# Patient Record
Sex: MALE | Race: WHITE | Employment: FULL TIME | URBAN - NONMETROPOLITAN AREA
[De-identification: names, ages, dates, MRNs, and addresses within clinical notes are randomized per-mention and may not be internally consistent; named-entity substitution may affect disease eponyms.]

---

## 2019-08-05 ENCOUNTER — OFFICE VISIT (OUTPATIENT)
Dept: FAMILY MEDICINE CLINIC | Age: 64
End: 2019-08-05

## 2019-08-05 VITALS
OXYGEN SATURATION: 99 % | SYSTOLIC BLOOD PRESSURE: 124 MMHG | HEART RATE: 68 BPM | TEMPERATURE: 97.7 F | DIASTOLIC BLOOD PRESSURE: 76 MMHG

## 2019-08-05 DIAGNOSIS — M54.32 LEFT SIDED SCIATICA: Primary | ICD-10-CM

## 2019-08-05 PROCEDURE — 20552 NJX 1/MLT TRIGGER POINT 1/2: CPT | Performed by: INTERNAL MEDICINE

## 2019-08-05 PROCEDURE — 99213 OFFICE O/P EST LOW 20 MIN: CPT | Performed by: INTERNAL MEDICINE

## 2019-08-05 RX ORDER — METHYLPREDNISOLONE 4 MG/1
TABLET ORAL
Qty: 1 KIT | Refills: 0 | Status: SHIPPED | OUTPATIENT
Start: 2019-08-05 | End: 2019-08-11

## 2019-08-05 RX ORDER — BUPIVACAINE HYDROCHLORIDE 5 MG/ML
0.5 INJECTION, SOLUTION PERINEURAL ONCE
Status: COMPLETED | OUTPATIENT
Start: 2019-08-05 | End: 2019-08-05

## 2019-08-05 RX ORDER — METHYLPREDNISOLONE ACETATE 40 MG/ML
20 INJECTION, SUSPENSION INTRA-ARTICULAR; INTRALESIONAL; INTRAMUSCULAR; SOFT TISSUE ONCE
Status: COMPLETED | OUTPATIENT
Start: 2019-08-05 | End: 2019-08-05

## 2019-08-05 RX ADMIN — BUPIVACAINE HYDROCHLORIDE 2.5 MG: 5 INJECTION, SOLUTION PERINEURAL at 14:12

## 2019-08-05 RX ADMIN — METHYLPREDNISOLONE ACETATE 20 MG: 40 INJECTION, SUSPENSION INTRA-ARTICULAR; INTRALESIONAL; INTRAMUSCULAR; SOFT TISSUE at 14:15

## 2019-08-05 ASSESSMENT — PATIENT HEALTH QUESTIONNAIRE - PHQ9
SUM OF ALL RESPONSES TO PHQ9 QUESTIONS 1 & 2: 0
1. LITTLE INTEREST OR PLEASURE IN DOING THINGS: 0
SUM OF ALL RESPONSES TO PHQ QUESTIONS 1-9: 0
2. FEELING DOWN, DEPRESSED OR HOPELESS: 0
SUM OF ALL RESPONSES TO PHQ QUESTIONS 1-9: 0

## 2019-08-07 ENCOUNTER — TELEPHONE (OUTPATIENT)
Dept: FAMILY MEDICINE CLINIC | Age: 64
End: 2019-08-07

## 2023-01-20 ENCOUNTER — APPOINTMENT (OUTPATIENT)
Dept: MRI IMAGING | Age: 68
DRG: 270 | End: 2023-01-20
Attending: INTERNAL MEDICINE
Payer: MEDICARE

## 2023-01-20 ENCOUNTER — HOSPITAL ENCOUNTER (INPATIENT)
Age: 68
LOS: 6 days | Discharge: NURSING, REHAB OR LONG-TERM CARE | DRG: 270 | End: 2023-01-26
Attending: INTERNAL MEDICINE | Admitting: INTERNAL MEDICINE
Payer: MEDICARE

## 2023-01-20 ENCOUNTER — ANESTHESIA (OUTPATIENT)
Dept: INTERVENTIONAL RADIOLOGY/VASCULAR | Age: 68
End: 2023-01-20
Payer: MEDICARE

## 2023-01-20 ENCOUNTER — APPOINTMENT (OUTPATIENT)
Dept: INTERVENTIONAL RADIOLOGY/VASCULAR | Age: 68
DRG: 270 | End: 2023-01-20
Attending: INTERNAL MEDICINE
Payer: MEDICARE

## 2023-01-20 ENCOUNTER — ANESTHESIA EVENT (OUTPATIENT)
Dept: INTERVENTIONAL RADIOLOGY/VASCULAR | Age: 68
End: 2023-01-20
Payer: MEDICARE

## 2023-01-20 ENCOUNTER — APPOINTMENT (OUTPATIENT)
Dept: ULTRASOUND IMAGING | Age: 68
DRG: 270 | End: 2023-01-20
Attending: INTERNAL MEDICINE
Payer: MEDICARE

## 2023-01-20 ENCOUNTER — APPOINTMENT (OUTPATIENT)
Dept: CT IMAGING | Age: 68
DRG: 270 | End: 2023-01-20
Attending: INTERNAL MEDICINE
Payer: MEDICARE

## 2023-01-20 PROBLEM — I77.74 VERTEBRAL ARTERY DISSECTION (HCC): Status: ACTIVE | Noted: 2023-01-20

## 2023-01-20 PROBLEM — I63.9 ACUTE ISCHEMIC STROKE (HCC): Status: ACTIVE | Noted: 2023-01-20

## 2023-01-20 PROBLEM — J44.9 CHRONIC OBSTRUCTIVE PULMONARY DISEASE (HCC): Status: ACTIVE | Noted: 2023-01-20

## 2023-01-20 LAB
ADENOVIRUS BY PCR: NOT DETECTED
ALBUMIN SERPL-MCNC: 4 G/DL (ref 3.5–5.2)
ALBUMIN SERPL-MCNC: 4 G/DL (ref 3.5–5.2)
ALP BLD-CCNC: 104 U/L (ref 40–129)
ALP BLD-CCNC: 109 U/L (ref 40–129)
ALT SERPL-CCNC: 12 U/L (ref 0–40)
ALT SERPL-CCNC: 12 U/L (ref 0–40)
ANION GAP SERPL CALCULATED.3IONS-SCNC: 10 MMOL/L (ref 7–16)
ANION GAP SERPL CALCULATED.3IONS-SCNC: 12 MMOL/L (ref 7–16)
APTT: 29.6 SEC (ref 24.5–35.1)
APTT: 37.2 SEC (ref 24.5–35.1)
AST SERPL-CCNC: 16 U/L (ref 0–39)
AST SERPL-CCNC: 18 U/L (ref 0–39)
BASOPHILS ABSOLUTE: 0.02 E9/L (ref 0–0.2)
BASOPHILS RELATIVE PERCENT: 0.4 % (ref 0–2)
BILIRUB SERPL-MCNC: 0.4 MG/DL (ref 0–1.2)
BILIRUB SERPL-MCNC: 0.5 MG/DL (ref 0–1.2)
BORDETELLA PARAPERTUSSIS BY PCR: NOT DETECTED
BORDETELLA PERTUSSIS BY PCR: NOT DETECTED
BUN BLDV-MCNC: 11 MG/DL (ref 6–23)
BUN BLDV-MCNC: 12 MG/DL (ref 6–23)
CALCIUM IONIZED: 1.26 MMOL/L (ref 1.15–1.33)
CALCIUM IONIZED: 1.27 MMOL/L (ref 1.15–1.33)
CALCIUM SERPL-MCNC: 8.6 MG/DL (ref 8.6–10.2)
CALCIUM SERPL-MCNC: 8.7 MG/DL (ref 8.6–10.2)
CHLAMYDOPHILIA PNEUMONIAE BY PCR: NOT DETECTED
CHLORIDE BLD-SCNC: 102 MMOL/L (ref 98–107)
CHLORIDE BLD-SCNC: 102 MMOL/L (ref 98–107)
CHOLESTEROL, TOTAL: 125 MG/DL (ref 0–199)
CO2: 20 MMOL/L (ref 22–29)
CO2: 22 MMOL/L (ref 22–29)
CORONAVIRUS 229E BY PCR: NOT DETECTED
CORONAVIRUS HKU1 BY PCR: NOT DETECTED
CORONAVIRUS NL63 BY PCR: NOT DETECTED
CORONAVIRUS OC43 BY PCR: NOT DETECTED
CREAT SERPL-MCNC: 0.6 MG/DL (ref 0.7–1.2)
CREAT SERPL-MCNC: 0.6 MG/DL (ref 0.7–1.2)
EKG ATRIAL RATE: 57 BPM
EKG P AXIS: 43 DEGREES
EKG P-R INTERVAL: 166 MS
EKG Q-T INTERVAL: 456 MS
EKG QRS DURATION: 86 MS
EKG QTC CALCULATION (BAZETT): 443 MS
EKG R AXIS: -25 DEGREES
EKG T AXIS: 73 DEGREES
EKG VENTRICULAR RATE: 57 BPM
EOSINOPHILS ABSOLUTE: 0.05 E9/L (ref 0.05–0.5)
EOSINOPHILS RELATIVE PERCENT: 1 % (ref 0–6)
GFR SERPL CREATININE-BSD FRML MDRD: >60 ML/MIN/1.73
GFR SERPL CREATININE-BSD FRML MDRD: >60 ML/MIN/1.73
GLUCOSE BLD-MCNC: 118 MG/DL (ref 74–99)
GLUCOSE BLD-MCNC: 119 MG/DL (ref 74–99)
HBA1C MFR BLD: 5.1 % (ref 4–5.6)
HCT VFR BLD CALC: 35.3 % (ref 37–54)
HCT VFR BLD CALC: 36.4 % (ref 37–54)
HDLC SERPL-MCNC: 37 MG/DL
HEMOGLOBIN: 12.2 G/DL (ref 12.5–16.5)
HEMOGLOBIN: 12.5 G/DL (ref 12.5–16.5)
HUMAN METAPNEUMOVIRUS BY PCR: NOT DETECTED
HUMAN RHINOVIRUS/ENTEROVIRUS BY PCR: NOT DETECTED
IMMATURE GRANULOCYTES #: 0.01 E9/L
IMMATURE GRANULOCYTES %: 0.2 % (ref 0–5)
INFLUENZA A BY PCR: NOT DETECTED
INFLUENZA B BY PCR: NOT DETECTED
LDL CHOLESTEROL CALCULATED: 74 MG/DL (ref 0–99)
LYMPHOCYTES ABSOLUTE: 1.57 E9/L (ref 1.5–4)
LYMPHOCYTES RELATIVE PERCENT: 30.4 % (ref 20–42)
MAGNESIUM: 1.8 MG/DL (ref 1.6–2.6)
MAGNESIUM: 2.2 MG/DL (ref 1.6–2.6)
MCH RBC QN AUTO: 30.4 PG (ref 26–35)
MCH RBC QN AUTO: 31.3 PG (ref 26–35)
MCHC RBC AUTO-ENTMCNC: 34.3 % (ref 32–34.5)
MCHC RBC AUTO-ENTMCNC: 34.6 % (ref 32–34.5)
MCV RBC AUTO: 88 FL (ref 80–99.9)
MCV RBC AUTO: 91.2 FL (ref 80–99.9)
MONOCYTES ABSOLUTE: 0.38 E9/L (ref 0.1–0.95)
MONOCYTES RELATIVE PERCENT: 7.4 % (ref 2–12)
MYCOPLASMA PNEUMONIAE BY PCR: NOT DETECTED
NEUTROPHILS ABSOLUTE: 3.13 E9/L (ref 1.8–7.3)
NEUTROPHILS RELATIVE PERCENT: 60.6 % (ref 43–80)
PARAINFLUENZA VIRUS 1 BY PCR: NOT DETECTED
PARAINFLUENZA VIRUS 2 BY PCR: NOT DETECTED
PARAINFLUENZA VIRUS 3 BY PCR: NOT DETECTED
PARAINFLUENZA VIRUS 4 BY PCR: NOT DETECTED
PDW BLD-RTO: 13.2 FL (ref 11.5–15)
PDW BLD-RTO: 13.3 FL (ref 11.5–15)
PHOSPHORUS: 2.4 MG/DL (ref 2.5–4.5)
PHOSPHORUS: 2.8 MG/DL (ref 2.5–4.5)
PLATELET # BLD: 264 E9/L (ref 130–450)
PLATELET # BLD: 267 E9/L (ref 130–450)
PMV BLD AUTO: 8.8 FL (ref 7–12)
PMV BLD AUTO: 9 FL (ref 7–12)
POTASSIUM REFLEX MAGNESIUM: 3.8 MMOL/L (ref 3.5–5)
POTASSIUM SERPL-SCNC: 3.8 MMOL/L (ref 3.5–5)
PRO-BNP: 650 PG/ML (ref 0–125)
RBC # BLD: 3.99 E12/L (ref 3.8–5.8)
RBC # BLD: 4.01 E12/L (ref 3.8–5.8)
RESPIRATORY SYNCYTIAL VIRUS BY PCR: NOT DETECTED
SARS-COV-2, PCR: NOT DETECTED
SODIUM BLD-SCNC: 134 MMOL/L (ref 132–146)
SODIUM BLD-SCNC: 134 MMOL/L (ref 132–146)
TOTAL PROTEIN: 6.5 G/DL (ref 6.4–8.3)
TOTAL PROTEIN: 6.9 G/DL (ref 6.4–8.3)
TRIGL SERPL-MCNC: 69 MG/DL (ref 0–149)
TROPONIN, HIGH SENSITIVITY: 13 NG/L (ref 0–11)
VLDLC SERPL CALC-MCNC: 14 MG/DL
WBC # BLD: 5.2 E9/L (ref 4.5–11.5)
WBC # BLD: 5.7 E9/L (ref 4.5–11.5)

## 2023-01-20 PROCEDURE — 70551 MRI BRAIN STEM W/O DYE: CPT

## 2023-01-20 PROCEDURE — 82330 ASSAY OF CALCIUM: CPT

## 2023-01-20 PROCEDURE — C9113 INJ PANTOPRAZOLE SODIUM, VIA: HCPCS

## 2023-01-20 PROCEDURE — 2500000003 HC RX 250 WO HCPCS

## 2023-01-20 PROCEDURE — 3700000000 HC ANESTHESIA ATTENDED CARE

## 2023-01-20 PROCEDURE — 2500000003 HC RX 250 WO HCPCS: Performed by: PSYCHIATRY & NEUROLOGY

## 2023-01-20 PROCEDURE — 99223 1ST HOSP IP/OBS HIGH 75: CPT | Performed by: PSYCHIATRY & NEUROLOGY

## 2023-01-20 PROCEDURE — 7100000000 HC PACU RECOVERY - FIRST 15 MIN

## 2023-01-20 PROCEDURE — 92523 SPEECH SOUND LANG COMPREHEN: CPT | Performed by: SPEECH-LANGUAGE PATHOLOGIST

## 2023-01-20 PROCEDURE — 94640 AIRWAY INHALATION TREATMENT: CPT

## 2023-01-20 PROCEDURE — 6360000002 HC RX W HCPCS

## 2023-01-20 PROCEDURE — 84484 ASSAY OF TROPONIN QUANT: CPT

## 2023-01-20 PROCEDURE — 36415 COLL VENOUS BLD VENIPUNCTURE: CPT

## 2023-01-20 PROCEDURE — 93010 ELECTROCARDIOGRAM REPORT: CPT | Performed by: INTERNAL MEDICINE

## 2023-01-20 PROCEDURE — 0202U NFCT DS 22 TRGT SARS-COV-2: CPT

## 2023-01-20 PROCEDURE — 83880 ASSAY OF NATRIURETIC PEPTIDE: CPT

## 2023-01-20 PROCEDURE — 83036 HEMOGLOBIN GLYCOSYLATED A1C: CPT

## 2023-01-20 PROCEDURE — 6360000004 HC RX CONTRAST MEDICATION: Performed by: PSYCHIATRY & NEUROLOGY

## 2023-01-20 PROCEDURE — 80061 LIPID PANEL: CPT

## 2023-01-20 PROCEDURE — 85730 THROMBOPLASTIN TIME PARTIAL: CPT

## 2023-01-20 PROCEDURE — 61635 INTRACRAN ANGIOPLSTY W/STENT: CPT

## 2023-01-20 PROCEDURE — 99292 CRITICAL CARE ADDL 30 MIN: CPT | Performed by: INTERNAL MEDICINE

## 2023-01-20 PROCEDURE — 3700000001 HC ADD 15 MINUTES (ANESTHESIA)

## 2023-01-20 PROCEDURE — 85025 COMPLETE CBC W/AUTO DIFF WBC: CPT

## 2023-01-20 PROCEDURE — X2CY3T7 EXTIRPATION OF MATTER FROM GREAT VESSEL USING COMPUTER-AIDED MECHANICAL ASPIRATION, PERCUTANEOUS APPROACH, NEW TECHNOLOGY GROUP 7: ICD-10-PCS | Performed by: PSYCHIATRY & NEUROLOGY

## 2023-01-20 PROCEDURE — 2000000000 HC ICU R&B

## 2023-01-20 PROCEDURE — 2500000003 HC RX 250 WO HCPCS: Performed by: INTERNAL MEDICINE

## 2023-01-20 PROCEDURE — 2500000003 HC RX 250 WO HCPCS: Performed by: NURSE ANESTHETIST, CERTIFIED REGISTERED

## 2023-01-20 PROCEDURE — 6370000000 HC RX 637 (ALT 250 FOR IP)

## 2023-01-20 PROCEDURE — 36224 PLACE CATH CAROTD ART: CPT

## 2023-01-20 PROCEDURE — 99291 CRITICAL CARE FIRST HOUR: CPT | Performed by: INTERNAL MEDICINE

## 2023-01-20 PROCEDURE — 87081 CULTURE SCREEN ONLY: CPT

## 2023-01-20 PROCEDURE — 6370000000 HC RX 637 (ALT 250 FOR IP): Performed by: PSYCHIATRY & NEUROLOGY

## 2023-01-20 PROCEDURE — 03HG3DZ INSERTION OF INTRALUMINAL DEVICE INTO INTRACRANIAL ARTERY, PERCUTANEOUS APPROACH: ICD-10-PCS | Performed by: PSYCHIATRY & NEUROLOGY

## 2023-01-20 PROCEDURE — 92610 EVALUATE SWALLOWING FUNCTION: CPT | Performed by: SPEECH-LANGUAGE PATHOLOGIST

## 2023-01-20 PROCEDURE — 85027 COMPLETE CBC AUTOMATED: CPT

## 2023-01-20 PROCEDURE — 37236 OPEN/PERQ PLACE STENT 1ST: CPT

## 2023-01-20 PROCEDURE — 2580000003 HC RX 258

## 2023-01-20 PROCEDURE — 61635 INTRACRAN ANGIOPLSTY W/STENT: CPT | Performed by: PSYCHIATRY & NEUROLOGY

## 2023-01-20 PROCEDURE — 36620 INSERTION CATHETER ARTERY: CPT

## 2023-01-20 PROCEDURE — 70450 CT HEAD/BRAIN W/O DYE: CPT

## 2023-01-20 PROCEDURE — 2580000003 HC RX 258: Performed by: PSYCHIATRY & NEUROLOGY

## 2023-01-20 PROCEDURE — 2580000003 HC RX 258: Performed by: NURSE ANESTHETIST, CERTIFIED REGISTERED

## 2023-01-20 PROCEDURE — 84100 ASSAY OF PHOSPHORUS: CPT

## 2023-01-20 PROCEDURE — 85347 COAGULATION TIME ACTIVATED: CPT

## 2023-01-20 PROCEDURE — 93005 ELECTROCARDIOGRAM TRACING: CPT

## 2023-01-20 PROCEDURE — 37184 PRIM ART M-THRMBC 1ST VSL: CPT

## 2023-01-20 PROCEDURE — 80053 COMPREHEN METABOLIC PANEL: CPT

## 2023-01-20 PROCEDURE — 92507 TX SP LANG VOICE COMM INDIV: CPT | Performed by: SPEECH-LANGUAGE PATHOLOGIST

## 2023-01-20 PROCEDURE — C1894 INTRO/SHEATH, NON-LASER: HCPCS

## 2023-01-20 PROCEDURE — 70450 CT HEAD/BRAIN W/O DYE: CPT | Performed by: RADIOLOGY

## 2023-01-20 PROCEDURE — 03QQ3ZZ REPAIR LEFT VERTEBRAL ARTERY, PERCUTANEOUS APPROACH: ICD-10-PCS | Performed by: PSYCHIATRY & NEUROLOGY

## 2023-01-20 PROCEDURE — 83735 ASSAY OF MAGNESIUM: CPT

## 2023-01-20 PROCEDURE — 92526 ORAL FUNCTION THERAPY: CPT | Performed by: SPEECH-LANGUAGE PATHOLOGIST

## 2023-01-20 PROCEDURE — 76377 3D RENDER W/INTRP POSTPROCES: CPT

## 2023-01-20 PROCEDURE — 7100000001 HC PACU RECOVERY - ADDTL 15 MIN

## 2023-01-20 PROCEDURE — 6360000002 HC RX W HCPCS: Performed by: NURSE ANESTHETIST, CERTIFIED REGISTERED

## 2023-01-20 RX ORDER — SODIUM CHLORIDE 0.9 % (FLUSH) 0.9 %
5-40 SYRINGE (ML) INJECTION PRN
Status: DISCONTINUED | OUTPATIENT
Start: 2023-01-20 | End: 2023-01-24

## 2023-01-20 RX ORDER — LIDOCAINE HYDROCHLORIDE 20 MG/ML
INJECTION, SOLUTION EPIDURAL; INFILTRATION; INTRACAUDAL; PERINEURAL PRN
Status: DISCONTINUED | OUTPATIENT
Start: 2023-01-20 | End: 2023-01-20 | Stop reason: SDUPTHER

## 2023-01-20 RX ORDER — HYDRALAZINE HYDROCHLORIDE 20 MG/ML
10 INJECTION INTRAMUSCULAR; INTRAVENOUS EVERY 6 HOURS PRN
Status: DISCONTINUED | OUTPATIENT
Start: 2023-01-20 | End: 2023-01-26 | Stop reason: HOSPADM

## 2023-01-20 RX ORDER — ATORVASTATIN CALCIUM 20 MG/1
20 TABLET, FILM COATED ORAL NIGHTLY
Status: DISCONTINUED | OUTPATIENT
Start: 2023-01-20 | End: 2023-01-20

## 2023-01-20 RX ORDER — ASPIRIN 300 MG/1
300 SUPPOSITORY RECTAL DAILY
Status: DISCONTINUED | OUTPATIENT
Start: 2023-01-20 | End: 2023-01-22

## 2023-01-20 RX ORDER — ACETAMINOPHEN 325 MG/1
650 TABLET ORAL EVERY 4 HOURS PRN
Status: DISCONTINUED | OUTPATIENT
Start: 2023-01-20 | End: 2023-01-26 | Stop reason: HOSPADM

## 2023-01-20 RX ORDER — ATROPINE SULFATE 0.1 MG/ML
INJECTION INTRAVENOUS
Status: DISPENSED
Start: 2023-01-20 | End: 2023-01-20

## 2023-01-20 RX ORDER — POLYETHYLENE GLYCOL 3350 17 G/17G
17 POWDER, FOR SOLUTION ORAL DAILY PRN
Status: DISCONTINUED | OUTPATIENT
Start: 2023-01-20 | End: 2023-01-26 | Stop reason: HOSPADM

## 2023-01-20 RX ORDER — ONDANSETRON 2 MG/ML
4 INJECTION INTRAMUSCULAR; INTRAVENOUS EVERY 6 HOURS PRN
Status: DISCONTINUED | OUTPATIENT
Start: 2023-01-20 | End: 2023-01-26 | Stop reason: HOSPADM

## 2023-01-20 RX ORDER — PROPOFOL 10 MG/ML
INJECTION, EMULSION INTRAVENOUS PRN
Status: DISCONTINUED | OUTPATIENT
Start: 2023-01-20 | End: 2023-01-20 | Stop reason: SDUPTHER

## 2023-01-20 RX ORDER — LORAZEPAM 1 MG/1
3 TABLET ORAL
Status: DISCONTINUED | OUTPATIENT
Start: 2023-01-20 | End: 2023-01-23

## 2023-01-20 RX ORDER — SODIUM CHLORIDE 9 MG/ML
INJECTION, SOLUTION INTRAVENOUS PRN
Status: DISCONTINUED | OUTPATIENT
Start: 2023-01-20 | End: 2023-01-20 | Stop reason: SDUPTHER

## 2023-01-20 RX ORDER — HEPARIN SODIUM 10000 [USP'U]/100ML
5-30 INJECTION, SOLUTION INTRAVENOUS CONTINUOUS
Status: DISCONTINUED | OUTPATIENT
Start: 2023-01-20 | End: 2023-01-20

## 2023-01-20 RX ORDER — BUDESONIDE 0.5 MG/2ML
0.5 INHALANT ORAL 2 TIMES DAILY
Status: DISCONTINUED | OUTPATIENT
Start: 2023-01-20 | End: 2023-01-26 | Stop reason: HOSPADM

## 2023-01-20 RX ORDER — ROCURONIUM BROMIDE 10 MG/ML
INJECTION, SOLUTION INTRAVENOUS PRN
Status: DISCONTINUED | OUTPATIENT
Start: 2023-01-20 | End: 2023-01-20 | Stop reason: SDUPTHER

## 2023-01-20 RX ORDER — FENTANYL CITRATE 50 UG/ML
INJECTION, SOLUTION INTRAMUSCULAR; INTRAVENOUS PRN
Status: DISCONTINUED | OUTPATIENT
Start: 2023-01-20 | End: 2023-01-20 | Stop reason: SDUPTHER

## 2023-01-20 RX ORDER — LABETALOL HYDROCHLORIDE 5 MG/ML
10 INJECTION, SOLUTION INTRAVENOUS EVERY 10 MIN PRN
Status: DISCONTINUED | OUTPATIENT
Start: 2023-01-20 | End: 2023-01-20

## 2023-01-20 RX ORDER — EPTIFIBATIDE 0.75 MG/ML
INJECTION, SOLUTION INTRAVENOUS CONTINUOUS PRN
Status: DISCONTINUED | OUTPATIENT
Start: 2023-01-20 | End: 2023-01-20 | Stop reason: SDUPTHER

## 2023-01-20 RX ORDER — LORAZEPAM 1 MG/1
4 TABLET ORAL
Status: DISCONTINUED | OUTPATIENT
Start: 2023-01-20 | End: 2023-01-23

## 2023-01-20 RX ORDER — SODIUM CHLORIDE 9 MG/ML
INJECTION, SOLUTION INTRAVENOUS CONTINUOUS PRN
Status: DISCONTINUED | OUTPATIENT
Start: 2023-01-20 | End: 2023-01-20 | Stop reason: SDUPTHER

## 2023-01-20 RX ORDER — LORAZEPAM 2 MG/ML
2 INJECTION INTRAMUSCULAR
Status: DISCONTINUED | OUTPATIENT
Start: 2023-01-20 | End: 2023-01-23

## 2023-01-20 RX ORDER — MULTIVITAMIN WITH IRON
1 TABLET ORAL DAILY
Status: DISCONTINUED | OUTPATIENT
Start: 2023-01-20 | End: 2023-01-26 | Stop reason: HOSPADM

## 2023-01-20 RX ORDER — SODIUM CHLORIDE 0.9 % (FLUSH) 0.9 %
5-40 SYRINGE (ML) INJECTION EVERY 12 HOURS SCHEDULED
Status: DISCONTINUED | OUTPATIENT
Start: 2023-01-20 | End: 2023-01-26 | Stop reason: HOSPADM

## 2023-01-20 RX ORDER — SUCCINYLCHOLINE CHLORIDE 20 MG/ML
INJECTION INTRAMUSCULAR; INTRAVENOUS PRN
Status: DISCONTINUED | OUTPATIENT
Start: 2023-01-20 | End: 2023-01-20 | Stop reason: SDUPTHER

## 2023-01-20 RX ORDER — MAGNESIUM SULFATE 1 G/100ML
1000 INJECTION INTRAVENOUS ONCE
Status: COMPLETED | OUTPATIENT
Start: 2023-01-20 | End: 2023-01-20

## 2023-01-20 RX ORDER — THIAMINE HYDROCHLORIDE 100 MG/ML
100 INJECTION, SOLUTION INTRAMUSCULAR; INTRAVENOUS DAILY
Status: DISCONTINUED | OUTPATIENT
Start: 2023-01-20 | End: 2023-01-23

## 2023-01-20 RX ORDER — SODIUM CHLORIDE 9 MG/ML
INJECTION, SOLUTION INTRAVENOUS PRN
Status: DISCONTINUED | OUTPATIENT
Start: 2023-01-20 | End: 2023-01-26 | Stop reason: HOSPADM

## 2023-01-20 RX ORDER — ATORVASTATIN CALCIUM 80 MG/1
80 TABLET, FILM COATED ORAL NIGHTLY
Status: DISCONTINUED | OUTPATIENT
Start: 2023-01-21 | End: 2023-01-26 | Stop reason: HOSPADM

## 2023-01-20 RX ORDER — ARFORMOTEROL TARTRATE 15 UG/2ML
15 SOLUTION RESPIRATORY (INHALATION) 2 TIMES DAILY
Status: DISCONTINUED | OUTPATIENT
Start: 2023-01-20 | End: 2023-01-26 | Stop reason: HOSPADM

## 2023-01-20 RX ORDER — SODIUM CHLORIDE 9 MG/ML
INJECTION, SOLUTION INTRAVENOUS CONTINUOUS
Status: DISCONTINUED | OUTPATIENT
Start: 2023-01-20 | End: 2023-01-20

## 2023-01-20 RX ORDER — HEPARIN SODIUM 1000 [USP'U]/ML
INJECTION, SOLUTION INTRAVENOUS; SUBCUTANEOUS PRN
Status: DISCONTINUED | OUTPATIENT
Start: 2023-01-20 | End: 2023-01-20 | Stop reason: SDUPTHER

## 2023-01-20 RX ORDER — SODIUM CHLORIDE 0.9 % (FLUSH) 0.9 %
5-40 SYRINGE (ML) INJECTION EVERY 12 HOURS SCHEDULED
Status: DISCONTINUED | OUTPATIENT
Start: 2023-01-20 | End: 2023-01-24

## 2023-01-20 RX ORDER — LORAZEPAM 2 MG/ML
3 INJECTION INTRAMUSCULAR
Status: DISCONTINUED | OUTPATIENT
Start: 2023-01-20 | End: 2023-01-23

## 2023-01-20 RX ORDER — DEXAMETHASONE SODIUM PHOSPHATE 10 MG/ML
INJECTION, EMULSION INTRAMUSCULAR; INTRAVENOUS PRN
Status: DISCONTINUED | OUTPATIENT
Start: 2023-01-20 | End: 2023-01-20 | Stop reason: SDUPTHER

## 2023-01-20 RX ORDER — ONDANSETRON 4 MG/1
4 TABLET, ORALLY DISINTEGRATING ORAL EVERY 8 HOURS PRN
Status: DISCONTINUED | OUTPATIENT
Start: 2023-01-20 | End: 2023-01-26 | Stop reason: HOSPADM

## 2023-01-20 RX ORDER — FOLIC ACID 5 MG/ML
1 INJECTION, SOLUTION INTRAMUSCULAR; INTRAVENOUS; SUBCUTANEOUS DAILY
Status: DISCONTINUED | OUTPATIENT
Start: 2023-01-20 | End: 2023-01-23

## 2023-01-20 RX ORDER — LORAZEPAM 1 MG/1
1 TABLET ORAL
Status: DISCONTINUED | OUTPATIENT
Start: 2023-01-20 | End: 2023-01-23

## 2023-01-20 RX ORDER — EPTIFIBATIDE 0.75 MG/ML
0.5 INJECTION, SOLUTION INTRAVENOUS CONTINUOUS
Status: DISPENSED | OUTPATIENT
Start: 2023-01-20 | End: 2023-01-21

## 2023-01-20 RX ORDER — NICOTINE 21 MG/24HR
1 PATCH, TRANSDERMAL 24 HOURS TRANSDERMAL DAILY
Status: DISCONTINUED | OUTPATIENT
Start: 2023-01-20 | End: 2023-01-20

## 2023-01-20 RX ORDER — LORAZEPAM 2 MG/ML
1 INJECTION INTRAMUSCULAR
Status: DISCONTINUED | OUTPATIENT
Start: 2023-01-20 | End: 2023-01-23

## 2023-01-20 RX ORDER — LORAZEPAM 2 MG/ML
4 INJECTION INTRAMUSCULAR
Status: DISCONTINUED | OUTPATIENT
Start: 2023-01-20 | End: 2023-01-23

## 2023-01-20 RX ORDER — LORAZEPAM 1 MG/1
2 TABLET ORAL
Status: DISCONTINUED | OUTPATIENT
Start: 2023-01-20 | End: 2023-01-23

## 2023-01-20 RX ORDER — SODIUM CHLORIDE 0.9 % (FLUSH) 0.9 %
5-40 SYRINGE (ML) INJECTION PRN
Status: DISCONTINUED | OUTPATIENT
Start: 2023-01-20 | End: 2023-01-26 | Stop reason: HOSPADM

## 2023-01-20 RX ORDER — ONDANSETRON 2 MG/ML
INJECTION INTRAMUSCULAR; INTRAVENOUS PRN
Status: DISCONTINUED | OUTPATIENT
Start: 2023-01-20 | End: 2023-01-20 | Stop reason: SDUPTHER

## 2023-01-20 RX ADMIN — THIAMINE HYDROCHLORIDE 100 MG: 100 INJECTION, SOLUTION INTRAMUSCULAR; INTRAVENOUS at 08:23

## 2023-01-20 RX ADMIN — FENTANYL CITRATE 100 MCG: 50 INJECTION, SOLUTION INTRAMUSCULAR; INTRAVENOUS at 17:16

## 2023-01-20 RX ADMIN — PHENYLEPHRINE HYDROCHLORIDE 100 MCG: 10 INJECTION INTRAVENOUS at 18:19

## 2023-01-20 RX ADMIN — PHENYLEPHRINE HYDROCHLORIDE 100 MCG: 10 INJECTION INTRAVENOUS at 17:54

## 2023-01-20 RX ADMIN — TICAGRELOR 180 MG: 90 TABLET ORAL at 13:18

## 2023-01-20 RX ADMIN — SODIUM CHLORIDE, PRESERVATIVE FREE 10 ML: 5 INJECTION INTRAVENOUS at 13:19

## 2023-01-20 RX ADMIN — SUCCINYLCHOLINE CHLORIDE 160 MG: 20 INJECTION, SOLUTION INTRAMUSCULAR; INTRAVENOUS at 17:16

## 2023-01-20 RX ADMIN — SODIUM CHLORIDE: 9 INJECTION, SOLUTION INTRAVENOUS at 05:02

## 2023-01-20 RX ADMIN — DEXAMETHASONE SODIUM PHOSPHATE 10 MG: 10 INJECTION, EMULSION INTRAMUSCULAR; INTRAVENOUS at 17:23

## 2023-01-20 RX ADMIN — LORAZEPAM 1 MG: 2 INJECTION INTRAMUSCULAR; INTRAVENOUS at 23:04

## 2023-01-20 RX ADMIN — SODIUM CHLORIDE, PRESERVATIVE FREE 40 MG: 5 INJECTION INTRAVENOUS at 08:23

## 2023-01-20 RX ADMIN — SUGAMMADEX 143 MG: 100 INJECTION, SOLUTION INTRAVENOUS at 19:02

## 2023-01-20 RX ADMIN — ARFORMOTEROL TARTRATE 15 MCG: 15 SOLUTION RESPIRATORY (INHALATION) at 08:38

## 2023-01-20 RX ADMIN — ONDANSETRON HYDROCHLORIDE 4 MG: 2 SOLUTION INTRAMUSCULAR; INTRAVENOUS at 17:23

## 2023-01-20 RX ADMIN — POTASSIUM PHOSPHATE, MONOBASIC AND POTASSIUM PHOSPHATE, DIBASIC 10 MMOL: 224; 236 INJECTION, SOLUTION, CONCENTRATE INTRAVENOUS at 03:57

## 2023-01-20 RX ADMIN — PHENYLEPHRINE HYDROCHLORIDE 100 MCG: 10 INJECTION INTRAVENOUS at 18:01

## 2023-01-20 RX ADMIN — Medication 10 ML: at 08:24

## 2023-01-20 RX ADMIN — PHENYLEPHRINE HYDROCHLORIDE 100 MCG: 10 INJECTION INTRAVENOUS at 18:09

## 2023-01-20 RX ADMIN — ROCURONIUM BROMIDE 10 MG: 10 INJECTION, SOLUTION INTRAVENOUS at 18:00

## 2023-01-20 RX ADMIN — PHENYLEPHRINE HYDROCHLORIDE 100 MCG: 10 INJECTION INTRAVENOUS at 18:05

## 2023-01-20 RX ADMIN — ROCURONIUM BROMIDE 50 MG: 10 INJECTION, SOLUTION INTRAVENOUS at 17:24

## 2023-01-20 RX ADMIN — EPTIFIBATIDE 0.5 MCG/KG/MIN: 0.75 INJECTION INTRAVENOUS at 18:12

## 2023-01-20 RX ADMIN — Medication 1 TABLET: at 08:23

## 2023-01-20 RX ADMIN — SODIUM CHLORIDE 5 MG/HR: 9 INJECTION, SOLUTION INTRAVENOUS at 16:35

## 2023-01-20 RX ADMIN — HYDRALAZINE HYDROCHLORIDE 10 MG: 20 INJECTION INTRAMUSCULAR; INTRAVENOUS at 23:25

## 2023-01-20 RX ADMIN — MAGNESIUM SULFATE IN DEXTROSE 1000 MG: 10 INJECTION, SOLUTION INTRAVENOUS at 02:47

## 2023-01-20 RX ADMIN — PROPOFOL 160 MG: 10 INJECTION, EMULSION INTRAVENOUS at 17:16

## 2023-01-20 RX ADMIN — FOLIC ACID 1 MG: 5 INJECTION, SOLUTION INTRAMUSCULAR; INTRAVENOUS; SUBCUTANEOUS at 13:17

## 2023-01-20 RX ADMIN — ASPIRIN 300 MG: 300 SUPPOSITORY RECTAL at 20:25

## 2023-01-20 RX ADMIN — HYDRALAZINE HYDROCHLORIDE 10 MG: 20 INJECTION INTRAMUSCULAR; INTRAVENOUS at 15:01

## 2023-01-20 RX ADMIN — Medication 100 MG: at 17:16

## 2023-01-20 RX ADMIN — BUDESONIDE 500 MCG: 0.5 SUSPENSION RESPIRATORY (INHALATION) at 20:27

## 2023-01-20 RX ADMIN — HEPARIN SODIUM 1500 UNITS: 1000 INJECTION INTRAVENOUS; SUBCUTANEOUS at 17:55

## 2023-01-20 RX ADMIN — BUDESONIDE 500 MCG: 0.5 SUSPENSION RESPIRATORY (INHALATION) at 08:38

## 2023-01-20 RX ADMIN — IOPAMIDOL 125 ML: 612 INJECTION, SOLUTION INTRAVENOUS at 17:45

## 2023-01-20 RX ADMIN — SODIUM CHLORIDE: 9 INJECTION, SOLUTION INTRAVENOUS at 17:11

## 2023-01-20 RX ADMIN — ARFORMOTEROL TARTRATE 15 MCG: 15 SOLUTION RESPIRATORY (INHALATION) at 20:27

## 2023-01-20 RX ADMIN — HEPARIN SODIUM 1000 UNITS: 1000 INJECTION INTRAVENOUS; SUBCUTANEOUS at 18:04

## 2023-01-20 RX ADMIN — HEPARIN SODIUM 12 UNITS/KG/HR: 10000 INJECTION, SOLUTION INTRAVENOUS at 02:51

## 2023-01-20 ASSESSMENT — PAIN SCALES - GENERAL
PAINLEVEL_OUTOF10: 0

## 2023-01-20 NOTE — PROGRESS NOTES
CT head shows significant cerebellar infarct.   Patient has new onset diplopia which would indicated brainstem ischemia    Given the delay in care at [facility] , and stroke being >24 hours  I would need a STAT MRI brain to further determine if it is safe to offer any sort of neurointervention

## 2023-01-20 NOTE — PROGRESS NOTES
Reveiwed MRI   Images were pushed from [facility] to us  Discussed with     At this point having artery to artery embolization despite maximal medical therapy     Endovascular therapy would be the only other option he has as a life saving procedure      agrees and I will discuss with the patient

## 2023-01-20 NOTE — CARE COORDINATION
Care Coordination: LOS 0:  Transfer 1/20/23 from outside facility with garbled speech/facial droop- L. Cerebellar Subacute infarction with concern of thrombosed L Vertebral artery dissection. Neuro consulted, CIWA, Heparin gtt, Room air, Stat MRI. Neurovasc. consult for possible vertebral artery stenting. P/ Neuro, new symptoms since arrival suggesting likely brainstem stroke.  Will follow    Kit Nurse

## 2023-01-20 NOTE — PROCEDURES
Attempted echo a couple times and pt. Was either about to be transported to another test or was not in room. Will try to get echo done at a later time if possible.

## 2023-01-20 NOTE — CONSULTS
[address] and [address] Electrophysiology  Consultation Report  PATIENT: Amarilis Wells  MEDICAL RECORD NUMBER: [id number]  DATE OF SERVICE:  1/20/2023  ATTENDING ELECTROPHYSIOLOGIST: Mahad Poewll MD  PRIMARY ELECTROPHYSIOLOGIST: Justine Bauer  REFERRING PHYSICIAN: Eleonora Devlin DO and Dianna Silverio MD  CHIEF COMPLAINT: Facial droop, garbled speech      HPI: This is a 78 y.o. male with no previous known medical history since the patient has not been under medical care recently who presents with new onset complaints of unsteadiness of gait, garbled speech related to a left cerebellar stroke. His history is obtained from review of medical records since the patient is currently unable to give me any history. As noted the patient presented with the new neurological symptoms as a transfer from [facility].  According to admitting notes the patient felt well last on 1/17/2023. He awoke the next day with difficulty in ambulation as well as garbled speech and facial droop and presented to [facility] from where he was transferred to [facility] with a left cerebellar infarct related to a thrombosed left vertebral artery. Since transfer he has been seen and intervened upon by interventional neuroradiology   Results are as follows  1. 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 Partial from doses of the V1 and V2 segments suspicious for vessel   trauma. 2. Successful thrombectomy followed by reocclusion of the left   vertebral artery both extracranial and intracranial segments. 3. Complete stent assisted reconstruction of the extracranial left   vertebral artery with distal embolic protection. 4. Left posterior cerebral artery and right posterior cerebral artery   thrombectomy as a result from artery to artery emboli   5.  Successful rescue mechanical intracranial stenting of the V4   segment to prevent reocclusion       Cardiac electrophysiology service is consulted for sinus bradycardia noted on admission. Patient Active Problem List    Diagnosis Date Noted    Cerebellar stroke ([facility] 01/20/2023     Priority: Medium    Vertebral artery dissection ([facility] 01/20/2023     Priority: Medium    Chronic obstructive pulmonary disease ([facility] 01/20/2023     Priority: Medium       History reviewed. No pertinent past medical history. History reviewed. No pertinent family history.     Social History     Tobacco Use    Smoking status: Every Day     Packs/day: 0.75     Years: 40.00     Pack years: 30.00     Types: Cigarettes     Start date: 8/5/1979    Smokeless tobacco: Never   Substance Use Topics    Alcohol use: Not on file       Current Facility-Administered Medications   Medication Dose Route Frequency Provider Last Rate Last Admin    ondansetron (ZOFRAN-ODT) disintegrating tablet 4 mg  4 mg Oral Q8H PRN FLAVIA García - CNP        Or    ondansetron (ZOFRAN) injection 4 mg  4 mg IntraVENous Q6H PRN Yuri Haddad, APRN - CNP        polyethylene glycol (GLYCOLAX) packet 17 g  17 g Oral Daily PRN Yuri Haddad, APRN - CNP        perflutren lipid microspheres (DEFINITY) injection 1.5 mL  1.5 mL IntraVENous ONCE PRN FLAVIA García - CNP        [START ON 1/21/2023] atorvastatin (LIPITOR) tablet 80 mg  80 mg Oral Nightly Yuri Haddad APRN - CNP        hydrALAZINE (APRESOLINE) injection 10 mg  10 mg IntraVENous Q6H PRN Yuri Haddad APRN - CNP   10 mg at 01/20/23 1501    sodium chloride flush 0.9 % injection 5-40 mL  5-40 mL IntraVENous 2 times per day Yuri Haddad APRN - CNP   10 mL at 01/20/23 0824    sodium chloride flush 0.9 % injection 5-40 mL  5-40 mL IntraVENous PRN Yuri Haddad, APRN - CNP   10 mL at 01/20/23 1319    0.9 % sodium chloride infusion   IntraVENous PRN Yuri Haddad, APRN - CNP        thiamine (B-1) injection 100 mg  100 mg IntraVENous Daily FLAVIA García - CNP 100 mg at 01/20/23 8779    multivitamin 1 tablet  1 tablet Oral Daily Demetri Narayananverna APRN - CNP   1 tablet at 01/20/23 5096    LORazepam (ATIVAN) tablet 1 mg  1 mg Oral Q1H PRN Demetri Larch, APRN - CNP        Or    LORazepam (ATIVAN) injection 1 mg  1 mg IntraVENous Q1H PRN Demetri Larch, APRN - CNP        Or    LORazepam (ATIVAN) tablet 2 mg  2 mg Oral Q1H PRN Demetri Larch, APRN - CNP        Or    LORazepam (ATIVAN) injection 2 mg  2 mg IntraVENous Q1H PRN Demetri Larch, APRN - CNP        Or    LORazepam (ATIVAN) tablet 3 mg  3 mg Oral Q1H PRN Demetri Larch, APRN - CNP        Or    LORazepam (ATIVAN) injection 3 mg  3 mg IntraVENous Q1H PRN Demetri Larch, APRN - CNP        Or    LORazepam (ATIVAN) tablet 4 mg  4 mg Oral Q1H PRN Demetri Larch, APRN - CNP        Or    LORazepam (ATIVAN) injection 4 mg  4 mg IntraVENous Q1H PRN Demetri Ladych, APRN - CNP        arformoterol tartrate (BROVANA) nebulizer solution 15 mcg  15 mcg Nebulization BID Demetri Brandi, APRN - CNP   15 mcg at 01/20/23 0838    budesonide (PULMICORT) nebulizer suspension 500 mcg  0.5 mg Nebulization BID Demetri Brandi, APRN - CNP   500 mcg at 01/20/23 0838    pantoprazole (PROTONIX) 40 mg in sodium chloride (PF) 0.9 % 10 mL injection  40 mg IntraVENous Daily Demetri Paz APRN - CNP   40 mg at 03/95/55 5255    folic acid injection 1 mg  1 mg IntraVENous Daily Lindsey Goodson DO   1 mg at 01/20/23 1317    niCARdipine (CARDENE) 25 mg in sodium chloride 0.9 % 250 mL infusion ([id number])  2.5-15 mg/hr IntraVENous Continuous Bessy Ardon MD   Stopped at 01/20/23 1705     Facility-Administered Medications Ordered in Other Encounters   Medication Dose Route Frequency Provider Last Rate Last Admin    0.9 % sodium chloride infusion   IntraVENous Continuous PRN FLAVIA Moraes CRNA   New Bag at 01/20/23 1711    propofol injection   IntraVENous PRN FLAVIA Moraes - CRNA   160 mg at 01/20/23 1716    lidocaine PF 2 % injection   IntraVENous PRN Cherylene Leas, APRN - CRNA   100 mg at 01/20/23 1716    succinylcholine (ANECTINE) injection   IntraVENous PRN Cherylene Leas, APRN - CRNA   160 mg at 01/20/23 1716    rocuronium (ZEMURON) injection   IntraVENous PRN Cherylene Leas, APRN - CRNA   10 mg at 01/20/23 1800    fentaNYL (SUBLIMAZE) injection   IntraVENous PRN Cherylene Leas, APRN - CRNA   100 mcg at 01/20/23 1716    dexamethasone (PF) (DECADRON) injection   IntraVENous PRN Cherylene Leas, APRN - CRNA   10 mg at 01/20/23 1723    ondansetron (ZOFRAN) injection   IntraVENous PRN Cherylene Leas, APRN - CRNA   4 mg at 01/20/23 1723    heparin (porcine) injection   IntraVENous PRN Cherylene Leas, APRN - CRNA   1,000 Units at 01/20/23 1804    phenylephrine (SHIRLEY-SYNEPHRINE) injection   IntraVENous PRN Cherylene Leas, APRN - CRNA   100 mcg at 01/20/23 1819    eptifibatide (INTEGRILIN) 0.75 mg/mL infusion   IntraVENous Continuous PRN Cherylene Leas, APRN - CRNA 2.868 mL/hr at 01/20/23 1812 0.5 mcg/kg/min at 01/20/23 1812        No Known Allergies    ROS: Cannot be obtained since the patient is somewhat drowsy and unable to respond appropriately       PHYSICAL EXAM:   Vitals:    01/20/23 1330 01/20/23 1400 01/20/23 1500 01/20/23 1512   BP: (!) 208/91  (!) 184/87 (!) 160/96   Pulse: 63 57 53 64   Resp: 19 17 18 16   Temp:       TempSrc:       SpO2: 98% 98% 99%    Weight:       Height:          Constitutional: Well-developed, no acute distress  Eyes: conjunctivae normal, no xanthelasma   Ears, Nose, Throat: oral mucosa moist, no cyanosis   CV: no JVD or leg edema,. Regular rate and rhythm. Normal S1S2 and no S3. No murmurs, rubs, or gallops.   Lungs: clear to auscultation bilaterally, normal respiratory effort without used of accessory muscles  Abdomen: soft, non-tender, nondistended  Musculoskeletal: no digital clubbing, no edema   Skin: warm, no rashes     I have personally reviewed the laboratory, cardiac diagnostic and radiographic testing as outlined below:    Data:    Recent Labs     01/20/23  0106 01/20/23 0425   WBC 5.7 5.2   HGB 12.5 12.2*   HCT 36.4* 35.3*    267     Recent Labs     01/20/23  0106 01/20/23 0425    134   K 3.8 3.8    102   CO2 20* 22   BUN 12 11   CREATININE 0.6* 0.6*   CALCIUM 8.6 8.7      Lab Results   Component Value Date/Time    MG 2.2 01/20/2023 04:25 AM     No results for input(s): TSH in the last 72 hours. No results for input(s): INR in the last 72 hours. Telemetry: Sinus rhythm    EKG: Sinus rhythm/sinus bradycardia, leftward axis     Echocardiogram: 1/21/23    Conclusions      Summary   Technically difficult examination. Ejection fraction is visually estimated at 60 to 65%. Normal right ventricular size and function. Agitated saline injected for shunt evaluation shows no evidence of shunt. Mild mitral regurgitation is present. Signature      ----------------------------------------------------------------   Electronically signed by Karen Lopez MD(Interpreting   physician) on 01/21/2023 05:45 PM    I have independently reviewed all of the ECGs and rhythm strips per above     Assessment/Plan:     1. Sinus bradycardia--related to the neurological event  Unlikely to require any EP intervention    2. Cerebellar stroke--now post acute intervention, thrombectomy and stent placement of the affected vertebral artery    3. COPD    Recommendations:    Cardiorespiratory support as well as neurological monitoring as is ongoing in the intensive care unit  Doubt need for any intervention from electrophysiology standpoint  Will see as needed    All of the above was completed along with reviewing the above stated recommendations.   And a total of >50% of that time involved face-to-face time providing counseling and or coordination of care with the other providers, reviewing records/tests, counseling/education of the patient, ordering medications/tests/procedures, coordinating care, and documenting clinical information in the EHR. Thank you for allowing me to participate in your patient's care. Please call me if there are any questions or concerns.       Noah Lezama MD  Cardiac Electrophysiology  [facility]) Physicians  [facility] [place]: [facility] Electrophysiology  1/21/23

## 2023-01-20 NOTE — PROCEDURES
1500 Pre-Op: Pt's /87 consecutively x3 measurements , admin HTN PRN 10mg Hydralazine IV.  Will continue to monitor closely

## 2023-01-20 NOTE — CONSULTS
Critical Care Admit/Consult Note     Patient Crescencio Stone   MRN -  [id number]   [place] # - [de-identified]   - 1955      Date of Admission -  2023 12:29 AM  Date of evaluation -  2023  4410/4410-A   Hospital Day - 0    ADMIT/CONSULT DETAILS     Reason for Admit/Consult   Ischemic stroke, vertebral artery dissection     Consulting Liza Cleveland MD  Primary Care Physician - Nicholas Ly MD       HPI   Mr. Xochitl Rodriguez is a 78 y.o. male who was admitted to [facility] after transferred from an outside facility after presenting with difficulty walking, garbled speech and a facial droop. The patient had difficulty walking for one week and noticed the speech and facial droop and presented to the ED. While at the outside facility, the patient was found to have a left cerebellar hemisphere subacute area of infarction. He was given aspirin and clopidogrel and had an MRA/CTA with concerns of thrombosed left vertebral artery dissection. The patient was started a low dose heparin drip and the decision was made to transfer him to [facility], with Dr. Yumi Larry, neurology and Dr. Stephenie Palm, neuro-intervention consulted and made aware of the patient's transfer. Significant past medical history of tobacco dependence and alcohol dependence     Upon arrival to MICU, the patient is alert and oriented, with clear speech, left sided facial droop noted, left tongue deviation, pupils equals 4 mm, with reports of a headache across bilateral occipital lobes. The patient is currently on room air, no in any respiratory distress, denies any chest pain. The plan of care discussed with the patient and the patient is a full code. Past Medical History   History reviewed. No pertinent past medical history. Past Surgical History     History reviewed. No pertinent surgical history.     Influenza:  Not indicated  Pneumococcal Polysaccharide:  Not indicated    Current Medications   Current Medications    [START ON 2023] atorvastatin  80 mg Oral Nightly    atropine         ondansetron **OR** ondansetron, polyethylene glycol, perflutren lipid microspheres, hydrALAZINE  IV Drips/Infusions    Home Medications  No medications prior to admission. Diet/Nutrition   Diet NPO    Allergies   Patient has no known allergies. Social History   Tobacco   reports that he has been smoking cigarettes. He started smoking about 43 years ago. He has a 30.00 pack-year smoking history. He has never used smokeless tobacco.    Alcohol     has no history on file for alcohol use. Family History   History reviewed. No pertinent family history. Sleep History   n/a    ROS   REVIEW OF SYSTEMS:  CONSTITUTIONAL:  negative except for  fatigue  HEENT:  negative except for  visual changes  RESPIRATORY:  negative for  dry cough, cough with sputum, wheezing, and chest pain  CARDIOVASCULAR:  negative for  chest pain, dyspnea, palpitations, early saiety, edema  GASTROINTESTINAL:  negative for nausea, vomiting, diarrhea, and constipation  MUSCULOSKELETAL:  negative  NEUROLOGICAL:  negative except for headaches, dizziness, speech problems, visual disturbance, and weakness    Lines and Devices    Peripheral    Mechanical Ventilation Data   VENT SETTINGS (Comprehensive)     Additional Respiratory Assessments  Heart Rate: 59  Resp: 24  SpO2: 98 %    ABG  No results found for: PH, PCO2, PO2, HCO3, O2SAT  No results found for: IFIO2, MODE, SETTIDVOL, SETPEEP    Vitals    height is 6' (1.829 m) and weight is 158 lb (71.7 kg). His blood pressure is 185/104 (abnormal) and his pulse is 59. His respiration is 24 and oxygen saturation is 98%.        Temperature Range:   No data recorded  BP Range:  Systolic (86GBS), XTL:674 , Min:185 , ZBE:021     Diastolic (48KDA), OVP:842, Min:104, Max:104    Pulse Range: Pulse  Av  Min: 57  Max: 59  Respiration Range: Resp  Av.3  Min: 20  Max: 24  Current Pulse Ox[de-identified]  SpO2: 98 %  24HR Pulse Ox Range:  SpO2  Av %  Min: 98 %  Max: 98 %  Oxygen Amount and Delivery:        I/O (24 Hours)    Patient Vitals for the past 8 hrs:   BP Pulse Resp SpO2 Height Weight   23 0058 -- 59 -- -- -- --   23 0052 -- 57 24 98 % -- --   23 0045 -- 58 20 -- 6' (1.829 m) 158 lb (71.7 kg)   23 0043 (!) 185/104 57 -- -- -- --   23 0032 (!) 185/104 59 20 98 % -- --     No intake or output data in the 24 hours ending 23 0119  No intake/output data recorded. Patient Vitals for the past 96 hrs (Last 3 readings):   Weight   23 004 158 lb (71.7 kg)     Drains/Tubes Outputs  N/A    Exam   PHYSICAL EXAM:  CONSTITUTIONAL:  Ill appearing, awake, alert, cooperative, no apparent distress  EYES:  Lids and lashes normal, pupils equal, round and reactive to light, right eye with gaze  ENT:  Normocephalic, without obvious abnormality, atraumatic, sinuses nontender on palpation, external ears without lesions, oral pharynx with moist mucus membranes  LUNGS:  No increased work of breathing, good air exchange, clear to auscultation bilaterally, no crackles or wheezing  CARDIOVASCULAR:  normal apical pulses, bradycardic with regular rhythm, and normal S1 and S2  ABDOMEN:  No scars, normal bowel sounds, soft, non-distended, non-tender, no masses palpated  MUSCULOSKELETAL:  There is no redness, warmth, or swelling of the joints. Moving all extremities   NEUROLOGIC:  Awake, alert, oriented to name, place and time.  No drift noted with arm/leg raises  SKIN:  no bruising or bleeding, normal skin color, texture, turgor, no redness, warmth, or swelling, no rashes, and no lesions    Data   Old records and images have been reviewed    Lab Results   CBC   No results found for: WBC, RBC, HGB, HCT, PLT, MCV, MCH, MCHC, RDW, NRBC, SEGSPCT, BANDSPCT, BLASTSPCT, METASPCT, LYMPHOPCT, PROMYELOPCT, MONOPCT, MYELOPCT, EOSPCT, BASOPCT, MONOSABS, LYMPHSABS, EOSABS, BASOSABS, DIFFTYPE    BMP No results found for: NA, K, CL, CO2, BUN, CREATININE, GLUCOSE, CALCIUM, IONCA    LFTS  No results found for: ALKPHOS, ALT, AST, PROT, BILITOT, BILIDIR, IBILI, LABALBU    INR  No results for input(s): PROTIME, INR in the last 72 hours. APTT  Recent Labs     01/19/23  2139   APTT 29.3       Lactic Acid  No results found for: LACTA     BNP   No results for input(s): BNP in the last 72 hours. Cultures   No results for input(s): BC in the last 72 hours. No results for input(s): Crissie Soulier in the last 72 hours. No results for input(s): LABURIN in the last 72 hours. Radiology   CXR    CT Scans  CT Head WO Contrast 1/20/23   Subacute infarction localized in the inferior left cerebellar hemisphere in   the PICA distribution. No hemorrhage. Chronic parenchymal change including atrophy and old white matter ischemia.        SYSTEMS ASSESSMENT AND PLAN    Neuro   Subacute infarction left cerebellar hemisphere  Thrombosed left vertebral artery dissection vs. Left vertebral artery thromboembolism, with vertebral artery segment occlusions ( V1, V2, V4 and the left posterior interior cerebellar artery branch)   -MRA/CTA from [facility]   -Neurology consulted, appreciate input   -Vascular surgery consulted, appreciate input  -On exam, patient with worsening headache, repeat CT with subacute infarction, no hemorrhagic conversion   -Low dose heparin gtt per neurology   -ASA and plavix when appropriate   -Bedside swallow   -US of BLE r/o DVT   -Keep SBP <180 mmHg and DBP <110 mmHg   -Obtain Echo    Hx of Alcohol Use   -CIWA   -Consult  when appropriate   -Thiamine 100 mg daily    Respiratory   No acute issues  Hx COPD   -currently on room air with no respiratory distress   -Start Bronvana and Pulmicort  -Keep O2 sat 90-92%    Cardiovascular   Bradycardia, concerns 2/2 cerebellar infarct   -Obtain stat EKG  -Consult electrophysiology, appreciate input greatly  -Keep atropine at the bedside   -Troponin 13  -Obtain Echo  -Obtain lipid panel    Gastrointestinal   No acute issues  GI prophylaxis: PPI    Renal   No acute issues   -Strict I's and O's   -Bladder scan and document as a progress note     Infectious Disease   No acute issues   -Afebrile, no leukocytosis     Hematology/Oncology   No acute issues   -CT with no hemorrhagic conversation   -Anticoagulation: heparin gtt     Endocrine   No acute issues   -Obtain hemoglobin A1C    Social/Spiritual/DNR/Other   Code: Full  Disposition: ICU  Fluids/lytes/Nutrition: NS @ 75cc/hour, replace as needed, NPO to swallow screening      Case and plan discussed with attending on call Dr. Jo Schulte. Rounding attending physician, Dr. Kamini Erickson, updated regarding the case and plan. FLAVIA Arevalo - CNP    1/20/2023, 1:19 AM       I have personally seen and examined the patient in the ICU. I discussed the case in detail with the NP, nursing and respiratory therapist as needed. I reviewed the pertinent laboratory studies, imaging studies, and records. Key elements of the encounter were performed by me (> 85 % time). Family is updated at the bedside as available. Key issues of the case were discussed among consultants. Past medical history, surgical history, family history, and social history are unchanged unless stated in the history of present illness. I have reviewed the patient's medications and allergies. Patient seen and evaluated by me this morning. He did complain of new onset double vision. He reports that he noticed this symptom when he woke up this morning and denies having the symptom last night. He denies any other new changes. Physical exam:  General: Awake, alert, oriented x3.   EENT: PERRL, EOMI  CV: Regular rate and rhythm, no murmurs rubs or gallops  Lungs: Clear to auscultation bilaterally no rhonchi rales or wheezes  Abdomen: Soft nontender nondistended  Extremities: no cyanosis or clubbing. No edema extremities  Neuro: Patient is awake alert and oriented x3. Appropriately follows all commands. Strength was 5 out of 5 and equal bilaterally on my physical exam.  Dysmetria present on left. Acute CVA left cerebellar stroke  Left vertebral artery dissection  Vertical diplopia  Etoh abuse  COPD  Bradycardia  Tobacco abuse    Case discussed with Dr. Segovia Smoker, patient with new onset of diplopia, also with new sensory deficits. STAT MRI ordered. Consult placed to Neuro Interventionalist.   Continue neuro checks  Maintain SBP <180mmHg  Heparin gtt with PTT goal 40-60  Continue atorvastatin  Continue CIWA protocol, thiamine  Continue brovana, pulmicort, duonebs  GI prophylaxis, protonix        This patient is unstable and critically ill with increased risk of clinical deterioration. There are life and organ supporting interventions that require frequent monitoring and personal assessment. There is a high possibility of sudden, clinically significant or life-threatening deterioration in this patient's condition which may require prompt therapeutic interventions. I spent 80 independent minutes of critical care time excluding procedures.       Nagi Russell,

## 2023-01-20 NOTE — PLAN OF CARE
Problem: Discharge Planning  Goal: Discharge to home or other facility with appropriate resources  Outcome: Progressing  Flowsheets (Taken 1/20/2023 0400)  Discharge to home or other facility with appropriate resources: Identify barriers to discharge with patient and caregiver     Problem: Chronic Conditions and Co-morbidities  Goal: Patient's chronic conditions and co-morbidity symptoms are monitored and maintained or improved  Outcome: Progressing  Flowsheets (Taken 1/20/2023 0400)  Care Plan - Patient's Chronic Conditions and Co-Morbidity Symptoms are Monitored and Maintained or Improved: Monitor and assess patient's chronic conditions and comorbid symptoms for stability, deterioration, or improvement     Problem: ABCDS Injury Assessment  Goal: Absence of physical injury  Outcome: Progressing     Problem: Safety - Adult  Goal: Free from fall injury  Outcome: Progressing

## 2023-01-20 NOTE — PROGRESS NOTES
Physical Therapy    PT consult to evaluate/treat received and appreciated. Pt chart reviewed and evaluation attempted. Pt is currently on hold. Awaiting neurology, MRI, and possible neuro intervention prior to mobilization. Will check back as able. Thank you.         Jayashree Macias, PT, DPT   [id number]

## 2023-01-20 NOTE — ANESTHESIA PRE PROCEDURE
Department of Anesthesiology  Preprocedure Note       Name:  Beryle Joiner   Age:  78 y.o.  :  1955                                          MRN:  [id number]         Date:  2023      Surgeon: * No surgeons listed *    Procedure: * No procedures listed *    Medications prior to admission:   Prior to Admission medications    Not on File       Current medications:    Current Facility-Administered Medications   Medication Dose Route Frequency Provider Last Rate Last Admin    ondansetron (ZOFRAN-ODT) disintegrating tablet 4 mg  4 mg Oral Q8H PRN Alex Rose, APRN - CNP        Or    ondansetron (ZOFRAN) injection 4 mg  4 mg IntraVENous Q6H PRN Alex Rose, APRN - CNP        polyethylene glycol (GLYCOLAX) packet 17 g  17 g Oral Daily PRN Alex Rose, APRN - CNP        perflutren lipid microspheres (DEFINITY) injection 1.5 mL  1.5 mL IntraVENous ONCE PRN Alex Rose, APRN - CNP        [START ON 2023] atorvastatin (LIPITOR) tablet 80 mg  80 mg Oral Nightly Alex Rose, APRN - CNP        hydrALAZINE (APRESOLINE) injection 10 mg  10 mg IntraVENous Q6H PRN Alex Rose, APRN - CNP   10 mg at 23 1501    sodium chloride flush 0.9 % injection 5-40 mL  5-40 mL IntraVENous 2 times per day Alex Rose, APRN - CNP   10 mL at 23 0824    sodium chloride flush 0.9 % injection 5-40 mL  5-40 mL IntraVENous PRN Alex Rose, APRN - CNP   10 mL at 23 1319    0.9 % sodium chloride infusion   IntraVENous PRN Alex Rose, APRN - CNP        thiamine (B-1) injection 100 mg  100 mg IntraVENous Daily Alex Rose, APRN - CNP   100 mg at 23 8844    multivitamin 1 tablet  1 tablet Oral Daily Alex Rsoe, APRN - CNP   1 tablet at 23 8847    LORazepam (ATIVAN) tablet 1 mg  1 mg Oral Q1H PRN Alex Rose, APRN - CNP        Or    LORazepam (ATIVAN) injection 1 mg  1 mg IntraVENous Q1H PRN Alex Rose, APRN - CNP        Or    LORazepam (ATIVAN) tablet 2 mg  2 mg Oral Q1H PRN Junita Banker, APRN - CNP        Or    LORazepam (ATIVAN) injection 2 mg  2 mg IntraVENous Q1H PRN Junita Banker, APRN - CNP        Or    LORazepam (ATIVAN) tablet 3 mg  3 mg Oral Q1H PRN Junita Banker, APRN - CNP        Or    LORazepam (ATIVAN) injection 3 mg  3 mg IntraVENous Q1H PRN Junita Banker, APRN - CNP        Or    LORazepam (ATIVAN) tablet 4 mg  4 mg Oral Q1H PRN Junita Banker, APRN - CNP        Or    LORazepam (ATIVAN) injection 4 mg  4 mg IntraVENous Q1H PRN Junita Banker, APRN - CNP        arformoterol tartrate (BROVANA) nebulizer solution 15 mcg  15 mcg Nebulization BID Junita Banker, APRN - CNP   15 mcg at 01/20/23 0838    budesonide (PULMICORT) nebulizer suspension 500 mcg  0.5 mg Nebulization BID [facility], APRN - CNP   500 mcg at 01/20/23 3167    pantoprazole (PROTONIX) 40 mg in sodium chloride (PF) 0.9 % 10 mL injection  40 mg IntraVENous Daily Junita Banker, APRN - CNP   40 mg at 98/24/65 7265    folic acid injection 1 mg  1 mg IntraVENous Daily Lindsey Goodson DO   1 mg at 01/20/23 1317    niCARdipine (CARDENE) 25 mg in sodium chloride 0.9 % 250 mL infusion (Yvin3Cot)  2.5-15 mg/hr IntraVENous Continuous Chris Seaman MD 25 mL/hr at 01/20/23 1635 2.5 mg/hr at 01/20/23 1635       Allergies:  No Known Allergies    Problem List:    Patient Active Problem List   Diagnosis Code    Cerebellar stroke (HCC) I63.9    Vertebral artery dissection (HCC) I77.74    Chronic obstructive pulmonary disease ([facility] [address] J44.9       Past Medical History:  History reviewed. No pertinent past medical history.     Past Surgical History:        Procedure Laterality Date    HERNIA REPAIR         Social History:    Social History     Tobacco Use    Smoking status: Every Day     Packs/day: 0.75     Years: 40.00     Pack years: 30.00     Types: Cigarettes     Start date: 8/5/1979   Delaney Kasper Smokeless tobacco: Never   Substance Use Topics    Alcohol use: Not on file                                Ready to quit: Not Answered  Counseling given: Not Answered      Vital Signs (Current):   Vitals:    01/20/23 1330 01/20/23 1400 01/20/23 1500 01/20/23 1512   BP: (!) 208/91  (!) 184/87 (!) 160/96   Pulse: 63 57 53 64   Resp: 19 17 18 16   Temp:       TempSrc:       SpO2: 98% 98% 99%    Weight:       Height:                                                  BP Readings from Last 3 Encounters:   01/20/23 (!) 160/96   08/05/19 124/76       NPO Status:                            >8hrs                                                      BMI:   Wt Readings from Last 3 Encounters:   01/20/23 158 lb (71.7 kg)     Body mass index is 21.43 kg/m². CBC:   Lab Results   Component Value Date/Time    WBC 5.2 01/20/2023 04:25 AM    RBC 4.01 01/20/2023 04:25 AM    HGB 12.2 01/20/2023 04:25 AM    HCT 35.3 01/20/2023 04:25 AM    MCV 88.0 01/20/2023 04:25 AM    RDW 13.3 01/20/2023 04:25 AM     01/20/2023 04:25 AM       CMP:   Lab Results   Component Value Date/Time     01/20/2023 04:25 AM    K 3.8 01/20/2023 04:25 AM    K 3.8 01/20/2023 01:06 AM     01/20/2023 04:25 AM    CO2 22 01/20/2023 04:25 AM    BUN 11 01/20/2023 04:25 AM    CREATININE 0.6 01/20/2023 04:25 AM    LABGLOM >60 01/20/2023 04:25 AM    GLUCOSE 119 01/20/2023 04:25 AM    PROT 6.5 01/20/2023 04:25 AM    CALCIUM 8.7 01/20/2023 04:25 AM    BILITOT 0.5 01/20/2023 04:25 AM    ALKPHOS 104 01/20/2023 04:25 AM    AST 16 01/20/2023 04:25 AM    ALT 12 01/20/2023 04:25 AM       POC Tests: No results for input(s): POCGLU, POCNA, POCK, POCCL, POCBUN, POCHEMO, POCHCT in the last 72 hours.     Coags:   Lab Results   Component Value Date/Time    APTT 37.2 01/20/2023 10:39 AM       HCG (If Applicable): No results found for: PREGTESTUR, PREGSERUM, HCG, HCGQUANT     ABGs: No results found for: PHART, PO2ART, HXP9DEC, YZI2WUI, BEART, U2HLBSGN     Type & Screen (If Applicable):  No results found for: LABABO, LABRH    Drug/Infectious Status (If Applicable):  No results found for: HIV, HEPCAB    COVID-19 Screening (If Applicable):   Lab Results   Component Value Date/Time    COVID19 Not Detected 01/20/2023 04:25 AM           Anesthesia Evaluation    Airway: Mallampati: II  TM distance: >3 FB   Neck ROM: full  Mouth opening: > = 3 FB   Dental:          Pulmonary:   (+) COPD:  decreased breath sounds                            Cardiovascular:  Exercise tolerance: good (>4 METS),           Rhythm: regular  Rate: normal                    Neuro/Psych:   (+) CVA: residual symptoms,             GI/Hepatic/Renal: Neg GI/Hepatic/Renal ROS            Endo/Other: Negative Endo/Other ROS                    Abdominal:             Vascular: Other Findings:           Anesthesia Plan      general     ASA 4       Induction: intravenous. arterial line  MIPS: Prophylactic antiemetics administered and Postoperative trial extubation. Anesthetic plan and risks discussed with patient and child/children. Plan discussed with CRNA.                     Veronika Amador,    1/20/2023

## 2023-01-20 NOTE — H&P
[facility]  History and Physical      CHIEF COMPLAINT:    No chief complaint on file. Patient of Teri Rader MD presents with:  Acute ischemic stroke [facility])    History of Present Illness:   Patient does not have a past medical history on file. Patient was admitted to [facility] after transfer from an outside facility. Patient was presenting with difficulty walking garbled speech and facial droop. Patient had difficulty walking for 1 week and noticed the speech and facial droop and presented to the ED. While at the outside facility patient was found to have a left cerebellar hemisphere subacute area of infarction. Patient was given aspirin and Plavix and had MRA/CTA with concerns of thrombosed left vertebral artery dissection. Patient was started on low-dose heparin and was transferred to [facility]. Decision made to admit patient to MICU. On evaluation, he is with altered mentation, quite confused and history of alcohol abuse starting to withdraw. He has undergone multiple stents insertion in  vertebral artery per  interventional radiology  Complex neurological case    REVIEW OF SYSTEMS:  Pertinent negatives are above in HPI. 10 point ROS otherwise negative. History reviewed. No pertinent past medical history. History reviewed. No pertinent surgical history. Medications Prior to Admission:    No medications prior to admission. Note that the patient's home medications were reviewed and the above list is accurate to the best of my knowledge at the time of the exam.    Allergies:    Patient has no known allergies. Social History:    reports that he has been smoking cigarettes. He started smoking about 43 years ago. He has a 30.00 pack-year smoking history.  He has never used smokeless tobacco.    Family History:   Unknown to patient at this time      PHYSICAL EXAM:    Vitals:  BP (!) 175/92   Pulse 65   Temp 98 °F (36.7 °C) (Temporal) Resp 16   Ht 6' (1.829 m)   Wt 158 lb (71.7 kg)   SpO2 97%   BMI 21.43 kg/m²       General appearance: NAD, conversant but completely confused and agitated on evaluation  Eyes: Sclerae anicteric, PERRLA  HEENT: AT/NC, MMM  Neck: FROM, supple, no thyromegaly  Lymph: No cervical / supraclavicular lymphadenopathy  Lungs: Clear to auscultation, WOB normal  CV: Internet bradycardia no MRGs, no lower extremity edema  Abdomen: Soft, non-tender; no masses or HSM, +BS  Extremities: FROM without synovitis. No clubbing or cyanosis of the hands. Skin: no rash, induration, lesions, or ulcers  Unable to get closer assessment of neuro and psych systems    LABS:  All labs reviewed.   Of note:  CBC with Differential:    Lab Results   Component Value Date/Time    WBC 5.2 01/20/2023 04:25 AM    RBC 4.01 01/20/2023 04:25 AM    HGB 12.2 01/20/2023 04:25 AM    HCT 35.3 01/20/2023 04:25 AM     01/20/2023 04:25 AM    MCV 88.0 01/20/2023 04:25 AM    MCH 30.4 01/20/2023 04:25 AM    MCHC 34.6 01/20/2023 04:25 AM    RDW 13.3 01/20/2023 04:25 AM    LYMPHOPCT 30.4 01/20/2023 04:25 AM    MONOPCT 7.4 01/20/2023 04:25 AM    BASOPCT 0.4 01/20/2023 04:25 AM    MONOSABS 0.38 01/20/2023 04:25 AM    LYMPHSABS 1.57 01/20/2023 04:25 AM    EOSABS 0.05 01/20/2023 04:25 AM    BASOSABS 0.02 01/20/2023 04:25 AM     CMP:    Lab Results   Component Value Date/Time     01/20/2023 04:25 AM    K 3.8 01/20/2023 04:25 AM    K 3.8 01/20/2023 01:06 AM     01/20/2023 04:25 AM    CO2 22 01/20/2023 04:25 AM    BUN 11 01/20/2023 04:25 AM    CREATININE 0.6 01/20/2023 04:25 AM    LABGLOM >60 01/20/2023 04:25 AM    GLUCOSE 119 01/20/2023 04:25 AM    PROT 6.5 01/20/2023 04:25 AM    LABALBU 4.0 01/20/2023 04:25 AM    CALCIUM 8.7 01/20/2023 04:25 AM    BILITOT 0.5 01/20/2023 04:25 AM    ALKPHOS 104 01/20/2023 04:25 AM    AST 16 01/20/2023 04:25 AM    ALT 12 01/20/2023 04:25 AM       Imaging:  CT head: Subacute infarct localized in the inferior left cerebellar hemisphere in the PICA distribution. No hemorrhage. Chronic parenchymal change including atrophy and old white matter ischemia. EKG:  I've personally reviewed the patient's EKG:  Sinus bradycardia    Telemetry:  I've personally reviewed the patient's telemetry:      ASSESSMENT/PLAN:  Principal Problem:    Acute ischemic stroke [facility])  Resolved Problems:    * No resolved hospital problems. *    81-year-old male who developed slurred speech and difficulty walking presented to an outside facility was diagnosed with subacute infarct and was transferred to [address] [place] [address] MICU with    Acute ischemic cerebellar stroke  Patient agitated and confused and really not able to provide much history  MRA/CTA from [place]-thrombus left vertebral artery dissection versus left vertebral artery thromboembolism  Low-dose heparin drip-with titration parameters  1/20/2023-underwent extensive reconstruction, thrombectomy and vertebral artery stenting-please electronic medical  Radiology's procedure notes below  ASA and Brilinta, Integrilin drip has been initiated   high intensity statin, blood pressure control  Swallow study once patient able to tolerate  Echocardiogram - pending  Ultrasound bilateral lower extremity-rule out DVT  IV hydration  Smoking cessation  Neurology and neuro interventional radiology following    Bradycardia of unknown etiology  Hold offending agents  Keep atropine at bedside  Check lipid panel  Consult EP,-await input possible pacemaker placement    Medication for other comorbidities continue as appropriate dose adjustment as necessary.   Multiple consultants on board    DVT prophylaxis- heparin drip  PT OT  Discharge planning        Code status: Full  Requires inpatient level of care    Madeline Ramos MD  8:51 AM  1/20/2023

## 2023-01-20 NOTE — LETTER
[facility] Medicaid  CERTIFICATION OF NECESSITY  FOR NON-EMERGENCY TRANSPORTATION   BY GROUND AMBULANCE      Individual Information   1. Name: Fermin Hale 2. [place] Medicaid Billing Number:    3. Address: [address] [place] [address] Road      Transportation Provider Information   4. Provider Name:    5. [place] Medicaid Provider Number:  National Provider Identifier (NPI):      Certification  7. Criteria:  During transport, this individual requires:  [x] Medical treatment or continuous     supervision by an EMT. [] The administration or regulation of oxygen by another person. [] Supervised protective restraint. 8. Period Beginning Date:    5. Length  [x] Not more than 1 day(s)  [] One Year     Additional Information Relevant to Certification   10. Comments or Explanations, If Necessary or Appropriate   ACUTE ISCHEMIC STROKE,LEFT COORDINATION DEFICITS, [place] [address] Practitioner Information   11. Name of Practitioner: Dr Nguyễn Mcdaniel MD   12. [place] Medicaid Provider Number, If Applicable:  [address] Provider Identifier (NPI):      Signature Information   14. Date of Signature:  13. Name of Person Signin. Signature and Professional Designation:      [facility] [id number]  Rev. 2015  [address] [place] [address] Encounter Date/Time: 2023 [address] [facility] Account: [de-identified]    MRN: [id number]    Patient: Fermin Hale    Contact Serial #: [id number]      ENCOUNTER          Patient Class: I Private Enc?   No [facility]   Hospital Service: MED   Encounter DX: Acute ischemic stroke (H*   ADM Provider: Nguyễn Mcdaniel MD   Procedure:     ATT Provider: Nguyễn Mcdaniel MD   REF Provider: [facility]  Admission DX: Acute ischemic stroke [facility]) and DX codes: I63.9      PATIENT                 Name: Fermin Hale : 1955 (67 yrs)   Address: [facility] [place] Sex: Male   [place]: [facility] [address]         Marital Status:    Employer: UNKNOWN         [place]: [facility]   Primary Care Provider: Brenda Garcia MD         Primary Phone: [phone]   EMERGENCY CONTACT   Contact Name Legal Guardian? Relationship to Patient Home Phone Work Phone   1. [address]  2. Lindsey Felix    No Parent  Brother/Sister [phone]              GUARANTOR  Guarantor: Cherie Sanchez     : 1955   Address: [address] [address] [place] Sex: Male   [place] [address]     Relation to Patient: Self       Home Phone: [phone]   Guarantor ID: [id number]       Work Phone:     Guarantor Employer: UNKNOWN         Status: FULL TIME      COVERAGE        PRIMARY INSURANCE   Payor: Sharene Petty MEDICARE Plan: [facility] MEDICARE ADVANTAGE*   Payor Address: [facility] [id number],  [address]       Group Number: [id number] Insurance Type: INDEMNITY   Subscriber Name: Wayne Blankenship : 1955   Subscriber ID: [id number] Pat.  Rel. to Sub: Self   SECONDARY INSURANCE   Payor:   Plan:     Payor Address:  ,           Group Number:   Insurance Type:     Subscriber Name:   Subscriber

## 2023-01-20 NOTE — PROGRESS NOTES
SPEECH/LANGUAGE PATHOLOGY  CLINICAL ASSESSMENT OF SWALLOWING FUNCTION   and PLAN OF CARE    PATIENT NAME:  Jon Junior  (male)     MRN:  [id number]    :  1955  (78 y.o.)  STATUS:  Inpatient: Room 4410/4410-A    TODAY'S DATE:  23    Speech Language Pathology (SLP) eval and treat  Start:  23,   End:  23,   ONE TIME,   Standing Count:  1 Occurrences,   R         Catherine Mullins APRN - CNP   REASON FOR REFERRAL: assess oropharyngeal swallow function   EVALUATING THERAPIST: CHAAPRRITA Porter                 RESULTS:    DYSPHAGIA DIAGNOSIS:   Clinical indicators of suspected pharyngeal phase dysphagia       DIET RECOMMENDATIONS:  NPO until MBSS can be completed        FEEDING RECOMMENDATIONS:     Assistance level:  Not applicable      Compensatory strategies recommended: Not applicable      Discussed recommendations with nursing and/or faxed report to referring provider: Yes    SPEECH THERAPY  PLAN OF CARE   The dysphagia POC is established based on physician order, dysphagia diagnosis and results of clinical assessment     Will establish POC once MBSS is completed. Conditions Requiring Skilled Therapeutic Intervention for dysphagia:    Throat clearing during PO intake   Coughing during PO intake      Specific dysphagia interventions to include:     Trials of upgraded diet/liquid     Specific instructions for next treatment:  MBSS to be completed  Patient Treatment Goals:    Short Term Goals:  Pt will participate in MBSS to fully assess oropharyngeal swallow function and to assist in determining the least restrictive PO diet to maintain adequate nutrition/hydration     Long Term Goals:   Pt will improve oropharyngeal swallow function to ensure airway protection during PO intake to maintain adequate nutrition/hydration and decrease signs/symptoms of aspiration to less than 1 x/day.    OTHER RECOMMENDATIONS:  A Video Swallow Study (MBSS) is recommended and requires a physician order      Patient/family Goal:    Did not state. Will further assess during treatment. Plan of care discussed with Patient and Family   The Patient and Family understand(s) the diagnosis, prognosis and plan of care     Rehabilitation Potential/Prognosis: pending results of MBSS                    ADMITTING DIAGNOSIS: Acute ischemic stroke ([facility] [address] [I63.9]    VISIT DIAGNOSIS:      PATIENT REPORT/COMPLAINT: patient currently NPO pending results of this evaluation  RN/ Physician cleared patient for participation in assessment     yes     PRIOR LEVEL OF SWALLOW FUNCTION:    PAST HISTORY OF DYSPHAGIA?: none reported    Home diet: Regular consistency solids (IDDSI level 7) with  thin liquids (IDDSI level 0)  Current Diet Order:  Diet NPO    PROCEDURE:  Consistencies Administered During the Evaluation   Liquids: ice chips   Solids:  pureed foods      Method of Intake:   cup, spoon  Fed by clinician      Position:   Seated, upright    CLINICAL ASSESSMENT:  Oral Stage: The oral stage of swallowing was within functional limits for consistencies administered      Pharyngeal Stage:    Throat clearing present after presentation of thin liquid  Immediate wet cough was noted after presentation of thin liquid  Latent wet cough was noted after presentation of thin liquid  Wet/gurgly vocal quality was noted after presentation of all consistencies administered  Multiple swallows were noted after presentation of all consistencies administered    Cognition:   Within functional limits for this exam    Oral Peripheral Examination   Left labiobuccal weakness    Current Respiratory Status    room air     Parameters of Speech Production  Respiration:  Adequate for speech production  Quality:   Hoarse  Intensity: Within functional limits    Volitional Swallow: present     Volitional Cough:   present     Pain: No pain reported.     EDUCATION:   The Speech Language Pathologist (SLP) completed education regarding results of evaluation and that intervention is warranted at this time. Learner: Patient and Family  Education: Reviewed results and recommendations of this evaluation, Reviewed diet and strategies, Reviewed signs, symptoms and risks of aspiration, Reviewed recommendations for follow-up, and Education Related to Potential Risks and Complications Due to Impairment/Illness/Injury  Evaluation of Education:  Verbalizes understanding    This plan may be re-evaluated and revised as warranted. Evaluation Time includes thorough review of current medical information, gathering information on past medical history/social history and prior level of function, completion of standardized testing/informal observation of tasks, assessment of data and education on plan of care and goals. [x]The admitting diagnosis and active problem list, have been reviewed prior to initiation of this evaluation. ACTIVE PROBLEM LIST:   Patient Active Problem List   Diagnosis    Acute ischemic stroke [facility])         CPT code:  00592  bedside swallow eval    INTERVENTION  CPT Code: 15723  dysphagia tx    Speech Pathologist (SLP) completed education with the patient/family regarding type of swallowing impairment. Reviewed current solid/liquid consistency diet recommendations and discussed compensatory strategies to ensure safe PO intake. Reviewed aspiration precautions. Encouraged patient and/or family to engage SLP in unstructured Q&A session relative to identified deficit areas; indicated understanding of all information provided via satisfactory verbal response.

## 2023-01-20 NOTE — PROGRESS NOTES
Occupational Therapy    Date:2023  Patient Name: David Sevilla  MRN: [id number]  : 1955  Room: [address] [place] [address]-A     OT orders received and chart reviewed. OT eval on hold at this time pending neurology POC & potential for neuro intervention per RN. OT will follow and re-attempt eval as appropriate at a later time/date.     Eulogio Mcgowan OTR/L; [id number]

## 2023-01-20 NOTE — CONSULTS
NEUROLOGY CONSULT NOTE      Requesting Physician:  FLAVIA Cleary - BERT    Reason for Consult:  Evaluate for cerebellar stroke with acute left vertebral artery dissection. History of Present Illness:  Nora Esqueda is a 78 y.o. male  with no known medical history since patient has not been managed medically for some time who was admitted to [facility] on 1/20/2023 on transfer from THE [facility] with presentation of ataxia and left cerebellar stroke. Patient states his last known well was on Tuesday-1/17/2023. He awoke the following morning and noticed that he was having difficulty with imbalance and movement. He states whenever he tried to walk he was falling to the side and could not control his balance. There was no report of any dizziness or vertigo associated. Patient nconcern for possible stroke and was therefore taking 2 centimeters off for evaluation. He was subsequently admitted to the hospital because noted problems with garbled speech and facial droop along with his ataxia. Problems with speech and facial droop present on presentation with subsequent CT scanning of the brain done showing left cerebellar infarct. Patient was placed on aspirin and clopidogrel with MRI and CTA ordered. Subsequent CTA study done showing a thrombosed left vertebral artery dissection in prompting call for neurology evaluation and transfer to this facility. Given dissection therapy discussion initiated and patient recommended for start on heparin drip at that facility prior to transfer. Since transfer patient has developed vertical diplopia. because of thePast Medical History:    History reviewed. No pertinent past medical history. History reviewed. No pertinent surgical history.     Social History:  Social History     Tobacco Use   Smoking Status Every Day    Packs/day: 0.75    Years: 40.00    Pack years: 30.00    Types: Cigarettes    Start date: 8/5/1979   Smokeless Tobacco Never Social History     Substance and Sexual Activity   Alcohol Use None     Social History     Substance and Sexual Activity   Drug Use Not on file         Family History:   History reviewed. No pertinent family history. Review of Systems:  All systems reviewed are negative except what is mentioned in history of present illness. Allergies:    No Known Allergies     Current Medications:   ondansetron (ZOFRAN-ODT) disintegrating tablet 4 mg, Q8H PRN   Or  ondansetron (ZOFRAN) injection 4 mg, Q6H PRN  polyethylene glycol (GLYCOLAX) packet 17 g, Daily PRN  perflutren lipid microspheres (DEFINITY) injection 1.5 mL, ONCE PRN  [START ON 1/21/2023] atorvastatin (LIPITOR) tablet 80 mg, Nightly  atropine 1 MG/10ML injection,   hydrALAZINE (APRESOLINE) injection 10 mg, Q6H PRN  sodium chloride flush 0.9 % injection 5-40 mL, 2 times per day  sodium chloride flush 0.9 % injection 5-40 mL, PRN  0.9 % sodium chloride infusion, PRN  thiamine (B-1) injection 100 mg, Daily  multivitamin 1 tablet, Daily  nicotine (NICODERM CQ) 21 MG/24HR 1 patch, Daily  LORazepam (ATIVAN) tablet 1 mg, Q1H PRN   Or  LORazepam (ATIVAN) injection 1 mg, Q1H PRN   Or  LORazepam (ATIVAN) tablet 2 mg, Q1H PRN   Or  LORazepam (ATIVAN) injection 2 mg, Q1H PRN   Or  LORazepam (ATIVAN) tablet 3 mg, Q1H PRN   Or  LORazepam (ATIVAN) injection 3 mg, Q1H PRN   Or  LORazepam (ATIVAN) tablet 4 mg, Q1H PRN   Or  LORazepam (ATIVAN) injection 4 mg, Q1H PRN  heparin 25,000 units in dextrose 5% 250 mL (premix) infusion, Continuous  arformoterol tartrate (BROVANA) nebulizer solution 15 mcg, BID  budesonide (PULMICORT) nebulizer suspension 500 mcg, BID  pantoprazole (PROTONIX) 40 mg in sodium chloride (PF) 0.9 % 10 mL injection, Daily  folic acid injection 1 mg, Daily         Physical Exam:  BP (!) 172/99   Pulse 57   Temp 98 °F (36.7 °C)   Resp 15   Ht 6' (1.829 m)   Wt 158 lb (71.7 kg)   SpO2 97%   BMI 21.43 kg/m²  I Body mass index is 21.43 kg/m².  I Wt Readings from Last 1 Encounters:   01/20/23 158 lb (71.7 kg)          HEENT: Normocephalic, atraumatic, no lesions or abnormalities noted. Neck:  supple with full ROM; no masses, nodes or bruits; no cervical tenderness on palpation. Lungs:  clear to auscultation  bilaterally     CV: RRR without gallops or murmurs     Extremities: no c/c/e           Neurologic Exam   Mental Status:  Patient was alert, responsive, oriented, appropriate, answering questions, and following commands. Speech was fluent with normal sensorium and cognition. Cranial Nerves: Pupils were equal round and reactive to light;    Visual fields were full on confrontation; Extraocular movements were intact; no nystagmus; Intact facial sensation to temp, pinprick, and light touch; Symmetric facial movements with good lip and eye closure bilaterally; Hearing was intact; Cardenas was midline;    Normal palatal elevation with midline uvula  Trapezius strength of 5/5. Tongue was midline with no atrophy or fasciculations  Motor Exam:  Strength was 5/5 throughout; normal tone and bulk; no atrophy or fasiculations noted. Sensory Exam: Decreased sensation to pinprick temperature left face and right hemibody with otherwise normal sensation to all modalities. Cerebella Exam: Positive dysmetria on the left side involving arm and leg with no tremor and only mild left arm rebound. Normal cerebellar function on the right side with no tremors or abnormal movements. Gait:  Gait was not tested.    Reflexes: normal and symmetric bilaterally; Babinski is negative    Labs:    CBC:   Recent Labs     01/20/23  0106 01/20/23  0425   WBC 5.7 5.2   HGB 12.5 12.2*    267   MCV 91.2 88.0   MCH 31.3 30.4   MCHC 34.3 34.6*   RDW 13.2 13.3     CMP:  Recent Labs     01/20/23  0106 01/20/23  0425    134   K 3.8 3.8    102   CO2 20* 22   BUN 12 11   CREATININE 0.6* 0.6*   LABGLOM >60 >60   GLUCOSE 118* 119*   CALCIUM 8.6 8.7 Liver:   Recent Labs     01/20/23  0425   AST 16   ALT 12   ALKPHOS 104   PROT 6.5   LABALBU 4.0   BILITOT 0.5     INR: No results for input(s): PROTIME, INR in the last 72 hours. ToxicologyNo results for input(s): PHENYTOIN, CARBTOT, PHENOBARB, VALPROATE, LAMOTRIG in the last 72 hours. Invalid input(s):  KEPPRA  No results for input(s): AMPMETHURSCR, BARBTQTU, BDZQTU, CANNABQUANT, COCMETQTU, OPIAU, PCPQUANT in the last 72 hours. Radiology:  CT HEAD WO CONTRAST    Result Date: 1/20/2023  EXAMINATION: CT OF THE HEAD WITHOUT CONTRAST  1/20/2023 12:42 am TECHNIQUE: CT of the head was performed without the administration of intravenous contrast. Automated exposure control, iterative reconstruction, and/or weight based adjustment of the mA/kV was utilized to reduce the radiation dose to as low as reasonably achievable. COMPARISON: None. HISTORY: ORDERING SYSTEM PROVIDED HISTORY: follow up ischemic stroke, [facility] TECHNOLOGIST PROVIDED HISTORY: Reason for exam:->follow up ischemic stroke, AMS Has a \"code stroke\" or \"stroke alert\" been called? ->Yes What reading provider will be dictating this exam?->CRC FINDINGS: BRAIN/VENTRICLES: There is abnormal amorphous low attenuation obscuring architecture of the left cerebellar hemisphere. The features have high association with acute infarction associated with the posterior inferior cerebellar artery. There is cerebral atrophy with associated ex vacuo dilatation of the ventricular system. There is mild ill-defined low-attenuation in the periventricular white matter, corona radiata, and centrum semiovale bilaterally which has association with age related microvascular white matter ischemic disease. There is mild atherosclerotic plaque in the bilateral carotid canal, carotid siphon, and cavernous internal carotid artery (<50%). There is no acute intracranial hemorrhage, mass effect, or midline shift. No abnormal extra-axial fluid collection.   There is no evidence of hydrocephalus. ORBITS: The visualized portion of the orbits demonstrate no acute abnormality. SINUSES: There is mild polypoid perimucosal thickening in the right sphenoid sinus and posterior left-sided ethmoid air cells. The visualized mastoid air cells demonstrate no acute abnormality. SOFT TISSUES/SKULL:  No acute abnormality of the visualized skull or soft tissues. Subacute infarction localized in the inferior left cerebellar hemisphere in the PICA distribution. No hemorrhage. Chronic parenchymal change including atrophy and old white matter ischemia. The patient's records from referring provider and available information in the EHR was reviewed. Impression:  Left vertebral artery dissection  Acute CVA left cerebellar stroke  Vertical diplopia   Left side dysmetria  Left facial and right hemibody sensory deficit  Ataxia    Patient presenting with acute left vertebral artery dissection with associated stroke. Large left cerebellar lesion but there are also smaller lesions indicating multifocal stroke in left PICA distribution. Patient's last known well was 3 days ago with onset of symptoms on awakening 2 days ago. He initially started having problems with gait and motor control with unsteadiness noted. No weakness, sensory changes, speech problems, vision changes, or abnormal movements associated. He was seen at an outside facility where a CT scan of the head showed presence of a left vertebral artery dissection with left cerebellar stroke. Patient was discussed with physician at THE [facility] and was subsequently transferred to [facility] for further management after he was started on heparin drip for transfer. Patient has since developed onset of diplopia since hospitalized, with exam now showing left-sided dysmetria as well as left face and right hemibody sensory changes suggesting likely brainstem CVA.   We will proceed with stat MRI and continuation of heparin drip at this point pending endovascular consultation by Dr. Heather Groves for Possible Vertebral Artery Stenting. Principal Problem:    Acute ischemic stroke [facility])  Resolved Problems:    * No resolved hospital problems. *      Recommendations:                                            Heparin drip (target PTT 40-60) for acute vertebral artery dissection and stroke. Stat MRI brain without contrast for further evaluation. Neuro endovascular consult for possible vertebral artery stenting. PT/OT/SLP evaluation and therapy. Statin therapy for stroke prevention  Case discussed with patient and family at bedside. Detailed nature of stroke and vertebral artery dissection along with potential risk including risk for brainstem stroke. Patient now with progression of symptoms including new onset diplopia and left facial and right hemibody sensory deficits suggesting likely brainstem stroke. Indicated likely need for possible intervention such as vertebral artery stent to be discussed further with Neuroendovascular consultant. It was my pleasure to evaluate Laure Tobar today. Please call with questions.       Electronically signed by Jorje Wallace MD on 1/20/2023 at 10:50 AM

## 2023-01-20 NOTE — PLAN OF CARE
Problem: Discharge Planning  Goal: Discharge to home or other facility with appropriate resources  1/20/2023 0713 by Shelbie Christiansen RN  Outcome: Progressing  1/20/2023 0448 by Christopher Pratt RN  Outcome: Progressing  Flowsheets (Taken 1/20/2023 0400)  Discharge to home or other facility with appropriate resources: Identify barriers to discharge with patient and caregiver     Problem: Chronic Conditions and Co-morbidities  Goal: Patient's chronic conditions and co-morbidity symptoms are monitored and maintained or improved  1/20/2023 0713 by Shelbie Christiansen RN  Outcome: Progressing  1/20/2023 0448 by Christopher Pratt RN  Outcome: Progressing  Flowsheets (Taken 1/20/2023 0400)  Care Plan - Patient's Chronic Conditions and Co-Morbidity Symptoms are Monitored and Maintained or Improved: Monitor and assess patient's chronic conditions and comorbid symptoms for stability, deterioration, or improvement     Problem: ABCDS Injury Assessment  Goal: Absence of physical injury  1/20/2023 0713 by Shelbie Christiansen RN  Outcome: Progressing  1/20/2023 0448 by Christopher Pratt RN  Outcome: Progressing     Problem: Safety - Adult  Goal: Free from fall injury  1/20/2023 0713 by Shelbie Christiansen RN  Outcome: Progressing  1/20/2023 0448 by Christopher Pratt RN  Outcome: Progressing

## 2023-01-20 NOTE — PROCEDURES
NEUROINTERVENTION PROCEDURE NOTE    PATIENT NAME: Brigida Tarango  MRN: [id number]  : 1955  DATE OF PROCEDURE: 23    Stroke Metrics  NIHSS prior to procedure: 7  IV TPA Administered: [] Yes  [x]  No  Consent obtained: [x] Yes  []  No  by Dr. Paez Mercury Pulses checked: +2 bilaterally    Neurointerventionalist: Abdiaziz Elkins MD  1st assistant Harvinder Pisano      Time Event Device Notes   1724 Access site puncture          1730 1st pass suction TICI Reperfusion stgstrstastdstest:st st1st 1738 2nd pass suction TICI Reperfusion stgstrstastdstest:st st1st 1744 3rd pass suction TICI Reperfusion ndgndrndanddndend:nd2nd 1746 4th pass suction TICI Reperfusion grade:    1818 5th pass suction    1825 6th pass suction    1828 7th pass suction    1829 8th pass Right PCA          1848 Access site closure  Sheath pulled and  angioseal used to close arterial puncture. Puncture site cleansed and dry dressing applied. No bleeding, swelling or complications noted, no change in pulses. IA tPA adminstered: [] Yes  [x]  No       Time Event Dose     IA tPA administered      IA tPA administered      IA tPA administered       Post-procedure NIHSS unable to assess due to anesthesia/sedation          CT head completed.     ***  Patient transported to {Blank single:49829::\"NSIC\",\"MICU\",\"PACU\",\"SICU\",\"CVIC\"}  and handoff report given to ***    Family updated: [x] Yes  []  No

## 2023-01-20 NOTE — PROGRESS NOTES
SPEECH/LANGUAGE PATHOLOGY  SPEECH/LANGUAGE/COGNITIVE EVALUATION   and PLAN OF CARE      PATIENT NAME:  Deysi Thomas  (male)     MRN:  [id number]    :  1955  (78 y.o.)  STATUS:  Inpatient: Room 4410/4410-A    TODAY'S DATE:  23    Speech Language Pathology (SLP) eval and treat  Start:  23,   End:  23,   ONE TIME,   Standing Count:  1 Occurrences,   R         Erven Formosa, APRN - CNP   REASON FOR REFERRAL:  assess speech/language/cognition  EVALUATING THERAPIST: CHAPARRITA Mack    [facility]AG[facility]IS: [facility] [[facility]9]    VISIT DIAGNOSIS:        SPEECH THERAPY  PLAN OF CARE   The speech therapy  POC is established based on physician order, speech pathology diagnosis and results of clinical assessment     SPEECH PATHOLOGY DIAGNOSIS:    Mild Dysarthria, Moderate Dysphonia    Speech Pathology intervention is recommended up to 6 times per week for LOS or when goals are met with emphasis on the following:      Conditions Requiring Skilled Therapeutic Intervention for speech, language and/or cognition    Dysarthria   Voice impairment    Specific Speech Therapy Interventions to Include: Therapeutic exercises for dysarthria    Specific instructions for next treatment: To initiate POC    SHORT/LONG TERM GOALS  Pt will improve speech intelligibility and increased articulatory precision and vocal quality via compensatory strategies and oral motor exercises     Patient goals: Patient/family involved in developing goals and treatment plan:   Treatment goals discussed with Patient and Family    The Patient and Family understand(s) the diagnosis, prognosis and plan of care   The patient/family Agreed with above,     This plan may be re-evaluated and revised as warranted.         Rehabilitation Potential/Prognosis: good                CLINICAL ASSESSMENT:  MOTOR SPEECH       Oral Peripheral Examination   Left labiobuccal weakness    Parameters of Speech Production  Respiration:  Adequate for speech production  Articulation:  Distortion  Resonance:  Within functional limits  Quality:   Hoarse  Pitch: Within functional limits  Intensity: Within functional limits  Fluency:  Intact  Prosody Intact    RECEPTIVE LANGUAGE    Comprehension of Yes/No Questions: Within functional limits    Process  Simple Verbal Commands:   Within functional limits  Process Intermediate Verbal Commands:   Within functional limits  Process Complex Verbal Commands:     Inconsistent    Comprehension of Conversation:      Within functional limits      EXPRESSIVE LANGUAGE     Serials: Functional    Imitation:  Words   Functional   Sentences Functional    Naming:  (Modality used:  Verbal)  Confrontation Naming  Functional  Functional Description  Functional  Response Naming: Functional    Conversation:      Conversation was within functional limits    COGNITION     Attention/Orientation  Attention: Sustained attention   Orientation:  Oriented to Person, Place, Date, Reason for hospitalization    Memory   Immediate Recall: Repeated 3/3    Delayed Recall:   Recalled 3/3 with min cues    Long Term Recall:   Recalled Address, Birthdate, Age, and Family    Organization/Problem Solving/Reasoning   Verbal Sequencing:   Functional        Verbal Problem solving:   Functional          CLINICAL OBSERVATIONS NOTED DURING THE EVALUATION  Within functional limits                  EDUCATION:   The Speech Language Pathologist (SLP) completed education regarding results of evaluation and that intervention is warranted at this time.   Learner: Patient and Family  Education: Reviewed results and recommendations of this evaluation  Evaluation of Education:  Verbalizes understanding    Evaluation Time includes thorough review of current medical information, gathering information on past medical history/social history and prior level of function, completion of standardized testing/informal observation of tasks, assessment of data and education on plan of care and goals. CPT code:    58317  eval speech sound lang comprehension      The admitting diagnosis and active problem list, as listed below have been reviewed prior to initiation of this evaluation. ACTIVE PROBLEM LIST:   Patient Active Problem List   Diagnosis    Acute ischemic stroke [facility])       INTERVENTION  CPT Code: 15581  speech/language tx    Speech Pathologist (SLP) completed education with the patient/family regarding type of speech impairment. Discussed compensatory strategies to promote increased speech intelligibility, including slow rate of delivery and exaggerated articulation. Reviewed oral motor exercises as/if appropriate. Encouraged pt and/or family to engage SLP in unstructured Q&A session relative to identified deficit areas; indicated understanding of all information provided via satisfactory verbal response.

## 2023-01-21 ENCOUNTER — APPOINTMENT (OUTPATIENT)
Dept: CT IMAGING | Age: 68
DRG: 270 | End: 2023-01-21
Attending: INTERNAL MEDICINE
Payer: MEDICARE

## 2023-01-21 ENCOUNTER — APPOINTMENT (OUTPATIENT)
Dept: ULTRASOUND IMAGING | Age: 68
DRG: 270 | End: 2023-01-21
Attending: INTERNAL MEDICINE
Payer: MEDICARE

## 2023-01-21 PROBLEM — R00.1 SINUS BRADYCARDIA: Status: ACTIVE | Noted: 2023-01-21

## 2023-01-21 LAB
ALBUMIN SERPL-MCNC: 3.9 G/DL (ref 3.5–5.2)
ALBUMIN SERPL-MCNC: 4.1 G/DL (ref 3.5–5.2)
ALP BLD-CCNC: 105 U/L (ref 40–129)
ALP BLD-CCNC: 110 U/L (ref 40–129)
ALT SERPL-CCNC: 11 U/L (ref 0–40)
ALT SERPL-CCNC: 13 U/L (ref 0–40)
ANION GAP SERPL CALCULATED.3IONS-SCNC: 13 MMOL/L (ref 7–16)
ANION GAP SERPL CALCULATED.3IONS-SCNC: 15 MMOL/L (ref 7–16)
APTT: 27.7 SEC (ref 24.5–35.1)
APTT: 28 SEC (ref 24.5–35.1)
AST SERPL-CCNC: 15 U/L (ref 0–39)
AST SERPL-CCNC: 16 U/L (ref 0–39)
BASOPHILS ABSOLUTE: 0.01 E9/L (ref 0–0.2)
BASOPHILS RELATIVE PERCENT: 0.2 % (ref 0–2)
BILIRUB SERPL-MCNC: 0.4 MG/DL (ref 0–1.2)
BILIRUB SERPL-MCNC: 0.5 MG/DL (ref 0–1.2)
BUN BLDV-MCNC: 10 MG/DL (ref 6–23)
BUN BLDV-MCNC: 12 MG/DL (ref 6–23)
CALCIUM IONIZED: 1.37 MMOL/L (ref 1.15–1.33)
CALCIUM SERPL-MCNC: 8.6 MG/DL (ref 8.6–10.2)
CALCIUM SERPL-MCNC: 8.9 MG/DL (ref 8.6–10.2)
CHLORIDE BLD-SCNC: 99 MMOL/L (ref 98–107)
CHLORIDE BLD-SCNC: 99 MMOL/L (ref 98–107)
CHLORIDE URINE RANDOM: 127 MMOL/L
CO2: 19 MMOL/L (ref 22–29)
CO2: 19 MMOL/L (ref 22–29)
CORTISOL TOTAL: 2.01 MCG/DL (ref 2.68–18.4)
CREAT SERPL-MCNC: 0.5 MG/DL (ref 0.7–1.2)
CREAT SERPL-MCNC: 0.6 MG/DL (ref 0.7–1.2)
CREATININE URINE: 163 MG/DL (ref 40–278)
EOSINOPHILS ABSOLUTE: 0 E9/L (ref 0.05–0.5)
EOSINOPHILS RELATIVE PERCENT: 0 % (ref 0–6)
GFR SERPL CREATININE-BSD FRML MDRD: >60 ML/MIN/1.73
GFR SERPL CREATININE-BSD FRML MDRD: >60 ML/MIN/1.73
GLUCOSE BLD-MCNC: 108 MG/DL (ref 74–99)
GLUCOSE BLD-MCNC: 120 MG/DL (ref 74–99)
HCT VFR BLD CALC: 33.6 % (ref 37–54)
HEMOGLOBIN: 11.8 G/DL (ref 12.5–16.5)
IMMATURE GRANULOCYTES #: 0.02 E9/L
IMMATURE GRANULOCYTES %: 0.3 % (ref 0–5)
LV EF: 63 %
LVEF MODALITY: NORMAL
LYMPHOCYTES ABSOLUTE: 0.6 E9/L (ref 1.5–4)
LYMPHOCYTES RELATIVE PERCENT: 9.3 % (ref 20–42)
MAGNESIUM: 1.7 MG/DL (ref 1.6–2.6)
MCH RBC QN AUTO: 30.5 PG (ref 26–35)
MCHC RBC AUTO-ENTMCNC: 35.1 % (ref 32–34.5)
MCV RBC AUTO: 86.8 FL (ref 80–99.9)
MONOCYTES ABSOLUTE: 0.26 E9/L (ref 0.1–0.95)
MONOCYTES RELATIVE PERCENT: 4 % (ref 2–12)
MRSA CULTURE ONLY: NORMAL
NEUTROPHILS ABSOLUTE: 5.53 E9/L (ref 1.8–7.3)
NEUTROPHILS RELATIVE PERCENT: 86.2 % (ref 43–80)
OSMOLALITY URINE: 837 MOSM/KG (ref 300–900)
PDW BLD-RTO: 13.2 FL (ref 11.5–15)
PHOSPHORUS: 3.9 MG/DL (ref 2.5–4.5)
PLATELET # BLD: 270 E9/L (ref 130–450)
PMV BLD AUTO: 9.1 FL (ref 7–12)
POTASSIUM SERPL-SCNC: 3.6 MMOL/L (ref 3.5–5)
POTASSIUM SERPL-SCNC: 3.9 MMOL/L (ref 3.5–5)
POTASSIUM, UR: 86 MMOL/L
RBC # BLD: 3.87 E12/L (ref 3.8–5.8)
SODIUM BLD-SCNC: 131 MMOL/L (ref 132–146)
SODIUM BLD-SCNC: 133 MMOL/L (ref 132–146)
SODIUM URINE: 125 MMOL/L
TOTAL PROTEIN: 6.5 G/DL (ref 6.4–8.3)
TOTAL PROTEIN: 6.9 G/DL (ref 6.4–8.3)
TSH SERPL DL<=0.05 MIU/L-ACNC: 2.51 UIU/ML (ref 0.27–4.2)
URIC ACID, SERUM: 4.4 MG/DL (ref 3.4–7)
WBC # BLD: 6.4 E9/L (ref 4.5–11.5)

## 2023-01-21 PROCEDURE — 99024 POSTOP FOLLOW-UP VISIT: CPT | Performed by: PSYCHIATRY & NEUROLOGY

## 2023-01-21 PROCEDURE — 6360000002 HC RX W HCPCS

## 2023-01-21 PROCEDURE — 83935 ASSAY OF URINE OSMOLALITY: CPT

## 2023-01-21 PROCEDURE — 6370000000 HC RX 637 (ALT 250 FOR IP): Performed by: PSYCHIATRY & NEUROLOGY

## 2023-01-21 PROCEDURE — 99223 1ST HOSP IP/OBS HIGH 75: CPT | Performed by: SURGERY

## 2023-01-21 PROCEDURE — 2700000000 HC OXYGEN THERAPY PER DAY

## 2023-01-21 PROCEDURE — 84300 ASSAY OF URINE SODIUM: CPT

## 2023-01-21 PROCEDURE — 85025 COMPLETE CBC W/AUTO DIFF WBC: CPT

## 2023-01-21 PROCEDURE — 82533 TOTAL CORTISOL: CPT

## 2023-01-21 PROCEDURE — 92610 EVALUATE SWALLOWING FUNCTION: CPT

## 2023-01-21 PROCEDURE — 80053 COMPREHEN METABOLIC PANEL: CPT

## 2023-01-21 PROCEDURE — 70450 CT HEAD/BRAIN W/O DYE: CPT

## 2023-01-21 PROCEDURE — 84550 ASSAY OF BLOOD/URIC ACID: CPT

## 2023-01-21 PROCEDURE — 92507 TX SP LANG VOICE COMM INDIV: CPT

## 2023-01-21 PROCEDURE — 85730 THROMBOPLASTIN TIME PARTIAL: CPT

## 2023-01-21 PROCEDURE — C9113 INJ PANTOPRAZOLE SODIUM, VIA: HCPCS

## 2023-01-21 PROCEDURE — 82570 ASSAY OF URINE CREATININE: CPT

## 2023-01-21 PROCEDURE — 2580000003 HC RX 258: Performed by: PSYCHIATRY & NEUROLOGY

## 2023-01-21 PROCEDURE — 82436 ASSAY OF URINE CHLORIDE: CPT

## 2023-01-21 PROCEDURE — 2500000003 HC RX 250 WO HCPCS

## 2023-01-21 PROCEDURE — 2000000000 HC ICU R&B

## 2023-01-21 PROCEDURE — 93970 EXTREMITY STUDY: CPT

## 2023-01-21 PROCEDURE — 93306 TTE W/DOPPLER COMPLETE: CPT

## 2023-01-21 PROCEDURE — 84100 ASSAY OF PHOSPHORUS: CPT

## 2023-01-21 PROCEDURE — 2500000003 HC RX 250 WO HCPCS: Performed by: INTERNAL MEDICINE

## 2023-01-21 PROCEDURE — 6360000002 HC RX W HCPCS: Performed by: INTERNAL MEDICINE

## 2023-01-21 PROCEDURE — 2580000003 HC RX 258

## 2023-01-21 PROCEDURE — 99222 1ST HOSP IP/OBS MODERATE 55: CPT | Performed by: INTERNAL MEDICINE

## 2023-01-21 PROCEDURE — 6370000000 HC RX 637 (ALT 250 FOR IP)

## 2023-01-21 PROCEDURE — 84443 ASSAY THYROID STIM HORMONE: CPT

## 2023-01-21 PROCEDURE — 2580000003 HC RX 258: Performed by: INTERNAL MEDICINE

## 2023-01-21 PROCEDURE — 94640 AIRWAY INHALATION TREATMENT: CPT

## 2023-01-21 PROCEDURE — 99291 CRITICAL CARE FIRST HOUR: CPT | Performed by: INTERNAL MEDICINE

## 2023-01-21 PROCEDURE — 84133 ASSAY OF URINE POTASSIUM: CPT

## 2023-01-21 PROCEDURE — 82330 ASSAY OF CALCIUM: CPT

## 2023-01-21 PROCEDURE — 83735 ASSAY OF MAGNESIUM: CPT

## 2023-01-21 PROCEDURE — 37799 UNLISTED PX VASCULAR SURGERY: CPT

## 2023-01-21 RX ORDER — MAGNESIUM SULFATE IN WATER 40 MG/ML
2000 INJECTION, SOLUTION INTRAVENOUS ONCE
Status: COMPLETED | OUTPATIENT
Start: 2023-01-21 | End: 2023-01-21

## 2023-01-21 RX ORDER — LORAZEPAM 2 MG/ML
2 INJECTION INTRAMUSCULAR ONCE
Status: COMPLETED | OUTPATIENT
Start: 2023-01-21 | End: 2023-01-21

## 2023-01-21 RX ORDER — SODIUM CHLORIDE 9 MG/ML
INJECTION, SOLUTION INTRAVENOUS CONTINUOUS
Status: DISCONTINUED | OUTPATIENT
Start: 2023-01-21 | End: 2023-01-21

## 2023-01-21 RX ORDER — LORAZEPAM 2 MG/ML
INJECTION INTRAMUSCULAR
Status: COMPLETED
Start: 2023-01-21 | End: 2023-01-21

## 2023-01-21 RX ORDER — POTASSIUM CHLORIDE 7.45 MG/ML
10 INJECTION INTRAVENOUS
Status: COMPLETED | OUTPATIENT
Start: 2023-01-21 | End: 2023-01-21

## 2023-01-21 RX ORDER — SODIUM CHLORIDE 9 MG/ML
INJECTION, SOLUTION INTRAVENOUS CONTINUOUS
Status: ACTIVE | OUTPATIENT
Start: 2023-01-21 | End: 2023-01-21

## 2023-01-21 RX ADMIN — ARFORMOTEROL TARTRATE 15 MCG: 15 SOLUTION RESPIRATORY (INHALATION) at 08:39

## 2023-01-21 RX ADMIN — Medication 10 MEQ: at 12:27

## 2023-01-21 RX ADMIN — MAGNESIUM SULFATE HEPTAHYDRATE 2000 MG: 40 INJECTION, SOLUTION INTRAVENOUS at 09:22

## 2023-01-21 RX ADMIN — LORAZEPAM 1 MG: 2 INJECTION INTRAMUSCULAR; INTRAVENOUS at 00:00

## 2023-01-21 RX ADMIN — ARFORMOTEROL TARTRATE 15 MCG: 15 SOLUTION RESPIRATORY (INHALATION) at 19:45

## 2023-01-21 RX ADMIN — FOLIC ACID 1 MG: 5 INJECTION, SOLUTION INTRAMUSCULAR; INTRAVENOUS; SUBCUTANEOUS at 09:16

## 2023-01-21 RX ADMIN — THIAMINE HYDROCHLORIDE 100 MG: 100 INJECTION, SOLUTION INTRAMUSCULAR; INTRAVENOUS at 09:18

## 2023-01-21 RX ADMIN — LORAZEPAM 1 MG: 2 INJECTION INTRAMUSCULAR; INTRAVENOUS at 06:12

## 2023-01-21 RX ADMIN — ASPIRIN 300 MG: 300 SUPPOSITORY RECTAL at 09:16

## 2023-01-21 RX ADMIN — ATORVASTATIN CALCIUM 80 MG: 80 TABLET, FILM COATED ORAL at 21:14

## 2023-01-21 RX ADMIN — SODIUM CHLORIDE, PRESERVATIVE FREE 40 MG: 5 INJECTION INTRAVENOUS at 09:17

## 2023-01-21 RX ADMIN — SODIUM CHLORIDE: 9 INJECTION, SOLUTION INTRAVENOUS at 09:13

## 2023-01-21 RX ADMIN — BUDESONIDE 500 MCG: 0.5 SUSPENSION RESPIRATORY (INHALATION) at 08:39

## 2023-01-21 RX ADMIN — Medication 10 MEQ: at 09:00

## 2023-01-21 RX ADMIN — Medication 10 MEQ: at 11:23

## 2023-01-21 RX ADMIN — TICAGRELOR 90 MG: 90 TABLET ORAL at 21:14

## 2023-01-21 RX ADMIN — Medication 1 TABLET: at 09:16

## 2023-01-21 RX ADMIN — TICAGRELOR 90 MG: 90 TABLET ORAL at 09:16

## 2023-01-21 RX ADMIN — BUDESONIDE 500 MCG: 0.5 SUSPENSION RESPIRATORY (INHALATION) at 19:45

## 2023-01-21 RX ADMIN — Medication 10 MEQ: at 08:00

## 2023-01-21 RX ADMIN — DEXMEDETOMIDINE 0.2 MCG/KG/HR: 100 INJECTION, SOLUTION, CONCENTRATE INTRAVENOUS at 06:50

## 2023-01-21 RX ADMIN — Medication 10 ML: at 09:33

## 2023-01-21 RX ADMIN — LORAZEPAM 2 MG: 2 INJECTION INTRAMUSCULAR; INTRAVENOUS at 17:01

## 2023-01-21 RX ADMIN — SODIUM CHLORIDE: 9 INJECTION, SOLUTION INTRAVENOUS at 01:56

## 2023-01-21 RX ADMIN — SODIUM CHLORIDE, PRESERVATIVE FREE 10 ML: 5 INJECTION INTRAVENOUS at 09:19

## 2023-01-21 ASSESSMENT — PAIN SCALES - GENERAL
PAINLEVEL_OUTOF10: 0

## 2023-01-21 NOTE — PROGRESS NOTES
Patient reaches for lines tubing and equipment and unable to be redirected. Starting soft bilateral wrist restraints.

## 2023-01-21 NOTE — PROGRESS NOTES
SPEECH LANGUAGE PATHOLOGY  DAILY PROGRESS NOTE        PATIENT NAME:  Jovani Edwards      :  1955          TODAY'S DATE:  2023 ROOM:  [facility]    Patient was seen for ongoing speech and language therapy this date. Patient demonstrated a left visual neglect when asked to read instructions. Patient was able to follow 1-2 step directions appropriately this date. Patient was alert and oriented to self and place. Patient recalled family that was present in room at the start of the speech therapy session. Overall, patient demonstrates decreased insight to deficits, as he continually asked therapist to get dressed because he needed to get home.        CPT code(s) I6436807  speech/language tx  Total minutes :  15 minutes

## 2023-01-21 NOTE — PLAN OF CARE
Problem: Discharge Planning  Goal: Discharge to home or other facility with appropriate resources  1/20/2023 2023 by Nena Miranda RN  Outcome: Progressing  Flowsheets (Taken 1/20/2023 0800 by Felix Huizar RN)  Discharge to home or other facility with appropriate resources: Identify barriers to discharge with patient and caregiver  1/20/2023 0713 by Felix Huizar RN  Outcome: Progressing     Problem: Chronic Conditions and Co-morbidities  Goal: Patient's chronic conditions and co-morbidity symptoms are monitored and maintained or improved  1/20/2023 2023 by Nena Miranda RN  Outcome: Progressing  Flowsheets (Taken 1/20/2023 0800 by Felix Huizar RN)  Care Plan - Patient's Chronic Conditions and Co-Morbidity Symptoms are Monitored and Maintained or Improved:   Monitor and assess patient's chronic conditions and comorbid symptoms for stability, deterioration, or improvement   Collaborate with multidisciplinary team to address chronic and comorbid conditions and prevent exacerbation or deterioration  1/20/2023 0713 by Felix Huizar RN  Outcome: Progressing     Problem: ABCDS Injury Assessment  Goal: Absence of physical injury  1/20/2023 2023 by Nena Miranda RN  Outcome: Progressing  1/20/2023 0713 by Felix Huizar RN  Outcome: Progressing     Problem: Safety - Adult  Goal: Free from fall injury  1/20/2023 2023 by Nena Miranda RN  Outcome: Progressing  1/20/2023 0713 by Felix Huizar RN  Outcome: Progressing     Problem: Pain  Goal: Verbalizes/displays adequate comfort level or baseline comfort level  Outcome: Progressing

## 2023-01-21 NOTE — PROGRESS NOTES
[address] [place] [address]  Department of Pulmonary, Critical Care and Sleep Medicine  Valorie Sierra, Dr. Yumi Campo, Dr. Mitchell Aguilar:    Patient seen and examined at bedside. S/P Left vertebral artery thrombectomy, left vertebral artery complete reconstruction with 5 stents. Currently he is quite agitated. Upset that he was woken up for a neuro exam. Will not answer questions at this time. OBJECTIVE:  Vitals:    01/21/23 1200 01/21/23 1230 01/21/23 1300 01/21/23 1330   BP:       Pulse: 85 80 86 81   Resp: 15 15 15 17   Temp: 98.3 °F (36.8 °C)      TempSrc:       SpO2: 97% 97% 98% 98%   Weight:       Height:           1. General: Alert and oriented to person and place not time. Combative   2. Eyes: Vision - grossly normal, PERRLA   3. HENT: Head is atraumatic & normocephalic. Neck Supple, No jugular venous distention   4. Respiratory: Lungs are clear to auscultation, Respirations are non-labored, Breath sounds are equal   5. Cardiovascular: S1, S2 normal, Regular rate & rhythm, No murmur, No pedal edema   6. Gastrointestinal: Soft, Non-tender, Non-distended, Normal bowel sounds. No organomegaly. 7. Neurologic: Exam limited due to agitation. Moves alls extremities. Face appears symmetric. 8. Skin: no rashes, breakdown  9. Musculoskeletal: no LE edema, no joint effusion.   10. Psychiatric - Agitated and combative    DATA:    CBC:   Lab Results   Component Value Date    WBC 6.4 01/21/2023    HGB 11.8 (L) 01/21/2023    HCT 33.6 (L) 01/21/2023    MCV 86.8 01/21/2023     01/21/2023     LFTs:   Lab Results   Component Value Date    ALT 13 01/21/2023    AST 16 01/21/2023    ALKPHOS 110 01/21/2023    BILITOT 0.5 01/21/2023    PROT 6.9 01/21/2023     Coags: No results found for: INR, PTT    RADIOLOGY:      CT HEAD WO CONTRAST    Result Date: 1/20/2023  EXAMINATION: CT OF THE HEAD WITHOUT CONTRAST 1/20/2023 12:42 am TECHNIQUE: CT of the head was performed without the administration of intravenous contrast. Automated exposure control, iterative reconstruction, and/or weight based adjustment of the mA/kV was utilized to reduce the radiation dose to as low as reasonably achievable. COMPARISON: None. HISTORY: ORDERING SYSTEM PROVIDED HISTORY: follow up ischemic stroke, AMS TECHNOLOGIST PROVIDED HISTORY: Reason for exam:->follow up ischemic stroke, AMS Has a \"code stroke\" or \"stroke alert\" been called? ->Yes What reading provider will be dictating this exam?->CRC FINDINGS: BRAIN/VENTRICLES: There is abnormal amorphous low attenuation obscuring architecture of the left cerebellar hemisphere. The features have high association with acute infarction associated with the posterior inferior cerebellar artery. There is cerebral atrophy with associated ex vacuo dilatation of the ventricular system. There is mild ill-defined low-attenuation in the periventricular white matter, corona radiata, and centrum semiovale bilaterally which has association with age related microvascular white matter ischemic disease. There is mild atherosclerotic plaque in the bilateral carotid canal, carotid siphon, and cavernous internal carotid artery (<50%). There is no acute intracranial hemorrhage, mass effect, or midline shift. No abnormal extra-axial fluid collection. There is no evidence of hydrocephalus. ORBITS: The visualized portion of the orbits demonstrate no acute abnormality. SINUSES: There is mild polypoid perimucosal thickening in the right sphenoid sinus and posterior left-sided ethmoid air cells. The visualized mastoid air cells demonstrate no acute abnormality. SOFT TISSUES/SKULL:  No acute abnormality of the visualized skull or soft tissues. Subacute infarction localized in the inferior left cerebellar hemisphere in the PICA distribution. No hemorrhage.  Chronic parenchymal change including atrophy and old white matter ischemia. IR TRANSCATH PLACE STENT UPPER W PTA INIT ARTERY    Result Date: 1/21/2023  IR GUIDED PERC TRANSLUM NONCOR ART THROMBECTOMY INIT, IR INTRACRANIAL STENT(S), IR TRANSCATH PLACE STENT UPPER W PTA INIT ARTERY PROCEDURE PERFORMED: 1. Cerebral angiogram with mechanical thrombectomy of the left extracranial and intracranial portions of the vertebral artery 2. Stent assisted complete reconstruction of the Left  V1 and V2 portions of the extracranial vertebral artery WITH distal embolic protection 3. Recurrent reocclusion of the intracranial vertebral artery status post repeat thrombectomy 4. Artery to artery emboli into the left posterior cerebral artery and the right posterior cerebral artery status post aspiration thrombectomy of the right PCA and the left PCA 5. Successful intracranial stenting ( MULTILINK VISION) of the V4 portion of the left vertebral artery to prevent recurrent occlusion 6. Intraoperative Ame CT Limited CT with interpretation COMPLETED DATE:  1/20/2023 5:00 PM CLINICAL HISTORY/PRE-PROCEDURE DIAGNOSIS: Acute ischemic stroke. Onset of stroke was on January 17. Patient has having recurrent posterior's circulation stroke despite maximal medical therapy. Patient was then transferred from outside hospital to here. Medical therapy maximized with heparin drip. Continues to have more cranial nerve deficits. Procedure was offered on a humanitarian basis as he is still having strokes on heparin drip POST-PROCEDURE DIAGNOSIS: Successful mechanical thrombectomy successful stent assisted reconstruction/recanalization of the left vertebral artery connecting it to the basilar artery/restoration of flow COMPARISON: CT angiographic documentation done at outside hospital images were transferred into PACS. Data imaging 1/19/2023 MRI 1/20/2023 ANESTHESIA: Conscious sedation and local anesthesia VESSELS CATHETERIZED: 1. Left vertebral artery. 2. Basilar artery. 3. Left posterior cerebral artery.  4 right posterior cerebral artery. 6. Left subclavian artery. RADIATION EXPOSURE DATA:  1177 MG Y TOTAL FLUOROSCOPY TIME: 29 minutes and 36 seconds CONTRAST USED: 125 mL of Isovue-300 PROCEDURE: Following informed consent, the patient was brought to the angiography suite, both groins are prepped and draped in usual sterile manner. Vascular access was obtained in the right common femoral artery with a 8-Maltese sheath which was connected to continuous heparinized saline flush. A 8-Maltese cerebase catheter was prepped and with the support of a diagnostic catheter was brought into the aorta, double flushed and connected to continuous heparinized saline flush. The  guide catheter was then telescoped into the left subclavian artery. Fluoroscopic imaging performed at that time confirmed the presence of previously documented arterial occlusion. Multilevel partial thrombosis all throughout the left vertebral artery severe stenosis at the origin of the left vertebral artery/vertebral subclavian junction. Following this aspiration was initially performed through the cerebral base catheter through the Zoom suction device and then gradually a Zoom 71 was taken up over a glide advantage wire exchange length and continuous aspiration was performed wall pushing the aspiration catheter forward into the left vertebral artery. Large amount of thrombus was identified. Following this imaging was performed which showed intracranial V4 occlusion of the left vertebral artery and hence using Zoom 35 system through the Zoom 71 system serial aspiration was performed successful thrombectomy was performed until the midportion of the basilar artery. No distal embolization was noticed. On repeat imaging through the proximal vertebral artery there was really occlusion noticed in the V2 segment and hence it was decided to proceed with the stent assisted reconstruction of the entire vessel.  A 5 mm spider device was then deployed into the V3 segment of the vertebral artery Patient was started on Integrilin. Initially 1 carotid Wallstent was placed in the distal portionv2 and there is was overlap with the second portion. Again in the V1 portion and initial carotid stent was placed and in the subclavian vertebral junction there is severe stenosis and hence of fourth carotid wall stent was also placed here. After a total of 4 stents were deployed in the V1 and V2 segments angiographic documentation was repeated which showed distal occlusion in the V4 segment and hence a repeat thrombectomy was performed in the V4 segment. This resulted in artery to artery embolization into bilateral posterior cerebral arteries and the basilar tip. Aspiration thrombectomy was performed initially in the left posterior cerebral artery and was successful. Aspiration thrombectomy was then repeated in the right posterior cerebral artery multiple attempts. Following this it was decided to stent the V4 segment that keeps reoccluding and hence balloon mounted stent  3.5mm x 18 mm Multilink vision was taken over and deployed over a 014 wire that was taken into the left vertebral artery and internal taken down into the right vertebral artery for appropriate support to deployed without a intermediate catheter. The balloon mounted stent was successfully deployed at 8 claude. Angiographic documentation repeated with contrast reveals good flow throughout the left vertebral artery this was repeated again at 3 minute interval and was persistently open. Intraoperative Ame CT was obtained which was negative for any stephanie obvious intracranial hemorrhage. Patient will be maintained on Integrilin drip. Anesthesia was reversed and patient was transferred to the postoperative recovery unit in a stable condition. STROKE ACUTE TIME STAMPS: Please see nursing documentation epic INTERPRETATION OF IMAGES: 1. Left  vertebral artery injections:  The common and internal carotid arteries had partial thrombosis of the V1 and V2 segment. Complete thrombosis of the V3 segment complete thrombosis of the V4 segment with no flow contrast leading to the basilar artery. 2. Angiographic documentation post carotid Wallstent 6 mm x 22 mm deployment: Stent 1. Was deployed in the distal V2 portion of the vertebral artery post deployment no evidence of in-stent stenosis or thrombosis. 3. Angiographic documentation post carotid Wallstent 6 mm x 22 mm deployment: Stent was deployed in the mid V2 portion angiogram reveals patency of the stent 4. Angiographic documentation post carotid Wallstent 6 mm x 22 mm deployment: Stent was deployed in the proximal V2 to V1 portion. Repeat angiogram reveals that the first and second stent have occluded. 5.Angiographic documentation post carotid Wallstent 6 mm x 22 mm deployment: Stent was deployed from the V4 segment to the subclavian artery. Persistent reocclusion. 6. Angiogram post thrombectomy of the V1 V2 V3 segment successful recanalization however the distal V4 portion seems to have reoccluded. 7. Angiogram post thrombectomy of the intracranial vertebral artery: There appears to be distal embolization into the PCA. 8. Angiogram post thrombectomy of bilateral posterior cerebral arteries: Post thrombectomy images reveal satisfactory recanalization of bilateral posterior cerebral arteries 9. Angiogram post intracranial stenting with Multi-Link stent: Multi-Link stent is placed in the V4 segment no evidence of any in-stent stenosis or thrombosis. 10.Postprocedure left vertebral artery artery injections: Postprocedure all the stents are patent. Restoration of flow to the posterior circulation. No evidence of restenosis artery thrombosis. TICI 3 Intraoperative Ame CT Limited CT with interpretation: Intraoperative Ame CT Limited CT with interpretation was performed using 3-D rotational technique. Axial coronal and sagittal images were reconstructed.  There were reviewed and did not show any evidence of intracranial hemorrhage. IMPRESSION 1. 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 Partial from doses of the V1 and V2 segments suspicious for vessel trauma. 2. Successful thrombectomy followed by reocclusion of the left vertebral artery both extracranial and intracranial segments. 3. Complete stent assisted reconstruction of the extracranial left vertebral artery with distal embolic protection. 4. Left posterior cerebral artery and right posterior cerebral artery thrombectomy as a result from artery to artery emboli 5. Successful rescue mechanical intracranial stenting of the V4 segment to prevent reocclusion Patients: If you have questions regarding some of the verbiage in your report, please visit [email or web address] for a definition. If you have any other questions, please contact your physician. IR INTRACRANIAL STENT(S)    Result Date: 1/21/2023  IR GUIDED PERC TRANSLUM NONCOR ART THROMBECTOMY INIT, IR INTRACRANIAL STENT(S), IR TRANSCATH PLACE STENT UPPER W PTA INIT ARTERY PROCEDURE PERFORMED: 1. Cerebral angiogram with mechanical thrombectomy of the left extracranial and intracranial portions of the vertebral artery 2. Stent assisted complete reconstruction of the Left  V1 and V2 portions of the extracranial vertebral artery WITH distal embolic protection 3. Recurrent reocclusion of the intracranial vertebral artery status post repeat thrombectomy 4. Artery to artery emboli into the left posterior cerebral artery and the right posterior cerebral artery status post aspiration thrombectomy of the right PCA and the left PCA 5. Successful intracranial stenting ( MULTILINK VISION) of the V4 portion of the left vertebral artery to prevent recurrent occlusion 6. Intraoperative Ame CT Limited CT with interpretation COMPLETED DATE:  1/20/2023 5:00 PM CLINICAL HISTORY/PRE-PROCEDURE DIAGNOSIS: Acute ischemic stroke. Onset of stroke was on January 17.  Patient has having recurrent posterior's circulation stroke despite maximal medical therapy. Patient was then transferred from outside hospital to here. Medical therapy maximized with heparin drip. Continues to have more cranial nerve deficits. Procedure was offered on a humanitarian basis as he is still having strokes on heparin drip POST-PROCEDURE DIAGNOSIS: Successful mechanical thrombectomy successful stent assisted reconstruction/recanalization of the left vertebral artery connecting it to the basilar artery/restoration of flow COMPARISON: CT angiographic documentation done at outside hospital images were transferred into PACS. Data imaging 1/19/2023 MRI 1/20/2023 ANESTHESIA: Conscious sedation and local anesthesia VESSELS CATHETERIZED: 1. Left vertebral artery. 2. Basilar artery. 3. Left posterior cerebral artery. 4 right posterior cerebral artery. 6. Left subclavian artery. RADIATION EXPOSURE DATA:  1177 MG Y TOTAL FLUOROSCOPY TIME: 29 minutes and 36 seconds CONTRAST USED: 125 mL of Isovue-300 PROCEDURE: Following informed consent, the patient was brought to the angiography suite, both groins are prepped and draped in usual sterile manner. Vascular access was obtained in the right common femoral artery with a 8-Algerian sheath which was connected to continuous heparinized saline flush. A 8-Algerian cerebase catheter was prepped and with the support of a diagnostic catheter was brought into the aorta, double flushed and connected to continuous heparinized saline flush. The  guide catheter was then telescoped into the left subclavian artery. Fluoroscopic imaging performed at that time confirmed the presence of previously documented arterial occlusion. Multilevel partial thrombosis all throughout the left vertebral artery severe stenosis at the origin of the left vertebral artery/vertebral subclavian junction.  Following this aspiration was initially performed through the cerebral base catheter through the Zoom suction device and then gradually a Zoom 71 was taken up over a glide advantage wire exchange length and continuous aspiration was performed wall pushing the aspiration catheter forward into the left vertebral artery. Large amount of thrombus was identified. Following this imaging was performed which showed intracranial V4 occlusion of the left vertebral artery and hence using Zoom 35 system through the Zoom 71 system serial aspiration was performed successful thrombectomy was performed until the midportion of the basilar artery. No distal embolization was noticed. On repeat imaging through the proximal vertebral artery there was really occlusion noticed in the V2 segment and hence it was decided to proceed with the stent assisted reconstruction of the entire vessel. A 5 mm spider device was then deployed into the V3 segment of the vertebral artery Patient was started on Integrilin. Initially 1 carotid Wallstent was placed in the distal portionv2 and there is was overlap with the second portion. Again in the V1 portion and initial carotid stent was placed and in the subclavian vertebral junction there is severe stenosis and hence of fourth carotid wall stent was also placed here. After a total of 4 stents were deployed in the V1 and V2 segments angiographic documentation was repeated which showed distal occlusion in the V4 segment and hence a repeat thrombectomy was performed in the V4 segment. This resulted in artery to artery embolization into bilateral posterior cerebral arteries and the basilar tip. Aspiration thrombectomy was performed initially in the left posterior cerebral artery and was successful. Aspiration thrombectomy was then repeated in the right posterior cerebral artery multiple attempts.  Following this it was decided to stent the V4 segment that keeps reoccluding and hence balloon mounted stent  3.5mm x 18 mm Multilink vision was taken over and deployed over a 014 wire that was taken into the left vertebral artery and internal taken down into the right vertebral artery for appropriate support to deployed without a intermediate catheter. The balloon mounted stent was successfully deployed at 8 claude. Angiographic documentation repeated with contrast reveals good flow throughout the left vertebral artery this was repeated again at 3 minute interval and was persistently open. Intraoperative Ame CT was obtained which was negative for any stephanie obvious intracranial hemorrhage. Patient will be maintained on Integrilin drip. Anesthesia was reversed and patient was transferred to the postoperative recovery unit in a stable condition. STROKE ACUTE TIME STAMPS: Please see nursing documentation epic INTERPRETATION OF IMAGES: 1. Left  vertebral artery injections: The common and internal carotid arteries had partial thrombosis of the V1 and V2 segment. Complete thrombosis of the V3 segment complete thrombosis of the V4 segment with no flow contrast leading to the basilar artery. 2. Angiographic documentation post carotid Wallstent 6 mm x 22 mm deployment: Stent 1. Was deployed in the distal V2 portion of the vertebral artery post deployment no evidence of in-stent stenosis or thrombosis. 3. Angiographic documentation post carotid Wallstent 6 mm x 22 mm deployment: Stent was deployed in the mid V2 portion angiogram reveals patency of the stent 4. Angiographic documentation post carotid Wallstent 6 mm x 22 mm deployment: Stent was deployed in the proximal V2 to V1 portion. Repeat angiogram reveals that the first and second stent have occluded. 5.Angiographic documentation post carotid Wallstent 6 mm x 22 mm deployment: Stent was deployed from the V4 segment to the subclavian artery. Persistent reocclusion. 6. Angiogram post thrombectomy of the V1 V2 V3 segment successful recanalization however the distal V4 portion seems to have reoccluded. 7. Angiogram post thrombectomy of the intracranial vertebral artery: There appears to be distal embolization into the PCA.  8. Angiogram post thrombectomy of bilateral posterior cerebral arteries: Post thrombectomy images reveal satisfactory recanalization of bilateral posterior cerebral arteries 9. Angiogram post intracranial stenting with Multi-Link stent: Multi-Link stent is placed in the V4 segment no evidence of any in-stent stenosis or thrombosis. 10.Postprocedure left vertebral artery artery injections: Postprocedure all the stents are patent. Restoration of flow to the posterior circulation. No evidence of restenosis artery thrombosis. TICI 3 Intraoperative Ame CT Limited CT with interpretation: Intraoperative Ame CT Limited CT with interpretation was performed using 3-D rotational technique. Axial coronal and sagittal images were reconstructed. There were reviewed and did not show any evidence of intracranial hemorrhage. IMPRESSION 1. 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 Partial from doses of the V1 and V2 segments suspicious for vessel trauma. 2. Successful thrombectomy followed by reocclusion of the left vertebral artery both extracranial and intracranial segments. 3. Complete stent assisted reconstruction of the extracranial left vertebral artery with distal embolic protection. 4. Left posterior cerebral artery and right posterior cerebral artery thrombectomy as a result from artery to artery emboli 5. Successful rescue mechanical intracranial stenting of the V4 segment to prevent reocclusion Patients: If you have questions regarding some of the verbiage in your report, please visit [email or web address] for a definition. If you have any other questions, please contact your physician.      MRI BRAIN WO CONTRAST    Result Date: 1/20/2023  EXAMINATION: MRI OF THE BRAIN WITHOUT CONTRAST  1/20/2023 11:26 am TECHNIQUE: Multiplanar multisequence MRI of the brain was performed without the administration of intravenous contrast. COMPARISON: Head CT dated 01/20/2023 and outside MRI brain dated 01/19/2023 HISTORY: ORDERING SYSTEM PROVIDED HISTORY: stroke protocol TECHNOLOGIST PROVIDED HISTORY: Reason for exam:->stroke protocol What is the sedation requirement?->None What reading provider will be dictating this exam?->CRC FINDINGS: INTRACRANIAL STRUCTURES/VENTRICLES: There is a large area of restricted diffusion in the inferior left cerebellum, as well as the left dorsal medulla, consistent with acute/subacute infarct, primarily in the left posteroinferior cerebellar artery territory. Small area of restricted diffusion again seen in the right cerebellum on series 2, image 11, consistent with acute/subacute infarct. No new area of restricted diffusion is seen. There is associated cytotoxic edema in the left inferior cerebellum with slight effacement of the overlying cortical sulci. No other area of mass effect or midline shift. Tiny focus of low signal is seen in the right ezekiel on gradient echo series 5, image 11. This was present on the prior study from 01/19/2023. It is of indeterminate age but could represent old petechial hemorrhage. Otherwise, no evidence of an acute intracranial hemorrhage. The ventricles and sulci are normal in size and configuration. The sellar/suprasellar regions appear unremarkable. The normal signal voids within the major intracranial vessels appear maintained. ORBITS: The visualized portion of the orbits demonstrate no acute abnormality. SINUSES: The visualized paranasal sinuses and mastoid air cells demonstrate no acute abnormality. There is again severe mucosal thickening in the right sphenoid sinus which is almost completely opacified and moderate mucosal thickening in the left ethmoid sinus as well as mild thickening in other sinuses. BONES/SOFT TISSUES: The bone marrow signal intensity appears normal. The soft tissues demonstrate no acute abnormality.      1. Bilateral acute/subacute infarcts including left PICA infarct and small lacunar infarct in the right cerebellum, similar compared to the prior study. 2. Probable area of old petechial hemorrhage in the right lateral ezekiel. 3. Otherwise, no new acute intracranial abnormality seen. 4. Chronic sinus disease. IR GUIDED PERC TRANSLUM ART THROMBECTOMY INIT    Result Date: 1/21/2023  IR GUIDED PERC TRANSLUM NONCOR ART THROMBECTOMY INIT, IR INTRACRANIAL STENT(S), IR TRANSCATH PLACE STENT UPPER W PTA INIT ARTERY PROCEDURE PERFORMED: 1. Cerebral angiogram with mechanical thrombectomy of the left extracranial and intracranial portions of the vertebral artery 2. Stent assisted complete reconstruction of the Left  V1 and V2 portions of the extracranial vertebral artery WITH distal embolic protection 3. Recurrent reocclusion of the intracranial vertebral artery status post repeat thrombectomy 4. Artery to artery emboli into the left posterior cerebral artery and the right posterior cerebral artery status post aspiration thrombectomy of the right PCA and the left PCA 5. Successful intracranial stenting ( [place] VISION) of the V4 portion of the left vertebral artery to prevent recurrent occlusion 6. Intraoperative Ame CT Limited CT with interpretation COMPLETED DATE:  1/20/2023 5:00 PM CLINICAL HISTORY/PRE-PROCEDURE DIAGNOSIS: Acute ischemic stroke. Onset of stroke was on January 17. Patient has having recurrent posterior's circulation stroke despite maximal medical therapy. Patient was then transferred from outside hospital to here. Medical therapy maximized with heparin drip. Continues to have more cranial nerve deficits. Procedure was offered on a humanitarian basis as he is still having strokes on heparin drip POST-PROCEDURE DIAGNOSIS: Successful mechanical thrombectomy successful stent assisted reconstruction/recanalization of the left vertebral artery connecting it to the basilar artery/restoration of flow COMPARISON: CT angiographic documentation done at outside hospital images were transferred into PACS.  Data imaging 1/19/2023 MRI 1/20/2023 ANESTHESIA: Conscious sedation and local anesthesia VESSELS CATHETERIZED: 1. Left vertebral artery. 2. Basilar artery. 3. Left posterior cerebral artery. 4 right posterior cerebral artery. 6. Left subclavian artery. RADIATION EXPOSURE DATA:  1177 MG Y TOTAL FLUOROSCOPY TIME: 29 minutes and 36 seconds CONTRAST USED: 125 mL of Isovue-300 PROCEDURE: Following informed consent, the patient was brought to the angiography suite, both groins are prepped and draped in usual sterile manner. Vascular access was obtained in the right common femoral artery with a 8-Mosotho sheath which was connected to continuous heparinized saline flush. A 8-Mosotho cerebase catheter was prepped and with the support of a diagnostic catheter was brought into the aorta, double flushed and connected to continuous heparinized saline flush. The  guide catheter was then telescoped into the left subclavian artery. Fluoroscopic imaging performed at that time confirmed the presence of previously documented arterial occlusion. Multilevel partial thrombosis all throughout the left vertebral artery severe stenosis at the origin of the left vertebral artery/vertebral subclavian junction. Following this aspiration was initially performed through the cerebral base catheter through the Zoom suction device and then gradually a Zoom 71 was taken up over a glide advantage wire exchange length and continuous aspiration was performed wall pushing the aspiration catheter forward into the left vertebral artery. Large amount of thrombus was identified. Following this imaging was performed which showed intracranial V4 occlusion of the left vertebral artery and hence using Zoom 35 system through the Zoom 71 system serial aspiration was performed successful thrombectomy was performed until the midportion of the basilar artery. No distal embolization was noticed.  On repeat imaging through the proximal vertebral artery there was really occlusion noticed in the V2 segment and hence it was decided to proceed with the stent assisted reconstruction of the entire vessel. A 5 mm spider device was then deployed into the V3 segment of the vertebral artery Patient was started on Integrilin. Initially 1 carotid Wallstent was placed in the distal portionv2 and there is was overlap with the second portion. Again in the V1 portion and initial carotid stent was placed and in the subclavian vertebral junction there is severe stenosis and hence of fourth carotid wall stent was also placed here. After a total of 4 stents were deployed in the V1 and V2 segments angiographic documentation was repeated which showed distal occlusion in the V4 segment and hence a repeat thrombectomy was performed in the V4 segment. This resulted in artery to artery embolization into bilateral posterior cerebral arteries and the basilar tip. Aspiration thrombectomy was performed initially in the left posterior cerebral artery and was successful. Aspiration thrombectomy was then repeated in the right posterior cerebral artery multiple attempts. Following this it was decided to stent the V4 segment that keeps reoccluding and hence balloon mounted stent  3.5mm x 18 mm Multilink vision was taken over and deployed over a 014 wire that was taken into the left vertebral artery and internal taken down into the right vertebral artery for appropriate support to deployed without a intermediate catheter. The balloon mounted stent was successfully deployed at 8 claude. Angiographic documentation repeated with contrast reveals good flow throughout the left vertebral artery this was repeated again at 3 minute interval and was persistently open. Intraoperative Ame CT was obtained which was negative for any stephanie obvious intracranial hemorrhage. Patient will be maintained on Integrilin drip. Anesthesia was reversed and patient was transferred to the postoperative recovery unit in a stable condition.  STROKE ACUTE TIME STAMPS: Please see nursing documentation epic INTERPRETATION OF IMAGES: 1. Left  vertebral artery injections: The common and internal carotid arteries had partial thrombosis of the V1 and V2 segment. Complete thrombosis of the V3 segment complete thrombosis of the V4 segment with no flow contrast leading to the basilar artery. 2. Angiographic documentation post carotid Wallstent 6 mm x 22 mm deployment: Stent 1. Was deployed in the distal V2 portion of the vertebral artery post deployment no evidence of in-stent stenosis or thrombosis. 3. Angiographic documentation post carotid Wallstent 6 mm x 22 mm deployment: Stent was deployed in the mid V2 portion angiogram reveals patency of the stent 4. Angiographic documentation post carotid Wallstent 6 mm x 22 mm deployment: Stent was deployed in the proximal V2 to V1 portion. Repeat angiogram reveals that the first and second stent have occluded. 5.Angiographic documentation post carotid Wallstent 6 mm x 22 mm deployment: Stent was deployed from the V4 segment to the subclavian artery. Persistent reocclusion. 6. Angiogram post thrombectomy of the V1 V2 V3 segment successful recanalization however the distal V4 portion seems to have reoccluded. 7. Angiogram post thrombectomy of the intracranial vertebral artery: There appears to be distal embolization into the PCA. 8. Angiogram post thrombectomy of bilateral posterior cerebral arteries: Post thrombectomy images reveal satisfactory recanalization of bilateral posterior cerebral arteries 9. Angiogram post intracranial stenting with Multi-Link stent: Multi-Link stent is placed in the V4 segment no evidence of any in-stent stenosis or thrombosis. 10.Postprocedure left vertebral artery artery injections: Postprocedure all the stents are patent. Restoration of flow to the posterior circulation. No evidence of restenosis artery thrombosis. TICI 3 Intraoperative Ame CT Limited CT with interpretation: Intraoperative Ame CT Limited CT with interpretation was performed using 3-D rotational technique. Axial coronal and sagittal images were reconstructed. There were reviewed and did not show any evidence of intracranial hemorrhage. IMPRESSION 1. 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 Partial from doses of the V1 and V2 segments suspicious for vessel trauma. 2. Successful thrombectomy followed by reocclusion of the left vertebral artery both extracranial and intracranial segments. 3. Complete stent assisted reconstruction of the extracranial left vertebral artery with distal embolic protection. 4. Left posterior cerebral artery and right posterior cerebral artery thrombectomy as a result from artery to artery emboli 5. Successful rescue mechanical intracranial stenting of the V4 segment to prevent reocclusion Patients: If you have questions regarding some of the verbiage in your report, please visit [email or web address] for a definition. If you have any other questions, please contact your physician.        CBC with Differential:    Lab Results   Component Value Date/Time    WBC 6.4 01/21/2023 04:03 AM    RBC 3.87 01/21/2023 04:03 AM    HGB 11.8 01/21/2023 04:03 AM    HCT 33.6 01/21/2023 04:03 AM     01/21/2023 04:03 AM    MCV 86.8 01/21/2023 04:03 AM    MCH 30.5 01/21/2023 04:03 AM    MCHC 35.1 01/21/2023 04:03 AM    RDW 13.2 01/21/2023 04:03 AM    LYMPHOPCT 9.3 01/21/2023 04:03 AM    MONOPCT 4.0 01/21/2023 04:03 AM    BASOPCT 0.2 01/21/2023 04:03 AM    MONOSABS 0.26 01/21/2023 04:03 AM    LYMPHSABS 0.60 01/21/2023 04:03 AM    EOSABS 0.00 01/21/2023 04:03 AM    BASOSABS 0.01 01/21/2023 04:03 AM     CMP:    Lab Results   Component Value Date/Time     01/21/2023 09:31 AM    K 3.9 01/21/2023 09:31 AM    K 3.8 01/20/2023 04:25 AM    CL 99 01/21/2023 09:31 AM    CO2 19 01/21/2023 09:31 AM    BUN 12 01/21/2023 09:31 AM    CREATININE 0.6 01/21/2023 09:31 AM    LABGLOM >60 01/21/2023 09:31 AM    GLUCOSE 108 01/21/2023 09:31 AM    PROT 6.9 01/21/2023 09:31 AM    LABALBU 4.1 01/21/2023 09:31 AM    CALCIUM 8.9 01/21/2023 09:31 AM    BILITOT 0.5 01/21/2023 09:31 AM    ALKPHOS 110 01/21/2023 09:31 AM    AST 16 01/21/2023 09:31 AM    ALT 13 01/21/2023 09:31 AM       CLINICAL ASSESMENT & PLAN:  Acute CVA left cerebellar stroke  S/P Left vertebral artery thrombectomy, left vertebral artery complete reconstruction with 5 stents. Left vertebral artery dissection  Vertical diplopia  Etoh abuse  COPD  Bradycardia  Tobacco abuse  Delirium Tremens       Continue neuro checks. Repeat CT head at 7 pm tonight. Echo shows no shunt. Ultrasound of lower extremity shows no DVT  Maintain SBP <160mmHg  Continue Integrilin for 24 hours, continue brilinta  Continue atorvastatin  Continue CIWA protocol, thiamine. On precedex gtt  Continue brovana, pulmicort, duonebs  GI prophylaxis, protonix    Nutrition: Diet    PPX: protonix    Lines: Gisela on left, peripherals    Case was discussed with care team.    This patient is unstable and critically ill. There are life and organ supporting interventions that require frequent monitoring and personal assessment. There is a high possibility of sudden, clinically significant or life-threatening deterioration in this patient's condition which may require prompt therapeutic interventions. I spent 45 independent minutes of critical care time excluding procedures.       Henry Gordon DO

## 2023-01-21 NOTE — PROGRESS NOTES
SPEECH/LANGUAGE PATHOLOGY  CLINICAL ASSESSMENT OF SWALLOWING FUNCTION   and PLAN OF CARE    PATIENT NAME:  David Cole  (male)     MRN:  [id number]    :  1955  (78 y.o.)  STATUS:  Inpatient: Room 4410/4410-A    TODAY'S DATE:  2023  REFERRING PROVIDER:   Dr. Taye Arevalo MD  SPECIFIC PROVIDER ORDER:  Assess for aphasia/dysphagia/severe dysarthria/brainstem stroke  Start:  23,   End:  23,   ONE TIME,   Standing Count:  1 Occurrences,   R       Comments:  *If present, contact speech fo. .. Taye Arevalo MD    EVALUATING THERAPIST: Reji Kerr, CHAPARRITA                 RESULTS:    DYSPHAGIA DIAGNOSIS:   Clinical indicators of moderate oropharyngeal phase dysphagia       DIET RECOMMENDATIONS:  Pureed consistency solids (IDDSI level 4) with  honey consistency (moderately thick - IDDSI level 3)  liquids  Patient recommended to complete MBSS if/when deemed appropriate to go to x-ray given behavioral issues. FEEDING RECOMMENDATIONS:     Assistance level:  Full assistance is needed during all oral intake      Compensatory strategies recommended: Small bites/sips, Alternate solids and liquids, and No straw      Discussed recommendations with nursing and/or faxed report to referring provider: Yes    SPEECH THERAPY  PLAN OF CARE   The dysphagia POC is established based on physician order, dysphagia diagnosis and results of clinical assessment     Skilled SLP intervention for dysphagia management up to 5x per week until goals met, pt plateaus in function and/or discharged from hospital    Conditions Requiring Skilled Therapeutic Intervention for dysphagia:    Patient is performing below functional baseline d/t  current acute condition, Multiple diagnoses, multiple medications, and increased dependency upon caregivers. Specific dysphagia interventions to include:      Therapeutic exercises  Trials of upgraded diet/liquid     Specific instructions for next treatment: development and training of compensatory swallow strategies to improve airway protection and swallow function, therapeutic po trial to determine safety of advanced diet textures and consistencies, initiate instruction of compensatory strategies, and ongoing skilled analysis to determine if patient is able to participate in MBSS    Patient Treatment Goals:    Short Term Goals:  Pt will participate in MBSS to fully assess oropharyngeal swallow function and to assist in determining the least restrictive PO diet to maintain adequate nutrition/hydration   Pt will implement identified compensatory swallowing strategies on 90% of opportunities or greater to improve airway protection and swallow function. Pt will complete PO trials of upgraded diet textures with SLP only to determine the least restrictive PO diet to maintain adequate nutrition/hydration with no more than 1 overt s/s of pen/asp. Long Term Goals:   Pt will maintain adequate nutrition/hydration via PO intake of the least restrictive oral diet with implementation of safe swallow/ compensatory strategies and decrease signs/symptoms of aspiration to less than 1 x/day.       Patient/family Goal:    To be able to eat/drink better consistency foods/liquids    Plan of care discussed with Patient   The Patient did not demonstrate complete understanding of the diagnosis, prognosis and plan of care     Rehabilitation Potential/Prognosis: fair                    ADMITTING DIAGNOSIS: Acute ischemic stroke ([facility] [address] [I63.9]    VISIT DIAGNOSIS:      PATIENT REPORT/COMPLAINT: coughing with liquids  RN cleared patient for participation in assessment     yes     PRIOR LEVEL OF SWALLOW FUNCTION:    PAST HISTORY OF DYSPHAGIA?: none reported    Home diet: Diet information not available     Current Diet Order:  Diet NPO Exceptions are: Sips of Water with Meds    PROCEDURE:  Consistencies Administered During the Evaluation   Liquids: thin liquid and honey thick liquid   Solids: pureed foods      Method of Intake:   cup, spoon  Fed by clinician      Position:   Seated, upright    CLINICAL ASSESSMENT:  Oral Stage:       Delayed A-P transit due to: reduced lingual strength   Left anterior spillage of oral secretions  Incoordination of oral manipulation of bolus with puree consistency      Pharyngeal Stage:    Throat clearing present after presentation of thin liquid  Latent wet cough was noted after presentation of thin liquid  Per chart review, patient was demonstrating wet vocal quality with puree consistency. However, no wet vocal quality was exhibited with puree consistency this date. Patient continued to demonstrate multiple swallows and throat clearing with cup sips of thin liquids this date. Cognition:   Within functional limits for this exam    Oral Peripheral Examination   Generalized oral weakness    Current Respiratory Status    room air     Parameters of Speech Production  Respiration:  Adequate for speech production  Quality:   Hoarse  Intensity: Within functional limits    Volitional Swallow: present     Volitional Cough:   present     Pain: No pain reported. EDUCATION:   The Speech Language Pathologist (SLP) completed education regarding results of evaluation and that intervention is warranted at this time. Learner: Patient  Education: Reviewed results and recommendations of this evaluation  Evaluation of Education:  Verbalizes understanding and Needs further instruction    This plan may be re-evaluated and revised as warranted. Evaluation Time includes thorough review of current medical information, gathering information on past medical history/social history and prior level of function, completion of standardized testing/informal observation of tasks, assessment of data and education on plan of care and goals. [x]The admitting diagnosis and active problem list, have been reviewed prior to initiation of this evaluation.         ACTIVE PROBLEM LIST:   Patient Active Problem List   Diagnosis    Cerebellar stroke [facility])    Vertebral artery dissection (H[facility])    Chronic obstructive pulmonary disease ([facility] [address]         CPT code:  33673  bedside swallow elly Daniels M.S. CCC-SLP/L  Speech Language Pathologist  [id number]

## 2023-01-21 NOTE — PROGRESS NOTES
Patient continues to be lethargic and uncooperative with neuro exams. NP Giselle notified, no orders or concerns from her at this time.

## 2023-01-21 NOTE — PROGRESS NOTES
Patient to PACU  placed on appropriate monitors. Cart low, locked with siderails up. Call light within reach.

## 2023-01-21 NOTE — PLAN OF CARE
Problem: Discharge Planning  Goal: Discharge to home or other facility with appropriate resources  1/21/2023 0752 by German Jara RN  Outcome: Progressing  1/20/2023 2023 by Sudeep Renner RN  Outcome: Progressing  Flowsheets (Taken 1/20/2023 0800 by German Jara RN)  Discharge to home or other facility with appropriate resources: Identify barriers to discharge with patient and caregiver     Problem: Chronic Conditions and Co-morbidities  Goal: Patient's chronic conditions and co-morbidity symptoms are monitored and maintained or improved  1/21/2023 0752 by German Jara RN  Outcome: Progressing  1/20/2023 2023 by Sudeep Renner RN  Outcome: Progressing  Flowsheets (Taken 1/20/2023 0800 by German Jara RN)  Care Plan - Patient's Chronic Conditions and Co-Morbidity Symptoms are Monitored and Maintained or Improved:   Monitor and assess patient's chronic conditions and comorbid symptoms for stability, deterioration, or improvement   Collaborate with multidisciplinary team to address chronic and comorbid conditions and prevent exacerbation or deterioration     Problem: ABCDS Injury Assessment  Goal: Absence of physical injury  1/21/2023 0752 by German Jara RN  Outcome: Progressing  1/20/2023 2023 by Sudeep Renner RN  Outcome: Progressing     Problem: Safety - Adult  Goal: Free from fall injury  1/21/2023 0752 by German Jara RN  Outcome: Progressing  1/20/2023 2023 by Sudeep Renner RN  Outcome: Progressing     Problem: Pain  Goal: Verbalizes/displays adequate comfort level or baseline comfort level  1/21/2023 0752 by German Jara RN  Outcome: Progressing  1/20/2023 2023 by Sudeep Renner RN  Outcome: Progressing

## 2023-01-21 NOTE — CONSULTS
Vascular Surgery Consultation Note    Reason for Consult:  Left vertebral artery occlusion, stroke    HPI : This is a 78 y.o. male admitted on 1/20/2023 12:29 AM with left vertebral artery occlusion, stroke. He was tx from [facility]. Per chart review and discussion with admitting physician at [facility] 117 as pt unable to provide hx. Per discussion with primary at [facility] he was having problems with walking since 1/13/23, difficulty with balance and movement, falling to the side with walking . His sxs worsened developing fascial droop and speech changes and he was evaluated at [facility]. He was subsequently admitted to the hospital at [facility] because of speech, facial droop, and ataxia. He was noted on imaging to have left cerebellar infarct and was started on aspirin and plavix. CTA noted thrombosed left vertebral artery. Recommendation was made for tx to higher level of care. Neurology recommended that he be started on heparin drip. He was tx to [facility]. After admission to [facility] per notes he developed vertical diplopia. He is currently on integrillin drip also per neuroir after thrombectomy of L vertebral artery and stenting x 5 by  Dr. Helton Govern 1/20/23. He noted below findings. 1. Left vertebral artery partial thrombosis of the V1 and v2 segments  2. Complete thrombosis of the intracranial Left V4 segment with occluded flow to the basilar  3. Successful thrombectomy with TICI 3 recanalization   4. Complete stent assisted reconstruction of the vertebral artery    Per nursing staff pt is more somnolent and is not following commands as well as he was pre procedure. He is on precedex drip. He did receive multiple doses of ativan overnight for agitation. He also has known hx of etoh abuse and there is concern that he is having DTs. Vascular surgery is consulted in regards to L vertebral artery occlusion.       Unable to obtain full ROS due to pt mental status    [facility] Hx  Stroke  L vertebral artery occlusion     Past Surgical History:   Procedure Laterality Date    HERNIA REPAIR       Current Medications:    dexmedetomidine (PRECEDEX) IV infusion 0.2 mcg/kg/hr (01/21/23 0650)    sodium chloride      niCARdipine Stopped (01/20/23 1705)    sodium chloride      eptifibatide 0.5 mcg/kg/min (01/20/23 1945)      ondansetron **OR** ondansetron, polyethylene glycol, perflutren lipid microspheres, hydrALAZINE, sodium chloride flush, [Held by provider] LORazepam **OR** [Held by provider] LORazepam **OR** [Held by provider] LORazepam **OR** [Held by provider] LORazepam **OR** [Held by provider] LORazepam **OR** [Held by provider] LORazepam **OR** [Held by provider] LORazepam **OR** [Held by provider] LORazepam, sodium chloride flush, sodium chloride, acetaminophen    potassium chloride  10 mEq IntraVENous Q1H    magnesium sulfate  2,000 mg IntraVENous Once    atorvastatin  80 mg Oral Nightly    sodium chloride flush  5-40 mL IntraVENous 2 times per day    thiamine  100 mg IntraVENous Daily    multivitamin  1 tablet Oral Daily    arformoterol tartrate  15 mcg Nebulization BID    budesonide  0.5 mg Nebulization BID    pantoprazole (PROTONIX) 40 mg injection  40 mg IntraVENous Daily    folic acid  1 mg IntraVENous Daily    sodium chloride flush  5-40 mL IntraVENous 2 times per day    aspirin  300 mg Rectal Daily    ticagrelor  90 mg Oral BID      Allergies:  Patient has no known allergies.   Social History     Socioeconomic History    Marital status:      Spouse name: Not on file    Number of children: Not on file    Years of education: Not on file    Highest education level: Not on file   Occupational History    Not on file   Tobacco Use    Smoking status: Every Day     Packs/day: 0.75     Years: 40.00     Pack years: 30.00     Types: Cigarettes     Start date: 8/5/1979    Smokeless tobacco: Never   Substance and Sexual Activity    Alcohol use: Not on file    Drug use: Not on file    Sexual activity: Not on file Other Topics Concern    Not on file   Social History Narrative    Not on file     Social Determinants of Health     Financial Resource Strain: Not on file   Food Insecurity: Not on file   Transportation Needs: Not on file   Physical Activity: Not on file   Stress: Not on file   Social Connections: Not on file   Intimate Partner Violence: Not on file   Housing Stability: Not on file     History reviewed. No pertinent family history.   PHYSICAL EXAM:    /69   Pulse 84   Temp 96.8 °F (36 °C) (Temporal)   Resp 16   Ht 6' (1.829 m)   Wt 158 lb (71.7 kg)   SpO2 99%   BMI 21.43 kg/m²   CONSTITUTIONAL:   Arousable but somnolent and agitated  PSYCHIATRIC :  Unable to assess orientation     Limited insight to disease process  EYES: Lids and lashes normal  ENT:  External ears and nose without lesions   Hearing deficits unable to fully evaluate   NECK: Supple, symmetrical, trachea midline   Thyroid goiter not appreciated  LUNGS:  No increased work of breathing                 Good respiratory excursion  CARDIOVASCULAR:  regular rate and rhythm   ABDOMEN:  soft, non-distended, non-tender  Lymphatics : Cervical lymphadenopathy      Femoral lymphadenopathy   SKIN:   Normal skin color   Texture and turgor normal, no induration  EXTREMITIES:   R UE Not following command and is in restraints but is moving fingers, hand and arm   No cyanosis noted in nail beds  L UE Not following command and is in restraints but is moving fingers, hand and arm   No cyanosis noted in nail beds  R LE Edema none   Open wounds absent    Foot cooler than left   Moving feet but not to command  L LE Edema none   Open wound absent   Moving feet but not to command  R femoral 2+ L femoral 2+   R posterior tibial 2+ L posterior tibial 2+   R dorsalis pedis biphasic L dorsalis pedis biphasic     LABS:    Lab Results   Component Value Date    WBC 6.4 01/21/2023    HGB 11.8 (L) 01/21/2023    HCT 33.6 (L) 01/21/2023     01/21/2023    K 3.6 01/21/2023    BUN 10 01/21/2023    CREATININE 0.5 (L) 01/21/2023     Lab Results   Component Value Date    LABA1C 5.1 01/20/2023     Lab Results   Component Value Date     01/19/2023     Lab Results   Component Value Date    LABALBU 3.9 01/21/2023      Radiology pertinent to vascular surgery evaluation reviewed    Assesment/Plan  Cerebellar stroke  Left vertebral artery occlusion s/p thrombectomy and stent per NeuroIR 1/20/23  Further anticoagulation/antiplatelet regimen per neuro ir  No plans for vascular surgical intervention at this time    Lea Smith MD

## 2023-01-21 NOTE — PROGRESS NOTES
[facility]   Progress note      Subjective: The patient is confused and agitated  Resting comfortably in no acute distress    Objective:    BP (!) 162/88   Pulse 73   Temp 98.1 °F (36.7 °C) (Temporal)   Resp 16   Ht 6' (1.829 m)   Wt 158 lb (71.7 kg)   SpO2 100%   BMI 21.43 kg/m²     In: 2568.3 [P.O.:50; I.V.:2098.8]  Out: 3122   In: 2568.3   Out: 3122 [Urine:2722]    General appearance: NAD, conversant but nonsensical  HEENT: AT/NC, MMM  Neck: FROM, supple  Lungs: Clear to auscultation  CV: RRR, no MRGs  Vasc: Radial pulses 2+  Abdomen: Soft, non-tender; no masses or HSM  Extremities: No peripheral edema or digital cyanosis  Skin: no rash, lesions or ulcers  Not able to assess proper psych and neuro  Recent Labs     01/20/23  0106 01/20/23  0425 01/21/23  0403   WBC 5.7 5.2 6.4   HGB 12.5 12.2* 11.8*   HCT 36.4* 35.3* 33.6*    267 270       Recent Labs     01/20/23  0425 01/21/23  0403 01/21/23  0931    131* 133   K 3.8 3.6 3.9    99 99   CO2 22 19* 19*   BUN 11 10 12   CREATININE 0.6* 0.5* 0.6*   CALCIUM 8.7 8.6 8.9       Assessment:    Principal Problem:    Cerebellar stroke (HCC)  Active Problems:    Vertebral artery dissection (HCC)    Chronic obstructive pulmonary disease (HCC)  Resolved Problems:    * No resolved hospital problems.  *      Plan:    72-year-old male who developed slurred speech and difficulty walking presented to an outside facility was diagnosed with subacute infarct and was transferred to [address] [place] [address] MICU with     Acute ischemic cerebellar stroke  Patient agitated and confused and really not able to provide much history  MRA/CTA from [place]-thrombus left vertebral artery dissection versus left vertebral artery thromboembolism  Low-dose heparin drip-with titration parameters  1/20/2023-underwent extensive reconstruction, thrombectomy and vertebral artery stenting-please electronic medical  Radiology's procedure notes below  ASA and Brilinta, Integrilin drip has been initiated   high intensity statin, blood pressure control  Swallow study once patient able to tolerate  Echocardiogram - pending  Ultrasound bilateral lower extremity-rule out DVT  IV hydration  Smoking cessation  Neurology and neuro interventional radiology following     Bradycardia of unknown etiology  Hold offending agents  Keep atropine at bedside  Check lipid panel  Consult EP,-await input possible pacemaker placement    1/21/2023  In four-point restraints today-soft bilaterally  Agitated confused unable to provide much of the history  Echocardiogram completed with ejection fraction 60 to 65%-no evidence of shunt or PFO  Head CT to be completed if patient can tolerate without being agitated-increase Precedex for this  Case discussed with Dr. Migdalia Alexandre  Neuro interventional radiology, EP, vascular, critical care team all on board  Consider phenobarbital for severe alcohol withdrawal symptoms use  For now he is okay on Precedex  Remains on off Cardene, Integrilin, drips  Continue dual antiplatelet therapy with Brilinta and aspirin  Medicine will continue to follow    Code Status:    Consultants:    DVT Prophylaxis   PT/OT  Discharge planning           Denice Estrada MD  6:19 PM  1/21/2023

## 2023-01-21 NOTE — PROGRESS NOTES
Interventional Neuro     Reason For Consultation:Left Vertebral artery thrombosis all the way from V2 to V4 segement  Consulted by [facility] Physician : Priscilla Arriola MD          Assessment   Left vertebral artery thrombosis all the way from the V2 to V4 segment  Causing strokes since 1/17/2023 with progressive worsening  Gait ataxia 1/18/2023  Dysmetria 1/19/2023  Slurred speech 1/19/2023  Transferred in from outside hospital    Recurrent artery to artery embolization causing multiple cerebellar strokes brainstem ischemia on repeat MRI   Multiple cranial nerve palsy new 1/20/2023  Upgaze palsy 1/20/2023 the  Bilateral facial palsy 1/20/2023  Left LPS palsy ( III CN) 1/20/2023  Left superior quadrantanopsia visual field defect 1/21/2023- new post op  History of chronic alcohol use with symptoms of alcohol withdrawal.  Delirium    Post procedure diagnosis  1. 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 Partial from doses of the V1 and V2 segments suspicious for vessel   trauma. 2. Successful thrombectomy followed by reocclusion of the left   vertebral artery both extracranial and intracranial segments. 3. Complete stent assisted reconstruction of the extracranial left   vertebral artery with distal embolic protection. 4. Left posterior cerebral artery and right posterior cerebral artery   thrombectomy as a result from artery to artery emboli   5.  Successful rescue mechanical intracranial stenting of the V4   segment to prevent reocclusion           Plan/Medical Decision Making  Continue aspirin and Brilinta aspirin should be 81 mg or 300 mg rectal.  Brilinta should be 90 mg 2 times a day he should not miss any doses    Should he fail his swallow and NG tube should be placed    Speech has seen the patient and he has passed a swallow so I do not anticipate this being an issue    Continue Integrilin drip for 24 hours        Chief Complaint-bilateral dysmetria gait ataxia inability to walk diplopia inter nuclear ophthalmoplegia    HPI-  The patient is a 78 y.o. male with posterior circulation symptoms including bilateral dysmetria gait ataxia that started on Tuesday had went to the ER at outside hospital yesterday had been there the whole day and later last night multiple team members were contacted eventually neurology discussed the case with me I did agree on the heparin drip to maximize medical therapy due to the presence of a large vertebral thrombus on report please note no images were available for us to review as this is the outside [facility] hospital.  Patient has been admitted to the ICU. Today on neurology evaluation this morning diplopia was noticed including 6 cranial nerve palsy. Patient seems to have a bilateral facial droop also. MRI brain was emergently obtained which showed that there was extensive infarction in bilateral cerebral hemispheres. However given the symptoms and review of his images that were obtained by my staff and not sent over by outside hospital we ultimately determined that intervention would be a lifesaving therapy discussed with family and the patient and they both verbalized understanding they understand the risk and benefits and agreed to intervention. .          Objective      I/O last 3 completed shifts: In: 1957.3 [P.O.:50; I.V.:1651.7; IV Piggyback:255.6]  Out: 2260 [Urine:2257; Blood:400]  I/O this shift: In: 57.9 [I.V.:54.6; IV Piggyback:3.3]  Out: 60 [Urine:60]         Vitals:    01/21/23 1330   BP:    Pulse: 81   Resp: 17   Temp:    SpO2: 98%         Physical Exam  CONST: Alert dysphoric hypophonic  ENMT: external ears normal to inspection and palpation, hearing   EYE: Right 6th nerve palsy partial left 6th nerve palsy  NECK: -Complains of severe neck pain in the back of the head pain on flexion  CV: nl S1 and S2  PUL: negative  ABD: Abdomen soft and nontender without distention, masses , no wound infection noted.   Musculoskeletal: no cyanosis, clubbing or edema present        Neurologic exam    NIH Stroke Scale/Score at time of initial evaluation: Patient seems to be delirious at this time however he is moving all 4 extremities really well and has a clear speech and makes appropriate conversation. Reports he needs to go home because he has a pitbull at home and needs to take care of his dog. 1A: Level of Consciousness 0 - alert; keenly responsive   1B: Ask Month and Age 0 - answers both questions correctly   1C: Tell Patient To Open and Close Eyes, then Hand  Squeeze 0 - performs both tasks correctly   2: Test Horizontal Extraocular Movements 1 - partial gaze palsy   3: Test Visual Fields 1   4: Test Facial Palsy 2 - partial paralysis (total or near total paralysis of the lower face)   5A: Test Left Arm Motor Drift 0 - no drift, limb holds 90 (or 45) degrees for full 10 seconds   5B: Test Right Arm Motor Drift 0 - no drift, limb holds 90 (or 45) degrees for full 10 seconds   6A: Test Left Leg Motor Drift 0 - no drift; leg holds 30 degree position for full 5 seconds   6B: Test Right Leg Motor Drift 0 - no drift; leg holds 30 degree position for full 5 seconds   7: Test Limb Ataxia (FNF/Heel-Shin) 2 - present in two limbs   8: Test Sensation 0 - normal; no sensory loss   9: Test Language/Aphasia 1 - mild to moderate aphasia; some obvious loss of fluency or facility of comprehension without significant limitation on ideas expressed or form of expression. Reduction of speech and/or comprehension, however, makes conversation about provided materials difficult or impossible. For example, in conversation about provided materials, examiner can identify picture or naming card content from patient's response.     10: Test Dysarthria 1 - mild to moderate, patient slurs at least some words and at worst, can be understood with some difficulty   11: Test Extinction/Inattention 0 - no abnormality   Total 8                      Social History Tobacco History       Smoking Status  Every Day Smoking Start Date  8/5/1979 Smoking Frequency  0.75 packs/day for 40.00 years (30.00 pk-yrs) Smoking Tobacco Type  Cigarettes since 8/5/1979      Smokeless Tobacco Use  Never              Alcohol History       Alcohol Use Status  Not Asked              Drug Use       Drug Use Status  Not Asked              Sexual Activity       Sexually Active  Not Asked                    History reviewed. No pertinent family history. Patient does not have any history of stroke        Review of Systems-    Constitutional: Denies fever, chills, fatigue, and recent weight loss/gain  Eyes: Denies blurred vision, decreased vision, double vision and eye pain  ENT: Denies vertigo, hearing changes, and difficulty swallowing  Resp: Denies SOB, cough, sputum production, and hemoptysis  CV: Denies chest pain, claudication, irregular heart beat, lower extremity edema, palpitations and KINGSLEY  GI: Denies abdominal pain, n/v/d, constipation, and hematoemesis or hematochezia   : Denies dysuria, frequency, hematuria, incontinence and nocturnia  MS: Denies joint and muscle pain   Endocrine: Denies diabetes, thyroid disorder and adrenal dysfunction  Neurologic: Severe weakness all out. Has diplopia. Imaging-Reviewed and my independent evaluation differing from report will be as below if so will be indicated in my note    CT HEAD WO CONTRAST    Result Date: 1/20/2023  Subacute infarction localized in the inferior left cerebellar hemisphere in the PICA distribution. No hemorrhage. Chronic parenchymal change including atrophy and old white matter ischemia. MRI BRAIN WO CONTRAST    Result Date: 1/20/2023  1. Bilateral acute/subacute infarcts including left PICA infarct and small lacunar infarct in the right cerebellum, similar compared to the prior study. 2. Probable area of old petechial hemorrhage in the right lateral ezekiel. 3. Otherwise, no new acute intracranial abnormality seen.  4. Chronic sinus disease.            Labs-Reviewed , reviewed by me            REASONS FOR DELAY for MECHANICAL ENDOVASCULAR REPERFUSION THERAPY       SOCIAL /Sabianist No    INITIAL REFUSAL/ INDECISION TO CONSENT No    CARE - TEAM UNABLE TO DETERMINE ELIGIBILITY No        MANAGEMENT OF ABCs No   INVESTIGATIONAL OR EXPERIMENTAL PROTOCOL {Yes                                          * Does not exclude patient from measure

## 2023-01-21 NOTE — PROGRESS NOTES
Attempted to see patient twice today but patient was off the unit in interventional radiology  Will complete consult tomorrow    [address] electrophysiology  [facility] ([facility]) physicians

## 2023-01-21 NOTE — PROGRESS NOTES
Neurointerventional POST Procedure Note      Date of Service: 23      Patient Name: Jefry Mi   : 1955  Medical record number:  [id number]        Procedure:   Left Vertebral artery thrombectomy  Left vertebral artery complete reconstruction with 5 stents      Physician: Michelle Campo MD  Assistant: Brayan Juarez RT  Access:Right femoral artery  Vessels injected: Left Vertebral artery   Hemostasis: achieved 8 F angioseal  Anesthesia: Please see flowchart  Specimens:     Blood loss: 500 ml;   Contrast Material:  please see dictation in PACS  Fluoro time: Please see dictation in PACS      Diagnosis/Findings:   1. Left vertebral artery partial thrombosis of the V1 and v2 segments  2. Complete thrombosis of the intracranial Left V4 segment with occluded flow to the basilar  3. Successful thrombectomy with TICI 3 recanalization   4. Complete stent assisted reconstruction of the vertebral artery    Plan:  1. Q15 min neuro checks x2 hours, Q30 for 6 hours and q1h for 24 hours and then q4h/q6h till discharge   2. Maintain SBP <160  MAP always greater than 60 unless otherwise specified by us in updated note elsewhere. Can use pressors if MAP is less than 55    3. Neurovascular checks   4. 24 hour CT head   5. STAT CT head for any neurological decline  6.  Antiplatelet Recs ASA + Ticagrelor  Integrellin drip for 24 hours

## 2023-01-21 NOTE — ANESTHESIA POSTPROCEDURE EVALUATION
Department of Anesthesiology  Postprocedure Note    Patient: Enrrique Walsh  MRN: [id number]  YOB: 1955  Date of evaluation: 1/20/2023      Procedure Summary     Date: 01/20/23 Room / Location: [address] [place] [address] Procedures; [facility] Radiology    Anesthesia Start: [address] 21 Anesthesia Stop: 1920    Procedures:       IR TRANSCATHETER INTRAVASCULAR UPPER PERIPH STENT INTRO      IR INTRACRANIAL STENT(S)      IR MECHANICAL ART THROMBECTOMY INIT Diagnosis:       (lt verterbral stenosis, Stroke )      (lt verterbral stenosis, Stroke )      (lt verterbral stenosis, Stroke )    Scheduled Providers: [facility] Radiologist Responsible Provider: Kaleb Adan DO    Anesthesia Type: general ASA Status: 4          Anesthesia Type: No value filed.     Karthik Phase I: Karthik Score: 10    Karthik Phase II:        Anesthesia Post Evaluation    Patient location during evaluation: PACU  Patient participation: complete - patient participated  Level of consciousness: awake and alert  Airway patency: patent  Nausea & Vomiting: no nausea and no vomiting  Complications: no  Cardiovascular status: blood pressure returned to baseline  Respiratory status: acceptable  Hydration status: euvolemic

## 2023-01-21 NOTE — PLAN OF CARE
Problem: Safety - Medical Restraint  Goal: Remains free of injury from restraints (Restraint for Interference with Medical Device)  Description: INTERVENTIONS:  1. Determine that other, less restrictive measures have been tried or would not be effective before applying the restraint  2. Evaluate the patient's condition at the time of restraint application  3. Inform patient/family regarding the reason for restraint  4.  Q2H: Monitor safety, psychosocial status, comfort, nutrition and hydration  1/21/2023 0753 by Jess Hernandez RN  Outcome: Progressing  1/21/2023 0752 by Jess Hernandez RN  Outcome: Progressing  Flowsheets (Taken 1/21/2023 8772 by Darin Ag RN)  Remains free of injury from restraints (restraint for interference with medical device): Every 2 hours: Monitor safety, psychosocial status, comfort, nutrition and hydration

## 2023-01-22 ENCOUNTER — APPOINTMENT (OUTPATIENT)
Dept: ULTRASOUND IMAGING | Age: 68
DRG: 270 | End: 2023-01-22
Attending: INTERNAL MEDICINE
Payer: MEDICARE

## 2023-01-22 LAB
ALBUMIN SERPL-MCNC: 3.5 G/DL (ref 3.5–5.2)
ALP BLD-CCNC: 96 U/L (ref 40–129)
ALT SERPL-CCNC: 14 U/L (ref 0–40)
ANION GAP SERPL CALCULATED.3IONS-SCNC: 13 MMOL/L (ref 7–16)
APTT: 26.8 SEC (ref 24.5–35.1)
APTT: 27.4 SEC (ref 24.5–35.1)
AST SERPL-CCNC: 19 U/L (ref 0–39)
BASOPHILS ABSOLUTE: 0.02 E9/L (ref 0–0.2)
BASOPHILS RELATIVE PERCENT: 0.3 % (ref 0–2)
BILIRUB SERPL-MCNC: 0.6 MG/DL (ref 0–1.2)
BUN BLDV-MCNC: 10 MG/DL (ref 6–23)
CALCIUM IONIZED: 1.23 MMOL/L (ref 1.15–1.33)
CALCIUM SERPL-MCNC: 8.1 MG/DL (ref 8.6–10.2)
CHLORIDE BLD-SCNC: 105 MMOL/L (ref 98–107)
CO2: 19 MMOL/L (ref 22–29)
CREAT SERPL-MCNC: 0.6 MG/DL (ref 0.7–1.2)
EOSINOPHILS ABSOLUTE: 0.05 E9/L (ref 0.05–0.5)
EOSINOPHILS RELATIVE PERCENT: 0.8 % (ref 0–6)
GFR SERPL CREATININE-BSD FRML MDRD: >60 ML/MIN/1.73
GLUCOSE BLD-MCNC: 92 MG/DL (ref 74–99)
HCT VFR BLD CALC: 28.4 % (ref 37–54)
HEMOGLOBIN: 10.1 G/DL (ref 12.5–16.5)
IMMATURE GRANULOCYTES #: 0.02 E9/L
IMMATURE GRANULOCYTES %: 0.3 % (ref 0–5)
LYMPHOCYTES ABSOLUTE: 1.49 E9/L (ref 1.5–4)
LYMPHOCYTES RELATIVE PERCENT: 23.8 % (ref 20–42)
MAGNESIUM: 2 MG/DL (ref 1.6–2.6)
MCH RBC QN AUTO: 30.8 PG (ref 26–35)
MCHC RBC AUTO-ENTMCNC: 35.6 % (ref 32–34.5)
MCV RBC AUTO: 86.6 FL (ref 80–99.9)
MONOCYTES ABSOLUTE: 0.54 E9/L (ref 0.1–0.95)
MONOCYTES RELATIVE PERCENT: 8.6 % (ref 2–12)
NEUTROPHILS ABSOLUTE: 4.14 E9/L (ref 1.8–7.3)
NEUTROPHILS RELATIVE PERCENT: 66.2 % (ref 43–80)
PDW BLD-RTO: 13 FL (ref 11.5–15)
PHOSPHORUS: 2.7 MG/DL (ref 2.5–4.5)
PLATELET # BLD: 247 E9/L (ref 130–450)
PMV BLD AUTO: 8.9 FL (ref 7–12)
POTASSIUM SERPL-SCNC: 3.6 MMOL/L (ref 3.5–5)
RBC # BLD: 3.28 E12/L (ref 3.8–5.8)
SODIUM BLD-SCNC: 137 MMOL/L (ref 132–146)
TOTAL PROTEIN: 6 G/DL (ref 6.4–8.3)
WBC # BLD: 6.3 E9/L (ref 4.5–11.5)

## 2023-01-22 PROCEDURE — 6360000002 HC RX W HCPCS

## 2023-01-22 PROCEDURE — 85730 THROMBOPLASTIN TIME PARTIAL: CPT

## 2023-01-22 PROCEDURE — 6370000000 HC RX 637 (ALT 250 FOR IP)

## 2023-01-22 PROCEDURE — 94640 AIRWAY INHALATION TREATMENT: CPT

## 2023-01-22 PROCEDURE — 2500000003 HC RX 250 WO HCPCS: Performed by: INTERNAL MEDICINE

## 2023-01-22 PROCEDURE — 2580000003 HC RX 258: Performed by: PSYCHIATRY & NEUROLOGY

## 2023-01-22 PROCEDURE — 6370000000 HC RX 637 (ALT 250 FOR IP): Performed by: PSYCHIATRY & NEUROLOGY

## 2023-01-22 PROCEDURE — 80053 COMPREHEN METABOLIC PANEL: CPT

## 2023-01-22 PROCEDURE — 84100 ASSAY OF PHOSPHORUS: CPT

## 2023-01-22 PROCEDURE — 85025 COMPLETE CBC W/AUTO DIFF WBC: CPT

## 2023-01-22 PROCEDURE — 2000000000 HC ICU R&B

## 2023-01-22 PROCEDURE — 6370000000 HC RX 637 (ALT 250 FOR IP): Performed by: INTERNAL MEDICINE

## 2023-01-22 PROCEDURE — 2700000000 HC OXYGEN THERAPY PER DAY

## 2023-01-22 PROCEDURE — 2580000003 HC RX 258

## 2023-01-22 PROCEDURE — 99291 CRITICAL CARE FIRST HOUR: CPT | Performed by: INTERNAL MEDICINE

## 2023-01-22 PROCEDURE — 83735 ASSAY OF MAGNESIUM: CPT

## 2023-01-22 PROCEDURE — 82330 ASSAY OF CALCIUM: CPT

## 2023-01-22 PROCEDURE — 93886 INTRACRANIAL COMPLETE STUDY: CPT

## 2023-01-22 PROCEDURE — C9113 INJ PANTOPRAZOLE SODIUM, VIA: HCPCS

## 2023-01-22 PROCEDURE — 99232 SBSQ HOSP IP/OBS MODERATE 35: CPT | Performed by: PSYCHIATRY & NEUROLOGY

## 2023-01-22 RX ORDER — POTASSIUM CHLORIDE 7.45 MG/ML
10 INJECTION INTRAVENOUS
Status: COMPLETED | OUTPATIENT
Start: 2023-01-22 | End: 2023-01-22

## 2023-01-22 RX ORDER — ASPIRIN 81 MG/1
81 TABLET, CHEWABLE ORAL DAILY
Status: DISCONTINUED | OUTPATIENT
Start: 2023-01-22 | End: 2023-01-26 | Stop reason: HOSPADM

## 2023-01-22 RX ORDER — PANTOPRAZOLE SODIUM 40 MG/1
40 TABLET, DELAYED RELEASE ORAL
Status: DISCONTINUED | OUTPATIENT
Start: 2023-01-22 | End: 2023-01-26 | Stop reason: HOSPADM

## 2023-01-22 RX ADMIN — Medication 10 MEQ: at 07:12

## 2023-01-22 RX ADMIN — Medication 10 ML: at 09:00

## 2023-01-22 RX ADMIN — SODIUM CHLORIDE, PRESERVATIVE FREE 40 MG: 5 INJECTION INTRAVENOUS at 08:25

## 2023-01-22 RX ADMIN — FOLIC ACID 1 MG: 5 INJECTION, SOLUTION INTRAMUSCULAR; INTRAVENOUS; SUBCUTANEOUS at 08:22

## 2023-01-22 RX ADMIN — TICAGRELOR 90 MG: 90 TABLET ORAL at 08:22

## 2023-01-22 RX ADMIN — Medication 10 MEQ: at 11:16

## 2023-01-22 RX ADMIN — SODIUM CHLORIDE, PRESERVATIVE FREE 10 ML: 5 INJECTION INTRAVENOUS at 20:08

## 2023-01-22 RX ADMIN — Medication 10 MEQ: at 08:52

## 2023-01-22 RX ADMIN — ATORVASTATIN CALCIUM 80 MG: 80 TABLET, FILM COATED ORAL at 20:08

## 2023-01-22 RX ADMIN — Medication 10 MEQ: at 10:05

## 2023-01-22 RX ADMIN — ASPIRIN 300 MG: 300 SUPPOSITORY RECTAL at 08:31

## 2023-01-22 RX ADMIN — ASPIRIN 81 MG CHEWABLE TABLET 81 MG: 81 TABLET CHEWABLE at 08:50

## 2023-01-22 RX ADMIN — THIAMINE HYDROCHLORIDE 100 MG: 100 INJECTION, SOLUTION INTRAMUSCULAR; INTRAVENOUS at 08:25

## 2023-01-22 RX ADMIN — BUDESONIDE 500 MCG: 0.5 SUSPENSION RESPIRATORY (INHALATION) at 21:40

## 2023-01-22 RX ADMIN — SODIUM CHLORIDE, PRESERVATIVE FREE 10 ML: 5 INJECTION INTRAVENOUS at 09:00

## 2023-01-22 RX ADMIN — ARFORMOTEROL TARTRATE 15 MCG: 15 SOLUTION RESPIRATORY (INHALATION) at 08:53

## 2023-01-22 RX ADMIN — Medication 5 ML: at 20:08

## 2023-01-22 RX ADMIN — TICAGRELOR 90 MG: 90 TABLET ORAL at 20:08

## 2023-01-22 RX ADMIN — BUDESONIDE 500 MCG: 0.5 SUSPENSION RESPIRATORY (INHALATION) at 08:53

## 2023-01-22 RX ADMIN — ARFORMOTEROL TARTRATE 15 MCG: 15 SOLUTION RESPIRATORY (INHALATION) at 21:40

## 2023-01-22 RX ADMIN — Medication 1 TABLET: at 08:22

## 2023-01-22 ASSESSMENT — PAIN SCALES - GENERAL
PAINLEVEL_OUTOF10: 0

## 2023-01-22 NOTE — PROGRESS NOTES
24 hour head CT reviewed    NO BLEED< CONTINUE ASA and BIRLINTA  STENT seems patent no hyperdensity is seen in the intracranial stent placed    There appears to be a small new area of infarction as indicated below  No change in management    TCD ordered to check patency  and flow velocity of stent

## 2023-01-22 NOTE — PROGRESS NOTES
[facility]   Progress note      Subjective: The patient is much improved today  No longer in four-point restraints  Answering questions appropriately and following commands  Off Precedex  Objective:    /80   Pulse 78   Temp 97.7 °F (36.5 °C) (Temporal)   Resp 19   Ht 6' (1.829 m)   Wt 158 lb (71.7 kg)   SpO2 97%   BMI 21.43 kg/m²     In: 3955.9 [P.O.:200; I.V.:2910]  Out: 2510   In: 3955.9   Out: 2510 [Urine:2510]    General appearance: NAD, conversant and answering questions appropriately  HEENT: AT/NC, MMM  Neck: FROM, supple  Lungs: Clear to auscultation  CV: RRR, no MRGs  Vasc: Radial pulses 2+  Abdomen: Soft, non-tender; no masses or HSM  Extremities: No peripheral edema or digital cyanosis  Skin: no rash, lesions or ulcers    Recent Labs     01/20/23  0425 01/21/23  0403 01/22/23  0503   WBC 5.2 6.4 6.3   HGB 12.2* 11.8* 10.1*   HCT 35.3* 33.6* 28.4*    270 247       Recent Labs     01/21/23  0403 01/21/23  0931 01/22/23  0503   * 133 137   K 3.6 3.9 3.6   CL 99 99 105   CO2 19* 19* 19*   BUN 10 12 10   CREATININE 0.5* 0.6* 0.6*   CALCIUM 8.6 8.9 8.1*       Assessment:    Principal Problem:    Cerebellar stroke (HCC)  Active Problems:    Vertebral artery dissection (HCC)    Chronic obstructive pulmonary disease (HCC)    Sinus bradycardia  Resolved Problems:    * No resolved hospital problems.  *      Plan:    69-year-old male who developed slurred speech and difficulty walking presented to an outside facility was diagnosed with subacute infarct and was transferred to [address] [place] [address] MICU with     Acute ischemic cerebellar stroke  Patient agitated and confused and really not able to provide much history  MRA/CTA from Otho-thrombus left vertebral artery dissection versus left vertebral artery thromboembolism  Low-dose heparin drip-with titration parameters  1/20/2023-underwent extensive reconstruction, thrombectomy and vertebral artery stenting-please electronic medical  Radiology's procedure notes below  ASA and Brilinta, Integrilin drip has been initiated   high intensity statin, blood pressure control  Swallow study once patient able to tolerate  Echocardiogram - pending  Ultrasound bilateral lower extremity-rule out DVT  IV hydration  Smoking cessation  Neurology and neuro interventional radiology following     Bradycardia of unknown etiology  Hold offending agents  Keep atropine at bedside  Check lipid panel  Consult EP,-await input possible pacemaker placement    1/21/2023  In four-point restraints today-soft bilaterally  Agitated confused unable to provide much of the history  Echocardiogram completed with ejection fraction 60 to 65%-no evidence of shunt or PFO  Head CT to be completed if patient can tolerate without being agitated-increase Precedex for this  Case discussed with Dr. Sharonda Whitaker radiology, EP, vascular, critical care team all on board  Consider phenobarbital for severe alcohol withdrawal symptoms use  For now he is okay on Precedex  Remains on off Cardene, Integrilin, drips  Continue dual antiplatelet therapy with Brilinta and aspirin  Medicine will continue to follow    1/22/2023  Out of restraints  Following commands  Continue to monitor mentation  Remains on dual antiplatelet therapy due to placement of vertebral reconstruction with stents, high intensity statin  Off Integrilin  Watch for signs of bleeding  No significant symptoms of alcohol withdrawals, if any ensue would use phenobarbital or Ativan  Okay to transfer out of ICU setting from medicine standpoint  Neurology to continue to follow along with neuro interventional radiology      Code Status: Full code  Consultants: Neurology, neuro interventional radiology, ICU team  DVT Prophylaxis   PT/OT  Discharge planning           Leigh Silva MD  3:25 PM  1/22/2023

## 2023-01-22 NOTE — PROGRESS NOTES
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  Delirium    Post procedure diagnosis  1. 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 Partial from doses of the V1 and V2 segments suspicious for vessel   trauma. 2. Successful thrombectomy followed by reocclusion of the left   vertebral artery both extracranial and intracranial segments. 3. Complete stent assisted reconstruction of the extracranial left   vertebral artery with distal embolic protection. 4. Left posterior cerebral artery and right posterior cerebral artery   thrombectomy as a result from artery to artery emboli   5.  Successful rescue mechanical intracranial stenting of the V4   segment to prevent reocclusion           Plan/Medical Decision Making  Continue aspirin and Brilinta aspirin should be 81 mg or 300 mg rectal.  Brilinta should be 90 mg 2 times a day he should not miss any doses    Lipitor 80 mg QHS    Alcohol cessation was discussed   Patient verbalized understanding  Needs to follow up with me in 4-6 weeks    Price of ticagrelor needs to be checked prior to discharge    [address], [address] from 9/10 to 0/10 today     Doing much better today         Subjective  Doing well today   Much clear thought process  Does not recognize me   Other wise is alert and oriented  Has good thought process  No headache            Chief Complaint-bilateral dysmetria gait ataxia inability to walk diplopia inter nuclear ophthalmoplegia    HPI-  The patient is a 78 y.o. male with posterior circulation symptoms including bilateral dysmetria gait ataxia that started on Tuesday had went to the ER at outside hospital yesterday had been there the whole day and later last night multiple team members were contacted eventually neurology discussed the case with me I did agree on the heparin drip to maximize medical therapy due to the presence of a large vertebral thrombus on report please note no images were available for us to review as this is the outside [facility] hospital.  Patient has been admitted to the ICU. Today on neurology evaluation this morning diplopia was noticed including 6 cranial nerve palsy. Patient seems to have a bilateral facial droop also. MRI brain was emergently obtained which showed that there was extensive infarction in bilateral cerebral hemispheres. However given the symptoms and review of his images that were obtained by my staff and not sent over by outside hospital we ultimately determined that intervention would be a lifesaving therapy discussed with family and the patient and they both verbalized understanding they understand the risk and benefits and agreed to intervention. .          Objective      I/O last 3 completed shifts: In: 1995.3 [I.V.:1667; IV Piggyback:328.4]  Out: 0602 [Urine:3562]  I/O this shift:  In: 2839.1 [P.O.:100;  I.V.:2221.5; IV Piggyback:517.6]  Out: 505 [Urine:505]         Vitals:    01/22/23 1400   BP: 136/80   Pulse: 78   Resp: 19   Temp:    SpO2: 97%         Physical Exam  CONST: Alert dysphoric hypophonic  ENMT: external ears normal to inspection and palpation, hearing   EYE: Right 6th nerve palsy partial left 6th nerve palsy  NECK: -Complains of severe neck pain in the back of the head pain on flexion  CV: nl S1 and S2  PUL: negative  ABD: Abdomen soft and nontender without distention, masses , no wound infection noted. Musculoskeletal: no cyanosis, clubbing or edema present        Neurologic exam    Bilateral dysmetria and left superior quadrantopia    Strength 5/5 in all 4 extremities  DTRS 2+      NIHSS of 3-4          Social History       Tobacco History       Smoking Status  Every Day Smoking Start Date  8/5/1979 Smoking Frequency  0.75 packs/day for 40.00 years (30.00 pk-yrs) Smoking Tobacco Type  Cigarettes since 8/5/1979      Smokeless Tobacco Use  Never              Alcohol History       Alcohol Use Status  Not Asked              Drug Use       Drug Use Status  Not Asked              Sexual Activity       Sexually Active  Not Asked                    History reviewed. No pertinent family history. Patient does not have any history of stroke        Review of Systems-    Constitutional: Denies fever, chills, fatigue, and recent weight loss/gain  Eyes: Denies blurred vision, decreased vision, double vision and eye pain  ENT: Denies vertigo, hearing changes, and difficulty swallowing  Resp: Denies SOB, cough, sputum production, and hemoptysis  CV: Denies chest pain, claudication, irregular heart beat, lower extremity edema, palpitations and KINGSLEY  GI: Denies abdominal pain, n/v/d, constipation, and hematoemesis or hematochezia   : Denies dysuria, frequency, hematuria, incontinence and nocturnia  MS: Denies joint and muscle pain   Endocrine: Denies diabetes, thyroid disorder and adrenal dysfunction  Neurologic: Severe weakness all out. Has diplopia.           Imaging-Reviewed and my independent evaluation differing from report will be as below if so will be indicated in my note    CT HEAD WO CONTRAST    Result Date: 1/20/2023  Subacute infarction localized in the inferior left cerebellar hemisphere in the PICA distribution. No hemorrhage. Chronic parenchymal change including atrophy and old white matter ischemia. MRI BRAIN WO CONTRAST    Result Date: 1/20/2023  1. Bilateral acute/subacute infarcts including left PICA infarct and small lacunar infarct in the right cerebellum, similar compared to the prior study. 2. Probable area of old petechial hemorrhage in the right lateral ezekiel. 3. Otherwise, no new acute intracranial abnormality seen. 4. Chronic sinus disease.            Labs-Reviewed , reviewed by me            REASONS FOR DELAY for MECHANICAL ENDOVASCULAR REPERFUSION THERAPY       SOCIAL /Cheondoism No    INITIAL REFUSAL/ INDECISION TO CONSENT No    CARE - TEAM UNABLE TO DETERMINE ELIGIBILITY No        MANAGEMENT OF ABCs No   INVESTIGATIONAL OR EXPERIMENTAL PROTOCOL {Yes                                          * Does not exclude patient from measure

## 2023-01-22 NOTE — PLAN OF CARE
Pt in bilateral wrist restraints and bilateral ankle restraints. Problem: ABCDS Injury Assessment  Goal: Absence of physical injury  Outcome: Progressing     Problem: Safety - Adult  Goal: Free from fall injury  Outcome: Progressing     Problem: Pain  Goal: Verbalizes/displays adequate comfort level or baseline comfort level  Outcome: Progressing     Problem: Skin/Tissue Integrity  Goal: Absence of new skin breakdown  Description: 1. Monitor for areas of redness and/or skin breakdown  2. Assess vascular access sites hourly  3. Every 4-6 hours minimum:  Change oxygen saturation probe site  4. Every 4-6 hours:  If on nasal continuous positive airway pressure, respiratory therapy assess nares and determine need for appliance change or resting period. Outcome: Progressing     Problem: Safety - Medical Restraint  Goal: Remains free of injury from restraints (Restraint for Interference with Medical Device)  Description: INTERVENTIONS:  1. Determine that other, less restrictive measures have been tried or would not be effective before applying the restraint  2. Evaluate the patient's condition at the time of restraint application  3. Inform patient/family regarding the reason for restraint  4.  Q2H: Monitor safety, psychosocial status, comfort, nutrition and hydration  Outcome: Progressing  Flowsheets  Taken 1/22/2023 0000 by Connie Esqueda RN  Remains free of injury from restraints (restraint for interference with medical device): Every 2 hours: Monitor safety, psychosocial status, comfort, nutrition and hydration  Taken 1/21/2023 2200 by Connie Esqueda RN  Remains free of injury from restraints (restraint for interference with medical device): Every 2 hours: Monitor safety, psychosocial status, comfort, nutrition and hydration  Taken 1/21/2023 2000 by Connie Esqueda RN  Remains free of injury from restraints (restraint for interference with medical device): Every 2 hours: Monitor safety, psychosocial status, comfort, nutrition and hydration  Taken 1/21/2023 1445 by Gagandeep Davey RN  Remains free of injury from restraints (restraint for interference with medical device): Every 2 hours: Monitor safety, psychosocial status, comfort, nutrition and hydration     Problem: Discharge Planning  Goal: Discharge to home or other facility with appropriate resources  Outcome: Not Progressing  Flowsheets (Taken 1/21/2023 1800 by Agustín Hare RN)  Discharge to home or other facility with appropriate resources: Identify barriers to discharge with patient and caregiver     Problem: Chronic Conditions and Co-morbidities  Goal: Patient's chronic conditions and co-morbidity symptoms are monitored and maintained or improved  Outcome: Not Progressing  Flowsheets (Taken 1/21/2023 1800 by Agustín Hare RN)  Care Plan - Patient's Chronic Conditions and Co-Morbidity Symptoms are Monitored and Maintained or Improved: Monitor and assess patient's chronic conditions and comorbid symptoms for stability, deterioration, or improvement

## 2023-01-22 NOTE — PROGRESS NOTES
[address] [place] [address]  Department of Pulmonary, Critical Care and Sleep Medicine  Geetha Puentes, Dr. Leslee Salazar, Dr. Cardona All:    Patient seen and examined at bedside. Calmer this morning. Currently following commands and answering questions appropriately. Overall without complaints. OBJECTIVE:  Vitals:    01/22/23 1300 01/22/23 1400 01/22/23 1500 01/22/23 1600   BP: 117/71 136/80 127/82 (!) 147/83   Pulse: 90 78 83 79   Resp: 19 19 20 18   Temp:    97.9 °F (36.6 °C)   TempSrc:       SpO2: 96% 97%     Weight:       Height:           1. General: Well-nourished well-developed. Currently with no distress. 2. Eyes: Vision - grossly normal, PERRLA   3. HENT: Head is atraumatic & normocephalic. Neck Supple, No jugular venous distention   4. Respiratory: Lungs are clear to auscultation, Respirations are non-labored, Breath sounds are equal   5. Cardiovascular: S1, S2 normal, Regular rate & rhythm, No murmur, No pedal edema   6. Gastrointestinal: Soft, Non-tender, Non-distended, Normal bowel sounds. No organomegaly. 7. Neurologic: Continues to have dysmetria of left hand. Denies blurred vision at this time. Face symmetrical.  8. Skin: no rashes, breakdown  9. Musculoskeletal: no LE edema, no joint effusion.   10. Psychiatric -calm and cooperative    DATA:    CBC:   Lab Results   Component Value Date    WBC 6.3 01/22/2023    HGB 10.1 (L) 01/22/2023    HCT 28.4 (L) 01/22/2023    MCV 86.6 01/22/2023     01/22/2023     LFTs:   Lab Results   Component Value Date    ALT 14 01/22/2023    AST 19 01/22/2023    ALKPHOS 96 01/22/2023    BILITOT 0.6 01/22/2023    PROT 6.0 (L) 01/22/2023     Coags: No results found for: INR, PTT    RADIOLOGY:      CT HEAD WO CONTRAST    Result Date: 1/20/2023  EXAMINATION: CT OF THE HEAD WITHOUT CONTRAST  1/20/2023 12:42 am TECHNIQUE: CT of the head was performed without the administration of intravenous contrast. Automated exposure control, iterative reconstruction, and/or weight based adjustment of the mA/kV was utilized to reduce the radiation dose to as low as reasonably achievable. COMPARISON: None. HISTORY: ORDERING SYSTEM PROVIDED HISTORY: follow up ischemic stroke, AMS TECHNOLOGIST PROVIDED HISTORY: Reason for exam:->follow up ischemic stroke, AMS Has a \"code stroke\" or \"stroke alert\" been called? ->Yes What reading provider will be dictating this exam?->CRC FINDINGS: BRAIN/VENTRICLES: There is abnormal amorphous low attenuation obscuring architecture of the left cerebellar hemisphere. The features have high association with acute infarction associated with the posterior inferior cerebellar artery. There is cerebral atrophy with associated ex vacuo dilatation of the ventricular system. There is mild ill-defined low-attenuation in the periventricular white matter, corona radiata, and centrum semiovale bilaterally which has association with age related microvascular white matter ischemic disease. There is mild atherosclerotic plaque in the bilateral carotid canal, carotid siphon, and cavernous internal carotid artery (<50%). There is no acute intracranial hemorrhage, mass effect, or midline shift. No abnormal extra-axial fluid collection. There is no evidence of hydrocephalus. ORBITS: The visualized portion of the orbits demonstrate no acute abnormality. SINUSES: There is mild polypoid perimucosal thickening in the right sphenoid sinus and posterior left-sided ethmoid air cells. The visualized mastoid air cells demonstrate no acute abnormality. SOFT TISSUES/SKULL:  No acute abnormality of the visualized skull or soft tissues. Subacute infarction localized in the inferior left cerebellar hemisphere in the PICA distribution. No hemorrhage. Chronic parenchymal change including atrophy and old white matter ischemia.      IR TRANSCATH PLACE STENT UPPER W PTA INIT ARTERY    Result Date: 1/21/2023  IR GUIDED PERC TRANSLUM NONCOR ART THROMBECTOMY INIT, IR INTRACRANIAL STENT(S), IR TRANSCATH PLACE STENT UPPER W PTA INIT ARTERY PROCEDURE PERFORMED: 1. Cerebral angiogram with mechanical thrombectomy of the left extracranial and intracranial portions of the vertebral artery 2. Stent assisted complete reconstruction of the Left  V1 and V2 portions of the extracranial vertebral artery WITH distal embolic protection 3. Recurrent reocclusion of the intracranial vertebral artery status post repeat thrombectomy 4. Artery to artery emboli into the left posterior cerebral artery and the right posterior cerebral artery status post aspiration thrombectomy of the right PCA and the left PCA 5. Successful intracranial stenting ( [facility] VISION) of the V4 portion of the left vertebral artery to prevent recurrent occlusion 6. Intraoperative Ame CT Limited CT with interpretation COMPLETED DATE:  1/20/2023 5:00 PM CLINICAL HISTORY/PRE-PROCEDURE DIAGNOSIS: Acute ischemic stroke. Onset of stroke was on January 17. Patient has having recurrent posterior's circulation stroke despite maximal medical therapy. Patient was then transferred from outside hospital to here. Medical therapy maximized with heparin drip. Continues to have more cranial nerve deficits. Procedure was offered on a humanitarian basis as he is still having strokes on heparin drip POST-PROCEDURE DIAGNOSIS: Successful mechanical thrombectomy successful stent assisted reconstruction/recanalization of the left vertebral artery connecting it to the basilar artery/restoration of flow COMPARISON: CT angiographic documentation done at outside hospital images were transferred into PACS. Data imaging 1/19/2023 MRI 1/20/2023 ANESTHESIA: Conscious sedation and local anesthesia VESSELS CATHETERIZED: 1. Left vertebral artery. 2. Basilar artery. 3. Left posterior cerebral artery. 4 right posterior cerebral artery. 6. Left subclavian artery.  RADIATION EXPOSURE DATA:  1177 MG Y TOTAL FLUOROSCOPY TIME: 29 minutes and 36 seconds CONTRAST USED: 125 mL of Isovue-300 PROCEDURE: Following informed consent, the patient was brought to the angiography suite, both groins are prepped and draped in usual sterile manner. Vascular access was obtained in the right common femoral artery with a 8-Scottish sheath which was connected to continuous heparinized saline flush. A 8-Scottish cerebase catheter was prepped and with the support of a diagnostic catheter was brought into the aorta, double flushed and connected to continuous heparinized saline flush. The  guide catheter was then telescoped into the left subclavian artery. Fluoroscopic imaging performed at that time confirmed the presence of previously documented arterial occlusion. Multilevel partial thrombosis all throughout the left vertebral artery severe stenosis at the origin of the left vertebral artery/vertebral subclavian junction. Following this aspiration was initially performed through the cerebral base catheter through the Zoom suction device and then gradually a Zoom 71 was taken up over a glide advantage wire exchange length and continuous aspiration was performed wall pushing the aspiration catheter forward into the left vertebral artery. Large amount of thrombus was identified. Following this imaging was performed which showed intracranial V4 occlusion of the left vertebral artery and hence using Zoom 35 system through the Zoom 71 system serial aspiration was performed successful thrombectomy was performed until the midportion of the basilar artery. No distal embolization was noticed. On repeat imaging through the proximal vertebral artery there was really occlusion noticed in the V2 segment and hence it was decided to proceed with the stent assisted reconstruction of the entire vessel. A 5 mm spider device was then deployed into the V3 segment of the vertebral artery Patient was started on Integrilin.  Initially 1 carotid Wallstent was placed in the distal portionv2 and there is was overlap with the second portion. Again in the V1 portion and initial carotid stent was placed and in the subclavian vertebral junction there is severe stenosis and hence of fourth carotid wall stent was also placed here. After a total of 4 stents were deployed in the V1 and V2 segments angiographic documentation was repeated which showed distal occlusion in the V4 segment and hence a repeat thrombectomy was performed in the V4 segment. This resulted in artery to artery embolization into bilateral posterior cerebral arteries and the basilar tip. Aspiration thrombectomy was performed initially in the left posterior cerebral artery and was successful. Aspiration thrombectomy was then repeated in the right posterior cerebral artery multiple attempts. Following this it was decided to stent the V4 segment that keeps reoccluding and hence balloon mounted stent  3.5mm x 18 mm Multilink vision was taken over and deployed over a 014 wire that was taken into the left vertebral artery and internal taken down into the right vertebral artery for appropriate support to deployed without a intermediate catheter. The balloon mounted stent was successfully deployed at 8 claude. Angiographic documentation repeated with contrast reveals good flow throughout the left vertebral artery this was repeated again at 3 minute interval and was persistently open. Intraoperative Ame CT was obtained which was negative for any stephanie obvious intracranial hemorrhage. Patient will be maintained on Integrilin drip. Anesthesia was reversed and patient was transferred to the postoperative recovery unit in a stable condition. STROKE ACUTE TIME STAMPS: Please see nursing documentation epic INTERPRETATION OF IMAGES: 1. Left  vertebral artery injections: The common and internal carotid arteries had partial thrombosis of the V1 and V2 segment.  Complete thrombosis of the V3 segment complete thrombosis of the V4 segment with no flow contrast leading to the basilar artery. 2. Angiographic documentation post carotid Wallstent 6 mm x 22 mm deployment: Stent 1. Was deployed in the distal V2 portion of the vertebral artery post deployment no evidence of in-stent stenosis or thrombosis. 3. Angiographic documentation post carotid Wallstent 6 mm x 22 mm deployment: Stent was deployed in the mid V2 portion angiogram reveals patency of the stent 4. Angiographic documentation post carotid Wallstent 6 mm x 22 mm deployment: Stent was deployed in the proximal V2 to V1 portion. Repeat angiogram reveals that the first and second stent have occluded. 5.Angiographic documentation post carotid Wallstent 6 mm x 22 mm deployment: Stent was deployed from the V4 segment to the subclavian artery. Persistent reocclusion. 6. Angiogram post thrombectomy of the V1 V2 V3 segment successful recanalization however the distal V4 portion seems to have reoccluded. 7. Angiogram post thrombectomy of the intracranial vertebral artery: There appears to be distal embolization into the PCA. 8. Angiogram post thrombectomy of bilateral posterior cerebral arteries: Post thrombectomy images reveal satisfactory recanalization of bilateral posterior cerebral arteries 9. Angiogram post intracranial stenting with Multi-Link stent: Multi-Link stent is placed in the V4 segment no evidence of any in-stent stenosis or thrombosis. 10.Postprocedure left vertebral artery artery injections: Postprocedure all the stents are patent. Restoration of flow to the posterior circulation. No evidence of restenosis artery thrombosis. TICI 3 Intraoperative Ame CT Limited CT with interpretation: Intraoperative Ame CT Limited CT with interpretation was performed using 3-D rotational technique. Axial coronal and sagittal images were reconstructed. There were reviewed and did not show any evidence of intracranial hemorrhage. IMPRESSION 1.  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 Partial from doses of the V1 and V2 segments suspicious for vessel trauma. 2. Successful thrombectomy followed by reocclusion of the left vertebral artery both extracranial and intracranial segments. 3. Complete stent assisted reconstruction of the extracranial left vertebral artery with distal embolic protection. 4. Left posterior cerebral artery and right posterior cerebral artery thrombectomy as a result from artery to artery emboli 5. Successful rescue mechanical intracranial stenting of the V4 segment to prevent reocclusion Patients: If you have questions regarding some of the verbiage in your report, please visit [email or web address] for a definition. If you have any other questions, please contact your physician. IR INTRACRANIAL STENT(S)    Result Date: 1/21/2023  IR GUIDED PERC TRANSLUM NONCOR ART THROMBECTOMY INIT, IR INTRACRANIAL STENT(S), IR TRANSCATH PLACE STENT UPPER W PTA INIT ARTERY PROCEDURE PERFORMED: 1. Cerebral angiogram with mechanical thrombectomy of the left extracranial and intracranial portions of the vertebral artery 2. Stent assisted complete reconstruction of the Left  V1 and V2 portions of the extracranial vertebral artery WITH distal embolic protection 3. Recurrent reocclusion of the intracranial vertebral artery status post repeat thrombectomy 4. Artery to artery emboli into the left posterior cerebral artery and the right posterior cerebral artery status post aspiration thrombectomy of the right PCA and the left PCA 5. Successful intracranial stenting ( [facility] VISION) of the V4 portion of the left vertebral artery to prevent recurrent occlusion 6. Intraoperative Ame CT Limited CT with interpretation COMPLETED DATE:  1/20/2023 5:00 PM CLINICAL HISTORY/PRE-PROCEDURE DIAGNOSIS: Acute ischemic stroke. Onset of stroke was on January 17. Patient has having recurrent posterior's circulation stroke despite maximal medical therapy.  Patient was then transferred from outside hospital to here. Medical therapy maximized with heparin drip. Continues to have more cranial nerve deficits. Procedure was offered on a humanitarian basis as he is still having strokes on heparin drip POST-PROCEDURE DIAGNOSIS: Successful mechanical thrombectomy successful stent assisted reconstruction/recanalization of the left vertebral artery connecting it to the basilar artery/restoration of flow COMPARISON: CT angiographic documentation done at outside hospital images were transferred into PACS. Data imaging 1/19/2023 MRI 1/20/2023 ANESTHESIA: Conscious sedation and local anesthesia VESSELS CATHETERIZED: 1. Left vertebral artery. 2. Basilar artery. 3. Left posterior cerebral artery. 4 right posterior cerebral artery. 6. Left subclavian artery. RADIATION EXPOSURE DATA:  1177 MG Y TOTAL FLUOROSCOPY TIME: 29 minutes and 36 seconds CONTRAST USED: 125 mL of Isovue-300 PROCEDURE: Following informed consent, the patient was brought to the angiography suite, both groins are prepped and draped in usual sterile manner. Vascular access was obtained in the right common femoral artery with a 8-Estonian sheath which was connected to continuous heparinized saline flush. A 8-Estonian cerebase catheter was prepped and with the support of a diagnostic catheter was brought into the aorta, double flushed and connected to continuous heparinized saline flush. The  guide catheter was then telescoped into the left subclavian artery. Fluoroscopic imaging performed at that time confirmed the presence of previously documented arterial occlusion. Multilevel partial thrombosis all throughout the left vertebral artery severe stenosis at the origin of the left vertebral artery/vertebral subclavian junction.  Following this aspiration was initially performed through the cerebral base catheter through the Zoom suction device and then gradually a Zoom 71 was taken up over a glide advantage wire exchange length and continuous aspiration was performed wall pushing the aspiration catheter forward into the left vertebral artery. Large amount of thrombus was identified. Following this imaging was performed which showed intracranial V4 occlusion of the left vertebral artery and hence using Zoom 35 system through the Zoom 71 system serial aspiration was performed successful thrombectomy was performed until the midportion of the basilar artery. No distal embolization was noticed. On repeat imaging through the proximal vertebral artery there was really occlusion noticed in the V2 segment and hence it was decided to proceed with the stent assisted reconstruction of the entire vessel. A 5 mm spider device was then deployed into the V3 segment of the vertebral artery Patient was started on Integrilin. Initially 1 carotid Wallstent was placed in the distal portionv2 and there is was overlap with the second portion. Again in the V1 portion and initial carotid stent was placed and in the subclavian vertebral junction there is severe stenosis and hence of fourth carotid wall stent was also placed here. After a total of 4 stents were deployed in the V1 and V2 segments angiographic documentation was repeated which showed distal occlusion in the V4 segment and hence a repeat thrombectomy was performed in the V4 segment. This resulted in artery to artery embolization into bilateral posterior cerebral arteries and the basilar tip. Aspiration thrombectomy was performed initially in the left posterior cerebral artery and was successful. Aspiration thrombectomy was then repeated in the right posterior cerebral artery multiple attempts.  Following this it was decided to stent the V4 segment that keeps reoccluding and hence balloon mounted stent  3.5mm x 18 mm Multilink vision was taken over and deployed over a 014 wire that was taken into the left vertebral artery and internal taken down into the right vertebral artery for appropriate support to deployed without a intermediate catheter. The balloon mounted stent was successfully deployed at 8 claude. Angiographic documentation repeated with contrast reveals good flow throughout the left vertebral artery this was repeated again at 3 minute interval and was persistently open. Intraoperative Ame CT was obtained which was negative for any stephanie obvious intracranial hemorrhage. Patient will be maintained on Integrilin drip. Anesthesia was reversed and patient was transferred to the postoperative recovery unit in a stable condition. STROKE ACUTE TIME STAMPS: Please see nursing documentation epic INTERPRETATION OF IMAGES: 1. Left  vertebral artery injections: The common and internal carotid arteries had partial thrombosis of the V1 and V2 segment. Complete thrombosis of the V3 segment complete thrombosis of the V4 segment with no flow contrast leading to the basilar artery. 2. Angiographic documentation post carotid Wallstent 6 mm x 22 mm deployment: Stent 1. Was deployed in the distal V2 portion of the vertebral artery post deployment no evidence of in-stent stenosis or thrombosis. 3. Angiographic documentation post carotid Wallstent 6 mm x 22 mm deployment: Stent was deployed in the mid V2 portion angiogram reveals patency of the stent 4. Angiographic documentation post carotid Wallstent 6 mm x 22 mm deployment: Stent was deployed in the proximal V2 to V1 portion. Repeat angiogram reveals that the first and second stent have occluded. 5.Angiographic documentation post carotid Wallstent 6 mm x 22 mm deployment: Stent was deployed from the V4 segment to the subclavian artery. Persistent reocclusion. 6. Angiogram post thrombectomy of the V1 V2 V3 segment successful recanalization however the distal V4 portion seems to have reoccluded. 7. Angiogram post thrombectomy of the intracranial vertebral artery: There appears to be distal embolization into the PCA.  8. Angiogram post thrombectomy of bilateral posterior cerebral arteries: Post thrombectomy images reveal satisfactory recanalization of bilateral posterior cerebral arteries 9. Angiogram post intracranial stenting with Multi-Link stent: Multi-Link stent is placed in the V4 segment no evidence of any in-stent stenosis or thrombosis. 10.Postprocedure left vertebral artery artery injections: Postprocedure all the stents are patent. Restoration of flow to the posterior circulation. No evidence of restenosis artery thrombosis. TICI 3 Intraoperative Ame CT Limited CT with interpretation: Intraoperative Ame CT Limited CT with interpretation was performed using 3-D rotational technique. Axial coronal and sagittal images were reconstructed. There were reviewed and did not show any evidence of intracranial hemorrhage. IMPRESSION 1. 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 Partial from doses of the V1 and V2 segments suspicious for vessel trauma. 2. Successful thrombectomy followed by reocclusion of the left vertebral artery both extracranial and intracranial segments. 3. Complete stent assisted reconstruction of the extracranial left vertebral artery with distal embolic protection. 4. Left posterior cerebral artery and right posterior cerebral artery thrombectomy as a result from artery to artery emboli 5. Successful rescue mechanical intracranial stenting of the V4 segment to prevent reocclusion Patients: If you have questions regarding some of the verbiage in your report, please visit [email or web address] for a definition. If you have any other questions, please contact your physician.      MRI BRAIN WO CONTRAST    Result Date: 1/20/2023  EXAMINATION: MRI OF THE BRAIN WITHOUT CONTRAST  1/20/2023 11:26 am TECHNIQUE: Multiplanar multisequence MRI of the brain was performed without the administration of intravenous contrast. COMPARISON: Head CT dated 01/20/2023 and outside MRI brain dated 01/19/2023 HISTORY: ORDERING SYSTEM PROVIDED HISTORY: stroke protocol TECHNOLOGIST PROVIDED HISTORY: Reason for exam:->stroke protocol What is the sedation requirement?->None What reading provider will be dictating this exam?->CRC FINDINGS: INTRACRANIAL STRUCTURES/VENTRICLES: There is a large area of restricted diffusion in the inferior left cerebellum, as well as the left dorsal medulla, consistent with acute/subacute infarct, primarily in the left posteroinferior cerebellar artery territory. Small area of restricted diffusion again seen in the right cerebellum on series 2, image 11, consistent with acute/subacute infarct. No new area of restricted diffusion is seen. There is associated cytotoxic edema in the left inferior cerebellum with slight effacement of the overlying cortical sulci. No other area of mass effect or midline shift. Tiny focus of low signal is seen in the right ezekiel on gradient echo series 5, image 11. This was present on the prior study from 01/19/2023. It is of indeterminate age but could represent old petechial hemorrhage. Otherwise, no evidence of an acute intracranial hemorrhage. The ventricles and sulci are normal in size and configuration. The sellar/suprasellar regions appear unremarkable. The normal signal voids within the major intracranial vessels appear maintained. ORBITS: The visualized portion of the orbits demonstrate no acute abnormality. SINUSES: The visualized paranasal sinuses and mastoid air cells demonstrate no acute abnormality. There is again severe mucosal thickening in the right sphenoid sinus which is almost completely opacified and moderate mucosal thickening in the left ethmoid sinus as well as mild thickening in other sinuses. BONES/SOFT TISSUES: The bone marrow signal intensity appears normal. The soft tissues demonstrate no acute abnormality. 1. Bilateral acute/subacute infarcts including left PICA infarct and small lacunar infarct in the right cerebellum, similar compared to the prior study.  2. Probable area of old petechial hemorrhage in the right lateral ezekiel. 3. Otherwise, no new acute intracranial abnormality seen. 4. Chronic sinus disease. IR GUIDED PERC TRANSLUM ART THROMBECTOMY INIT    Result Date: 1/21/2023  IR GUIDED PERC TRANSLUM NONCOR ART THROMBECTOMY INIT, IR INTRACRANIAL STENT(S), IR TRANSCATH PLACE STENT UPPER W PTA INIT ARTERY PROCEDURE PERFORMED: 1. Cerebral angiogram with mechanical thrombectomy of the left extracranial and intracranial portions of the vertebral artery 2. Stent assisted complete reconstruction of the Left  V1 and V2 portions of the extracranial vertebral artery WITH distal embolic protection 3. Recurrent reocclusion of the intracranial vertebral artery status post repeat thrombectomy 4. Artery to artery emboli into the left posterior cerebral artery and the right posterior cerebral artery status post aspiration thrombectomy of the right PCA and the left PCA 5. Successful intracranial stenting ( MULTILINK VISION) of the V4 portion of the left vertebral artery to prevent recurrent occlusion 6. Intraoperative Ame CT Limited CT with interpretation COMPLETED DATE:  1/20/2023 5:00 PM CLINICAL HISTORY/PRE-PROCEDURE DIAGNOSIS: Acute ischemic stroke. Onset of stroke was on January 17. Patient has having recurrent posterior's circulation stroke despite maximal medical therapy. Patient was then transferred from outside hospital to here. Medical therapy maximized with heparin drip. Continues to have more cranial nerve deficits. Procedure was offered on a humanitarian basis as he is still having strokes on heparin drip POST-PROCEDURE DIAGNOSIS: Successful mechanical thrombectomy successful stent assisted reconstruction/recanalization of the left vertebral artery connecting it to the basilar artery/restoration of flow COMPARISON: CT angiographic documentation done at outside hospital images were transferred into PACS.  Data imaging 1/19/2023 MRI 1/20/2023 ANESTHESIA: Conscious sedation and local anesthesia VESSELS CATHETERIZED: 1. Left vertebral artery. 2. Basilar artery. 3. Left posterior cerebral artery. 4 right posterior cerebral artery. 6. Left subclavian artery. RADIATION EXPOSURE DATA:  1177 MG Y TOTAL FLUOROSCOPY TIME: 29 minutes and 36 seconds CONTRAST USED: 125 mL of Isovue-300 PROCEDURE: Following informed consent, the patient was brought to the angiography suite, both groins are prepped and draped in usual sterile manner. Vascular access was obtained in the right common femoral artery with a 8-Mongolian sheath which was connected to continuous heparinized saline flush. A 8-Mongolian cerebase catheter was prepped and with the support of a diagnostic catheter was brought into the aorta, double flushed and connected to continuous heparinized saline flush. The  guide catheter was then telescoped into the left subclavian artery. Fluoroscopic imaging performed at that time confirmed the presence of previously documented arterial occlusion. Multilevel partial thrombosis all throughout the left vertebral artery severe stenosis at the origin of the left vertebral artery/vertebral subclavian junction. Following this aspiration was initially performed through the cerebral base catheter through the Zoom suction device and then gradually a Zoom 71 was taken up over a glide advantage wire exchange length and continuous aspiration was performed wall pushing the aspiration catheter forward into the left vertebral artery. Large amount of thrombus was identified. Following this imaging was performed which showed intracranial V4 occlusion of the left vertebral artery and hence using Zoom 35 system through the Zoom 71 system serial aspiration was performed successful thrombectomy was performed until the midportion of the basilar artery. No distal embolization was noticed.  On repeat imaging through the proximal vertebral artery there was really occlusion noticed in the V2 segment and hence it was decided to proceed with the stent assisted reconstruction of the entire vessel. A 5 mm spider device was then deployed into the V3 segment of the vertebral artery Patient was started on Integrilin. Initially 1 carotid Wallstent was placed in the distal portionv2 and there is was overlap with the second portion. Again in the V1 portion and initial carotid stent was placed and in the subclavian vertebral junction there is severe stenosis and hence of fourth carotid wall stent was also placed here. After a total of 4 stents were deployed in the V1 and V2 segments angiographic documentation was repeated which showed distal occlusion in the V4 segment and hence a repeat thrombectomy was performed in the V4 segment. This resulted in artery to artery embolization into bilateral posterior cerebral arteries and the basilar tip. Aspiration thrombectomy was performed initially in the left posterior cerebral artery and was successful. Aspiration thrombectomy was then repeated in the right posterior cerebral artery multiple attempts. Following this it was decided to stent the V4 segment that keeps reoccluding and hence balloon mounted stent  3.5mm x 18 mm Multilink vision was taken over and deployed over a 014 wire that was taken into the left vertebral artery and internal taken down into the right vertebral artery for appropriate support to deployed without a intermediate catheter. The balloon mounted stent was successfully deployed at 8 claude. Angiographic documentation repeated with contrast reveals good flow throughout the left vertebral artery this was repeated again at 3 minute interval and was persistently open. Intraoperative Ame CT was obtained which was negative for any stephanie obvious intracranial hemorrhage. Patient will be maintained on Integrilin drip. Anesthesia was reversed and patient was transferred to the postoperative recovery unit in a stable condition. STROKE ACUTE TIME STAMPS: Please see nursing documentation epic INTERPRETATION OF IMAGES: 1. Left  vertebral artery injections: The common and internal carotid arteries had partial thrombosis of the V1 and V2 segment. Complete thrombosis of the V3 segment complete thrombosis of the V4 segment with no flow contrast leading to the basilar artery. 2. Angiographic documentation post carotid Wallstent 6 mm x 22 mm deployment: Stent 1. Was deployed in the distal V2 portion of the vertebral artery post deployment no evidence of in-stent stenosis or thrombosis. 3. Angiographic documentation post carotid Wallstent 6 mm x 22 mm deployment: Stent was deployed in the mid V2 portion angiogram reveals patency of the stent 4. Angiographic documentation post carotid Wallstent 6 mm x 22 mm deployment: Stent was deployed in the proximal V2 to V1 portion. Repeat angiogram reveals that the first and second stent have occluded. 5.Angiographic documentation post carotid Wallstent 6 mm x 22 mm deployment: Stent was deployed from the V4 segment to the subclavian artery. Persistent reocclusion. 6. Angiogram post thrombectomy of the V1 V2 V3 segment successful recanalization however the distal V4 portion seems to have reoccluded. 7. Angiogram post thrombectomy of the intracranial vertebral artery: There appears to be distal embolization into the PCA. 8. Angiogram post thrombectomy of bilateral posterior cerebral arteries: Post thrombectomy images reveal satisfactory recanalization of bilateral posterior cerebral arteries 9. Angiogram post intracranial stenting with Multi-Link stent: Multi-Link stent is placed in the V4 segment no evidence of any in-stent stenosis or thrombosis. 10.Postprocedure left vertebral artery artery injections: Postprocedure all the stents are patent. Restoration of flow to the posterior circulation. No evidence of restenosis artery thrombosis. TICI 3 Intraoperative Ame CT Limited CT with interpretation: Intraoperative Ame CT Limited CT with interpretation was performed using 3-D rotational technique.  Axial coronal and sagittal images were reconstructed. There were reviewed and did not show any evidence of intracranial hemorrhage. IMPRESSION 1. 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 Partial from doses of the V1 and V2 segments suspicious for vessel trauma. 2. Successful thrombectomy followed by reocclusion of the left vertebral artery both extracranial and intracranial segments. 3. Complete stent assisted reconstruction of the extracranial left vertebral artery with distal embolic protection. 4. Left posterior cerebral artery and right posterior cerebral artery thrombectomy as a result from artery to artery emboli 5. Successful rescue mechanical intracranial stenting of the V4 segment to prevent reocclusion Patients: If you have questions regarding some of the verbiage in your report, please visit [email or web address] for a definition. If you have any other questions, please contact your physician.        CBC with Differential:    Lab Results   Component Value Date/Time    WBC 6.3 01/22/2023 05:03 AM    RBC 3.28 01/22/2023 05:03 AM    HGB 10.1 01/22/2023 05:03 AM    HCT 28.4 01/22/2023 05:03 AM     01/22/2023 05:03 AM    MCV 86.6 01/22/2023 05:03 AM    MCH 30.8 01/22/2023 05:03 AM    MCHC 35.6 01/22/2023 05:03 AM    RDW 13.0 01/22/2023 05:03 AM    LYMPHOPCT 23.8 01/22/2023 05:03 AM    MONOPCT 8.6 01/22/2023 05:03 AM    BASOPCT 0.3 01/22/2023 05:03 AM    MONOSABS 0.54 01/22/2023 05:03 AM    LYMPHSABS 1.49 01/22/2023 05:03 AM    EOSABS 0.05 01/22/2023 05:03 AM    BASOSABS 0.02 01/22/2023 05:03 AM     CMP:    Lab Results   Component Value Date/Time     01/22/2023 05:03 AM    K 3.6 01/22/2023 05:03 AM    K 3.8 01/20/2023 04:25 AM     01/22/2023 05:03 AM    CO2 19 01/22/2023 05:03 AM    BUN 10 01/22/2023 05:03 AM    CREATININE 0.6 01/22/2023 05:03 AM    LABGLOM >60 01/22/2023 05:03 AM    GLUCOSE 92 01/22/2023 05:03 AM    PROT 6.0 01/22/2023 05:03 AM    LABALBU 3.5 01/22/2023 05:03 AM    CALCIUM 8.1 01/22/2023 05:03 AM    BILITOT 0.6 01/22/2023 05:03 AM    ALKPHOS 96 01/22/2023 05:03 AM    AST 19 01/22/2023 05:03 AM    ALT 14 01/22/2023 05:03 AM       CLINICAL ASSESMENT & PLAN:  Acute CVA left cerebellar stroke  S/P Left vertebral artery thrombectomy, left vertebral artery complete reconstruction with 5 stents. Left vertebral artery dissection  Vertical diplopia  Etoh abuse  COPD  Bradycardia  Tobacco abuse  Delirium Tremens       Repeat CT head reviewed. Echo shows no shunt. Ultrasound of lower extremity shows no DVT  Maintain SBP <160mmHg  Integrilin finished continue brilinta  Continue atorvastatin  Continue CIWA protocol, thiamine. Precedex gtt discontinued at 10am. Continue to monitor for DTs. Continue brovana, pulmicort, duonebs  GI prophylaxis, protonix    Nutrition: Diet    PPX: protonix    Lines:  peripherals    Case was discussed with care team. Continue to monitor in ICU overnight. Continue transfer in am if able to remain off precedex. This patient is unstable and critically ill. There are life and organ supporting interventions that require frequent monitoring and personal assessment. There is a high possibility of sudden, clinically significant or life-threatening deterioration in this patient's condition which may require prompt therapeutic interventions. I spent 45 independent minutes of critical care time excluding procedures.       Steven Prasad DO

## 2023-01-22 NOTE — PLAN OF CARE
Problem: Discharge Planning  Goal: Discharge to home or other facility with appropriate resources  1/22/2023 0021 by Michal Denver, RN  Outcome: Not Progressing  Flowsheets (Taken 1/21/2023 1800 by Donna Mcintyre RN)  Discharge to home or other facility with appropriate resources: Identify barriers to discharge with patient and caregiver     Problem: Chronic Conditions and Co-morbidities  Goal: Patient's chronic conditions and co-morbidity symptoms are monitored and maintained or improved  1/22/2023 1109 by Chris Tobin RN  Outcome: Progressing  1/22/2023 0021 by Michal Denver, RN  Outcome: Not Progressing  Flowsheets (Taken 1/21/2023 1800 by Donna Mcintyre RN)  Care Plan - Patient's Chronic Conditions and Co-Morbidity Symptoms are Monitored and Maintained or Improved: Monitor and assess patient's chronic conditions and comorbid symptoms for stability, deterioration, or improvement     Problem: ABCDS Injury Assessment  Goal: Absence of physical injury  1/22/2023 0021 by Michal Denver, RN  Outcome: Progressing     Problem: Safety - Adult  Goal: Free from fall injury  1/22/2023 1109 by Chris Tobin RN  Outcome: Progressing  1/22/2023 0021 by Michal Denver, RN  Outcome: Progressing     Problem: Pain  Goal: Verbalizes/displays adequate comfort level or baseline comfort level  1/22/2023 1109 by Chris Tobin RN  Outcome: Progressing  1/22/2023 0021 by Michal Denver, RN  Outcome: Progressing     Problem: Skin/Tissue Integrity  Goal: Absence of new skin breakdown  Description: 1. Monitor for areas of redness and/or skin breakdown  2. Assess vascular access sites hourly  3. Every 4-6 hours minimum:  Change oxygen saturation probe site  4. Every 4-6 hours:  If on nasal continuous positive airway pressure, respiratory therapy assess nares and determine need for appliance change or resting period.   1/22/2023 1109 by Chris Tobin RN  Outcome: Progressing  1/22/2023 0021 by Michal Denver, RN  Outcome: Progressing     Problem: Safety - Medical Restraint  Goal: Remains free of injury from restraints (Restraint for Interference with Medical Device)  Description: INTERVENTIONS:  1. Determine that other, less restrictive measures have been tried or would not be effective before applying the restraint  2. Evaluate the patient's condition at the time of restraint application  3. Inform patient/family regarding the reason for restraint  4.  Q2H: Monitor safety, psychosocial status, comfort, nutrition and hydration  1/22/2023 1109 by Mirlande Pardo RN  Outcome: Progressing  Flowsheets  Taken 1/22/2023 0600 by Cata Boyd RN  Remains free of injury from restraints (restraint for interference with medical device): Every 2 hours: Monitor safety, psychosocial status, comfort, nutrition and hydration  Taken 1/22/2023 0400 by Cata Boyd RN  Remains free of injury from restraints (restraint for interference with medical device): Every 2 hours: Monitor safety, psychosocial status, comfort, nutrition and hydration  Taken 1/22/2023 0200 by Cata Boyd RN  Remains free of injury from restraints (restraint for interference with medical device): Every 2 hours: Monitor safety, psychosocial status, comfort, nutrition and hydration  1/22/2023 0021 by Cata Boyd RN  Outcome: Progressing  Flowsheets  Taken 1/22/2023 0000 by Cata Boyd RN  Remains free of injury from restraints (restraint for interference with medical device): Every 2 hours: Monitor safety, psychosocial status, comfort, nutrition and hydration  Taken 1/21/2023 2200 by Cata Boyd RN  Remains free of injury from restraints (restraint for interference with medical device): Every 2 hours: Monitor safety, psychosocial status, comfort, nutrition and hydration  Taken 1/21/2023 2000 by Cata Boyd RN  Remains free of injury from restraints (restraint for interference with medical device): Every 2 hours: Monitor safety, psychosocial status, comfort, nutrition and hydration  Taken 1/21/2023 1445 by Shelbie Christiansen RN  Remains free of injury from restraints (restraint for interference with medical device): Every 2 hours: Monitor safety, psychosocial status, comfort, nutrition and hydration     Problem: Discharge Planning  Goal: Discharge to home or other facility with appropriate resources  1/22/2023 0021 by Avi Palacios RN  Outcome: Not Progressing  Flowsheets (Taken 1/21/2023 1800 by Edwian Freeman RN)  Discharge to home or other facility with appropriate resources: Identify barriers to discharge with patient and caregiver     Problem: Chronic Conditions and Co-morbidities  Goal: Patient's chronic conditions and co-morbidity symptoms are monitored and maintained or improved  1/22/2023 1109 by Genoveva May RN  Outcome: Progressing  1/22/2023 0021 by Avi Palacios RN  Outcome: Not Progressing  Flowsheets (Taken 1/21/2023 1800 by Edwina Freeman RN)  Care Plan - Patient's Chronic Conditions and Co-Morbidity Symptoms are Monitored and Maintained or Improved: Monitor and assess patient's chronic conditions and comorbid symptoms for stability, deterioration, or improvement

## 2023-01-23 LAB
ALBUMIN SERPL-MCNC: 3.4 G/DL (ref 3.5–5.2)
ALP BLD-CCNC: 94 U/L (ref 40–129)
ALT SERPL-CCNC: 17 U/L (ref 0–40)
ANION GAP SERPL CALCULATED.3IONS-SCNC: 12 MMOL/L (ref 7–16)
AST SERPL-CCNC: 22 U/L (ref 0–39)
BASOPHILS ABSOLUTE: 0.01 E9/L (ref 0–0.2)
BASOPHILS RELATIVE PERCENT: 0.2 % (ref 0–2)
BILIRUB SERPL-MCNC: 0.6 MG/DL (ref 0–1.2)
BUN BLDV-MCNC: 6 MG/DL (ref 6–23)
CALCIUM IONIZED: 1.19 MMOL/L (ref 1.15–1.33)
CALCIUM SERPL-MCNC: 8.2 MG/DL (ref 8.6–10.2)
CHLORIDE BLD-SCNC: 103 MMOL/L (ref 98–107)
CO2: 20 MMOL/L (ref 22–29)
CREAT SERPL-MCNC: 0.6 MG/DL (ref 0.7–1.2)
EOSINOPHILS ABSOLUTE: 0.05 E9/L (ref 0.05–0.5)
EOSINOPHILS RELATIVE PERCENT: 1 % (ref 0–6)
GFR SERPL CREATININE-BSD FRML MDRD: >60 ML/MIN/1.73
GLUCOSE BLD-MCNC: 101 MG/DL (ref 74–99)
HCT VFR BLD CALC: 27.6 % (ref 37–54)
HEMOGLOBIN: 9.7 G/DL (ref 12.5–16.5)
IMMATURE GRANULOCYTES #: 0.01 E9/L
IMMATURE GRANULOCYTES %: 0.2 % (ref 0–5)
LYMPHOCYTES ABSOLUTE: 1.16 E9/L (ref 1.5–4)
LYMPHOCYTES RELATIVE PERCENT: 22.7 % (ref 20–42)
MAGNESIUM: 1.9 MG/DL (ref 1.6–2.6)
MCH RBC QN AUTO: 30.7 PG (ref 26–35)
MCHC RBC AUTO-ENTMCNC: 35.1 % (ref 32–34.5)
MCV RBC AUTO: 87.3 FL (ref 80–99.9)
MONOCYTES ABSOLUTE: 0.5 E9/L (ref 0.1–0.95)
MONOCYTES RELATIVE PERCENT: 9.8 % (ref 2–12)
NEUTROPHILS ABSOLUTE: 3.37 E9/L (ref 1.8–7.3)
NEUTROPHILS RELATIVE PERCENT: 66.1 % (ref 43–80)
PDW BLD-RTO: 13.1 FL (ref 11.5–15)
PHOSPHORUS: 3.5 MG/DL (ref 2.5–4.5)
PLATELET # BLD: 243 E9/L (ref 130–450)
PMV BLD AUTO: 9 FL (ref 7–12)
POC ACT LR: 180 SECONDS
POTASSIUM SERPL-SCNC: 3.6 MMOL/L (ref 3.5–5)
RBC # BLD: 3.16 E12/L (ref 3.8–5.8)
SODIUM BLD-SCNC: 135 MMOL/L (ref 132–146)
TOTAL PROTEIN: 5.8 G/DL (ref 6.4–8.3)
WBC # BLD: 5.1 E9/L (ref 4.5–11.5)

## 2023-01-23 PROCEDURE — 94640 AIRWAY INHALATION TREATMENT: CPT

## 2023-01-23 PROCEDURE — 82330 ASSAY OF CALCIUM: CPT

## 2023-01-23 PROCEDURE — 6370000000 HC RX 637 (ALT 250 FOR IP): Performed by: PSYCHIATRY & NEUROLOGY

## 2023-01-23 PROCEDURE — 6370000000 HC RX 637 (ALT 250 FOR IP)

## 2023-01-23 PROCEDURE — 97162 PT EVAL MOD COMPLEX 30 MIN: CPT

## 2023-01-23 PROCEDURE — 2580000003 HC RX 258: Performed by: PSYCHIATRY & NEUROLOGY

## 2023-01-23 PROCEDURE — 2580000003 HC RX 258

## 2023-01-23 PROCEDURE — 97530 THERAPEUTIC ACTIVITIES: CPT

## 2023-01-23 PROCEDURE — 84100 ASSAY OF PHOSPHORUS: CPT

## 2023-01-23 PROCEDURE — 85025 COMPLETE CBC W/AUTO DIFF WBC: CPT

## 2023-01-23 PROCEDURE — 6360000002 HC RX W HCPCS

## 2023-01-23 PROCEDURE — 2000000000 HC ICU R&B

## 2023-01-23 PROCEDURE — 83735 ASSAY OF MAGNESIUM: CPT

## 2023-01-23 PROCEDURE — 6370000000 HC RX 637 (ALT 250 FOR IP): Performed by: INTERNAL MEDICINE

## 2023-01-23 PROCEDURE — 80053 COMPREHEN METABOLIC PANEL: CPT

## 2023-01-23 RX ORDER — LANOLIN ALCOHOL/MO/W.PET/CERES
100 CREAM (GRAM) TOPICAL DAILY
Status: DISCONTINUED | OUTPATIENT
Start: 2023-01-23 | End: 2023-01-26 | Stop reason: HOSPADM

## 2023-01-23 RX ORDER — FOLIC ACID 1 MG/1
1 TABLET ORAL DAILY
Status: DISCONTINUED | OUTPATIENT
Start: 2023-01-23 | End: 2023-01-26 | Stop reason: HOSPADM

## 2023-01-23 RX ORDER — MAGNESIUM SULFATE 1 G/100ML
1000 INJECTION INTRAVENOUS ONCE
Status: COMPLETED | OUTPATIENT
Start: 2023-01-23 | End: 2023-01-23

## 2023-01-23 RX ADMIN — TICAGRELOR 90 MG: 90 TABLET ORAL at 09:57

## 2023-01-23 RX ADMIN — SODIUM CHLORIDE, PRESERVATIVE FREE 10 ML: 5 INJECTION INTRAVENOUS at 09:58

## 2023-01-23 RX ADMIN — Medication 1 TABLET: at 09:58

## 2023-01-23 RX ADMIN — Medication 100 MG: at 09:58

## 2023-01-23 RX ADMIN — Medication 10 ML: at 10:00

## 2023-01-23 RX ADMIN — FOLIC ACID 1 MG: 1 TABLET ORAL at 09:58

## 2023-01-23 RX ADMIN — TICAGRELOR 90 MG: 90 TABLET ORAL at 19:57

## 2023-01-23 RX ADMIN — ARFORMOTEROL TARTRATE 15 MCG: 15 SOLUTION RESPIRATORY (INHALATION) at 08:30

## 2023-01-23 RX ADMIN — SODIUM CHLORIDE, PRESERVATIVE FREE 10 ML: 5 INJECTION INTRAVENOUS at 19:58

## 2023-01-23 RX ADMIN — BUDESONIDE 500 MCG: 0.5 SUSPENSION RESPIRATORY (INHALATION) at 20:20

## 2023-01-23 RX ADMIN — MAGNESIUM SULFATE IN DEXTROSE 1000 MG: 10 INJECTION, SOLUTION INTRAVENOUS at 06:29

## 2023-01-23 RX ADMIN — ASPIRIN 81 MG CHEWABLE TABLET 81 MG: 81 TABLET CHEWABLE at 09:58

## 2023-01-23 RX ADMIN — BUDESONIDE 500 MCG: 0.5 SUSPENSION RESPIRATORY (INHALATION) at 08:30

## 2023-01-23 RX ADMIN — ARFORMOTEROL TARTRATE 15 MCG: 15 SOLUTION RESPIRATORY (INHALATION) at 20:20

## 2023-01-23 RX ADMIN — PANTOPRAZOLE SODIUM 40 MG: 40 TABLET, DELAYED RELEASE ORAL at 06:08

## 2023-01-23 RX ADMIN — POTASSIUM BICARBONATE 40 MEQ: 782 TABLET, EFFERVESCENT ORAL at 06:26

## 2023-01-23 RX ADMIN — ATORVASTATIN CALCIUM 80 MG: 80 TABLET, FILM COATED ORAL at 19:58

## 2023-01-23 ASSESSMENT — PAIN SCALES - GENERAL
PAINLEVEL_OUTOF10: 0

## 2023-01-23 NOTE — PROGRESS NOTES
Physical Therapy    Physical Therapy Initial Assessment     Name: Royer Tnieo  : 1955  MRN: [id number]      Date of Service: 2023    Evaluating PT:  Rufina De Paz, PT, DPT  [id number]     Room #:  8173/3368-V  Diagnosis:  Acute ischemic stroke [facility]) [I63.9]  PMHx/PSHx:   has no past medical history on file. Procedure/Surgery:  Left vertebral artery occlusion s/p thrombectomy and stent per Neuro IR   Precautions:  Falls, L coordination deficits, L visual field disturbance   Equipment Needs:  TBD    SUBJECTIVE:    Pt lives alone with his dog in a 2 story home with 2 stairs and 0 rail to enter. Bedroom is on the 1st level and bathroom is on the 2n level with full flight of steps and rail. Pt ambulated with no AD and independent PTA. OBJECTIVE:   Initial Evaluation  Date: 23 Treatment Short Term/ Long Term   Goals   AM-PAC 6 Clicks 23/15     Was pt agreeable to Eval/treatment? Yes      Does pt have pain? No c/o pain      Bed Mobility  Rolling: NT  Supine to sit: NT  Sit to supine: Mod A  Scooting: Mod A  Rolling: SBA  Supine to sit: SBA  Sit to supine: SBA  Scooting: SBA   Transfers Sit to stand: Max A  Stand to sit: Max A  Stand pivot: Max A with no AD  Sit to stand: SBA  Stand to sit: SBA  Stand pivot: Min A with AAD   Ambulation    Few feet with FWW Max A > Max A x2 with LOB   >50 feet with AAD Min A   Stair negotiation: ascended and descended  NT  > 2 steps with B rail Min A   ROM BUE:  WFL  BLE:  WFL     Strength BUE:  grossly 4+/5  BLE:  grossly 5/5     Balance Sitting EOB:  SBA  Dynamic Standing:   Max A   Sitting EOB:  SBA  Dynamic Standing:  Min A     Pt is A & O x 4  RASS:  0  CAM-ICU:  NT  Sensation:  Pt denies numbness and tingling to extremities  Edema:  Unremarkable  Coordination:  LUE dysmetria noted    Vitals:  Blood Pressure at rest 144/92 mmHg  Blood Pressure post session 134/87 mmHg   Heart Rate at rest 75 bpm  Heart Rate post session 76 bpm    SPO2 at rest 97% on RA SPO2 post session 97% on RA       Therapeutic Exercises:    BLE AROM  STS x3 reps     Patient education  Pt educated on PT role, safety during functional mobility, sitting and standing balance/posture, PT POC and PT prognosis. Pt's family educated on pt performance, PT POC, PT prognosis, discharge reccs     Patient response to education:   Pt verbalized understanding Pt demonstrated skill Pt requires further education in this area   Yes  Yes  Reinforce      ASSESSMENT:    Conditions Requiring Skilled Therapeutic Intervention:    []Decreased strength     []Decreased ROM  [x]Decreased functional mobility  [x]Decreased balance   [x]Decreased endurance   [x]Decreased posture  []Decreased sensation  [x]Decreased coordination   [x]Decreased vision  [x]Decreased safety awareness   []Increased pain       Comments:  Pt received sitting at EOB and agreeable to PT evaluation. Pt cleared for participation by RN prior to session. Vitals monitored during session. Pt getting up to EOB on entry stating he needed to use urinal with bed alarm going off. Pt assisted and educated on safety regarding multiple medical lines and balance deficits. Pt assisted at EOB with standing and using urinal.  Pt with significant L side lean in sitting EOB. After completing using urinal pt returned to sitting EOB and then complete STS with stand pivot bed>Chair. PT given time to sit in chair. Assisted pt with donning new pants and gown. Pt completed STS from chair with assistance to ambulate. Pt had significant LOB with severe dizziness requiring pt to be lowered safely back to chair by RN and PT. Pt was then assisted with STS and pivot transfer back to bed d/t dizziness while seated in chair. Positioned in chair position. Had lengthy discussion with pt and his family about PT recommendations and PT POC. Pt left with call button in reach, lines attached, and needs met.     Pt demonstrates balance and coordination deficits that would be best addressed in an intensive rehab setting. Pt demonstrates good rehab potential.      Treatment:  Patient practiced and was instructed in the following treatment:    Bed mobility training - pt given verbal and tactile cues to facilitate proper sequencing and safety during sit>supine as well as provided with physical assistance to complete task    STS and pivot transfer training - pt educated on proper hand and foot placement, safety and sequencing, and use of FWW vs No AD to safely complete sit<>stand and pivot transfers with hands on assistance to complete task safely    Gait training- pt was given verbal and tactile cues to facilitate safety/balance during ambulation as well as provided with physical assistance. Pt's/ family goals   1. Rehab     Prognosis is good for reaching above PT goals. Patient and or family understand(s) diagnosis, prognosis, and plan of care.   yes    PHYSICAL THERAPY PLAN OF CARE:    PT POC is established based on physician order and patient diagnosis     Referring provider/PT Order:  FLAVIA Villela - CNP / PT eval and treat   Diagnosis:  Acute ischemic stroke [facility]) [I63.9]  Specific instructions for next treatment:  progress transfers and gait, recommend two person assistance for gait safety d/t dizziness and LOB    Current Treatment Recommendations:     [x] Strengthening to improve independence with functional mobility   [] ROM to improve independence with functional mobility   [x] Balance Training to improve static/dynamic balance and to reduce fall risk  [x] Endurance Training to improve activity tolerance during functional mobility   [x] Transfer Training to improve safety and independence with all functional transfers   [x] Gait Training to improve gait mechanics, endurance and asses need for appropriate assistive device  [x] Stair Training in preparation for safe discharge home and/or into the community   [x] Positioning to prevent skin breakdown and contractures  [x] Safety and Education Training   [x] Patient/Caregiver Education   [x] HEP  [] Other     PT long term treatment goals are located in above grid    Frequency of treatments: 2-5x/week x 1-2 weeks. Time in  0915  Time out  1000    Total Treatment Time  25 minutes     Evaluation Time includes thorough review of current medical information, gathering information on past medical history/social history and prior level of function, completion of standardized testing/informal observation of tasks, assessment of data and education on plan of care and goals.     CPT codes:  [] Low Complexity PT evaluation 53911  [x] Moderate Complexity PT evaluation 36035  [] High Complexity PT evaluation 95461  [] PT Re-evaluation 14668  [] Gait training 99676 -- minutes  [] Manual therapy 29769 -- minutes  [x] Therapeutic activities 88212 25 minutes  [] Therapeutic exercises 73799 - minutes  [] Neuromuscular reeducation 86270 -- minutes     Phillip Crews, PT, DPT  [id number]

## 2023-01-23 NOTE — PLAN OF CARE
Problem: Safety - Medical Restraint  Goal: Remains free of injury from restraints (Restraint for Interference with Medical Device)  Description: INTERVENTIONS:  1. Determine that other, less restrictive measures have been tried or would not be effective before applying the restraint  2. Evaluate the patient's condition at the time of restraint application  3. Inform patient/family regarding the reason for restraint  4.  Q2H: Monitor safety, psychosocial status, comfort, nutrition and hydration  1/22/2023 1912 by Pablo Arambula RN  Outcome: Completed  1/22/2023 1109 by Zachary Golden RN  Outcome: Progressing  Flowsheets  Taken 1/22/2023 0600 by Radha Kerr RN  Remains free of injury from restraints (restraint for interference with medical device): Every 2 hours: Monitor safety, psychosocial status, comfort, nutrition and hydration  Taken 1/22/2023 0400 by Radha Kerr RN  Remains free of injury from restraints (restraint for interference with medical device): Every 2 hours: Monitor safety, psychosocial status, comfort, nutrition and hydration  Taken 1/22/2023 0200 by Radha Kerr RN  Remains free of injury from restraints (restraint for interference with medical device): Every 2 hours: Monitor safety, psychosocial status, comfort, nutrition and hydration

## 2023-01-23 NOTE — PROGRESS NOTES
Speech Language Pathology      NAME:  Fermin Hale  :  1955  DATE: 2023  ROOM:  80/80-A    Spoke with RN, aware that SLP is recommend an MBSS.      [facility] [I[facility].9]

## 2023-01-23 NOTE — CONSULTS
[facility]                  [address] [facility], [address]                                  CONSULTATION    PATIENT NAME: Lloyd Briot                    :        1955  MED REC NO:   [id number]                            ROOM:       [facility]  ACCOUNT NO:   [de-identified]                           ADMIT DATE: 2023  PROVIDER:     Chad Rm MD    CONSULT DATE:  2023    AGE:  78. SEX:  Male. CHIEF COMPLAINT:  Cerebellar CVA. HISTORY OF PRESENT ILLNESS:  The patient was admitted to [address] [place] [address]  on 2023. He was found to have a thrombosed left vertebral artery  with dissection. He underwent thrombectomy and multiple stents to  reconstruct the vessel. He tolerated those procedures well, but does  have a residual cerebellar CVA. He is currently in ICU being monitored  following these procedures. He has not yet been evaluated by Therapy  because when they went to see him, he was out getting further  procedures. I suspect from my eval that he is probably going to be at  about a moderate assist level with his functioning when they do see him  most likely. PAST MEDICAL HISTORY:  No prior medical problems that I am aware of  other than some underlying COPD and smoking. ALLERGIES:  None known. CURRENT MEDICATIONS:  Brovana, aspirin, Lipitor, Pulmicort, Protonix,  potassium, and Brilinta. SOCIAL HISTORY:  He lives alone but does have a sister that can provide  some assistance at discharge. REVIEW OF SYSTEMS:  HEENT:  Negative for prior HEENT problems. CARDIAC:  Negative for prior cardiac problems. PULMONARY:  Positive for COPD. GI  AND :  Negative. ORTHOPEDIC:  Negative. NEUROLOGIC:  Positive for  his current deficits. PHYSICAL EXAMINATION:  GENERAL:  Well-nourished, well-developed white male, in no acute  distress. HEENT:  Head:  Normocephalic, atraumatic.   Eyes:  Pupils equal, round,  reactive to light.  Pharynx:  Normal.  LUNGS:  Clear to P and A. HEART:  Regular rate and rhythm. S1, S2 normal.  No murmurs or gallops. ABDOMEN:  Bowel sounds normal.  Soft, nontender. No masses or  organomegaly. EXTREMITIES:  Without clubbing, cyanosis or edema. MENTAL STATUS:  Alert and oriented, but he does have a long response  time. SENSATION:  Intact. NEUROMUSCULAR:  4- strength with some impaired coordination and  ataxic-type movement. PROBLEM LIST:  1. Cerebellar CVA. 2.  Thrombosed and dissected vertebral artery. 3.  Status post thrombectomy and stenting. 4.  COPD.  5.  Nicotine addiction. 6.  Alcohol use disorder. RECOMMENDATIONS:  I think the patient is probably going to be a good  candidate for acute rehab based on what I am seeing on bedside eval.  I  think he is going to require further rehab. Therapy will probably see  him later today and we will go from there to address pre-cert once the  interventional physicians feel he is stable to leave ICU considering the  extensive reconstruction.         Wayne Blankenship MD    D: 01/23/2023 12:15:16       T: 01/23/2023 12:18:59     [email or web address]  Job#: [id number]     Doc#: [id number]    CC:

## 2023-01-23 NOTE — PROGRESS NOTES
[facility]   Progress note      Subjective:    Looks well  No other acute issues or complaints  Following commands answering questions  Objective:    /77   Pulse 71   Temp 98.2 °F (36.8 °C)   Resp 17   Ht 6' (1.829 m)   Wt 150 lb (68 kg)   SpO2 96%   BMI 20.34 kg/m²     In: 3313.1 [P.O.:450; I.V.:2345.6]  Out: 1930   In: 3313.1   Out: 1930 [Urine:1930]    General appearance: NAD, conversant and answering questions appropriately  HEENT: AT/NC, MMM  Neck: FROM, supple  Lungs: Clear to auscultation  CV: RRR, no MRGs  Vasc: Radial pulses 2+  Abdomen: Soft, non-tender; no masses or HSM  Extremities: No peripheral edema or digital cyanosis  Skin: no rash, lesions or ulcers    Recent Labs     01/21/23  0403 01/22/23  0503 01/23/23  0435   WBC 6.4 6.3 5.1   HGB 11.8* 10.1* 9.7*   HCT 33.6* 28.4* 27.6*    247 243       Recent Labs     01/21/23  0931 01/22/23  0503 01/23/23  0435    137 135   K 3.9 3.6 3.6   CL 99 105 103   CO2 19* 19* 20*   BUN 12 10 6   CREATININE 0.6* 0.6* 0.6*   CALCIUM 8.9 8.1* 8.2*       Assessment:    Principal Problem:    Cerebellar stroke (HCC)  Active Problems:    Vertebral artery dissection (HCC)    Chronic obstructive pulmonary disease (HCC)    Sinus bradycardia  Resolved Problems:    * No resolved hospital problems.  *      Plan:    61-year-old male who developed slurred speech and difficulty walking presented to an outside facility was diagnosed with subacute infarct and was transferred to [address] [place] [address] MICU with     Acute ischemic cerebellar stroke  Patient agitated and confused and really not able to provide much history  MRA/CTA from [place]-thrombus left vertebral artery dissection versus left vertebral artery thromboembolism  Low-dose heparin drip-with titration parameters  1/20/2023-underwent extensive reconstruction, thrombectomy and vertebral artery stenting-please electronic medical  Radiology's procedure notes below  ASA and Brilinta, Integrilin drip has been initiated   high intensity statin, blood pressure control  Swallow study once patient able to tolerate  Echocardiogram - pending  Ultrasound bilateral lower extremity-rule out DVT  IV hydration  Smoking cessation  Neurology and neuro interventional radiology following     Bradycardia of unknown etiology  Hold offending agents  Keep atropine at bedside  Check lipid panel  Consult EP,-await input possible pacemaker placement    1/21/2023  In four-point restraints today-soft bilaterally  Agitated confused unable to provide much of the history  Echocardiogram completed with ejection fraction 60 to 65%-no evidence of shunt or PFO  Head CT to be completed if patient can tolerate without being agitated-increase Precedex for this  Case discussed with Dr. Ирина Ramon radiology, EP, vascular, critical care team all on board  Consider phenobarbital for severe alcohol withdrawal symptoms use  For now he is okay on Precedex  Remains on off Cardene, Integrilin, drips  Continue dual antiplatelet therapy with Brilinta and aspirin  Medicine will continue to follow    1/22/2023  Out of restraints  Following commands  Continue to monitor mentation  Remains on dual antiplatelet therapy due to placement of vertebral reconstruction with stents, high intensity statin  Off Integrilin  Watch for signs of bleeding  No significant symptoms of alcohol withdrawals, if any ensue would use phenobarbital or Ativan  Okay to transfer out of ICU setting from medicine standpoint  Neurology to continue to follow along with neuro interventional radiology    1/23/2023  No acute events overnight  Remains restraint free  Following commands and answering questions appropriately  Okay for transfer out of ICU from medicine standpoint  Continue Brilinta and aspirin, high intensity statin, BP control  Monitor H&H-9.7/27 today  Initiate PT OT once out of ICU    Code Status: Full code  Consultants: Neurology, neuro interventional radiology, ICU team  DVT Prophylaxis   PT/OT  Discharge planning           Nguyễn Mcdaniel MD  4:10 PM  1/23/2023

## 2023-01-23 NOTE — ACP (ADVANCE CARE PLANNING)
Advance Care Planning   Healthcare Decision Maker:    Primary Decision Maker: Margoth Negrete - Brother/Sister - [phone]    Secondary Decision Maker: Juany Cloud - Parent - [phone]    Click here to complete Healthcare Decision Makers including selection of the Healthcare Decision Maker Relationship (ie \"Primary\").

## 2023-01-23 NOTE — PROGRESS NOTES
[address]             Pulmonary & Critical Care Medicine                MICU Progress Note                 Written by: Stefanie Estrada MD  Name: Nora Esqueda : 1955       Age: 78 y.o. MR/Act #    : [id number],  Billing  #    : [id number]   Admit Date: 2023 12:29 AM LOS: 3,   Hospital Day: 4 Room #      : [id number]   PCP            : Yenny Mead MD,   Referred by: Randy Contreras MD ICU Attending: MD Eladia Payne date: 2023 11:18 AM   ICU Days:       4 Vent Days:   0 LOS: 3,                          Reason for ICU admission         Cerebellar Stroke                         Brief HPI, Presentation & Synopsis                       78 y.o. male who was admitted to [facility] after transferred from an outside facility after presenting with difficulty walking, garbled speech and a facial droop. 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 S/p thrombectomy and complete reconstruction of vertebral artery and placement of 5 stents. Patient was placed on CIWA protocol and was found to have alcohol withdrawal.  He was transiently on Precedex drip now off Precedex drip.   Admitted in MICU for vertebral artery dissection, cerebral stroke and alcohol withdrawal.  Active problem list of yesterday:  Active Hospital Problems    Diagnosis Date Noted    Sinus bradycardia [R00.1] 2023     Priority: Medium    Cerebellar stroke ([address] [I63.9] 2023     Priority: Medium    Vertebral artery dissection ([address] [I77.74] 2023     Priority: Medium    Chronic obstructive pulmonary disease ([address] [J44.9] 2023     Priority: Medium                           Events of last night                      No overnight events                      Subjective   2023               Afebrile, hemodynamically stable                      Objective  2023               Vitals:    23 1000   BP: 136/87   Pulse: 79 Resp: 16   Temp: 98 °F (36.7 °C)   SpO2: 96%     Temperature range : Temp  Av °F (36.7 °C)  Min: 97.7 °F (36.5 °C)  Max: 98.2 °F (36.8 °C)  Pulse rate range      : Pulse  Av.3  Min: 68  Max: 97  Respiration range   : Resp  Av.4  Min: 10  Max: 21  BP range                  : Systolic (52LET), FQP:830 , Min:94 , LP   ; Diastolic (64BTH), YDM:65, Min:59, Max:98    Pulse Ox range : SpO2  Av.1 %  Min: 95 %  Max: 98 %    Arterial BP       Systolic BP/Diastolic BP & MAP   ABP (Arterial line BP): (!) 300/300   ABP Mean (Arterial Line Mean): 300 mmHg  I/O - 24hr    Intake/Output Summary (Last 24 hours) at 2023 1118  Last data filed at 2023 1000  Gross per 24 hour   Intake 3219.06 ml   Output 1550 ml   Net 1669.06 ml     BMI: Body mass index is 20.34 kg/m². WEIGHT:  Patient Vitals for the past 96 hrs (Last 3 readings):   Weight   23 0600 150 lb (68 kg)   23 0045 158 lb (71.7 kg)     Wt Readings from Last 2 Encounters:   23 150 lb (68 kg)       Weight change:     Physical Examination  1. General: Well-nourished well-developed. Currently with no distress. 2. Eyes: Vision - grossly normal, PERRLA   3. HENT: Head is atraumatic & normocephalic. Neck Supple, No jugular venous distention   4. Respiratory: Lungs are clear to auscultation, Respirations are non-labored, Breath sounds are equal   5. Cardiovascular: S1, S2 normal, Regular rate & rhythm, No murmur, No pedal edema   6. Gastrointestinal: Soft, Non-tender, Non-distended, Normal bowel sounds. No organomegaly. 7. Neurologic: Continues to have dysmetria of left hand. Denies blurred vision at this time. Face symmetrical.  8. Skin: no rashes, breakdown  9. Musculoskeletal: no LE edema, no joint effusion.   10. Psychiatric -calm and cooperative           Medications, Allergies, Nutrition, Immunizations                    Continuous Infusions:   sodium chloride       Scheduled Meds:    thiamine  100 mg Oral Daily folic acid  1 mg Oral Daily    aspirin  81 mg Oral Daily    pantoprazole  40 mg Oral QAM AC    atorvastatin  80 mg Oral Nightly    sodium chloride flush  5-40 mL IntraVENous 2 times per day    multivitamin  1 tablet Oral Daily    arformoterol tartrate  15 mcg Nebulization BID    budesonide  0.5 mg Nebulization BID    sodium chloride flush  5-40 mL IntraVENous 2 times per day    ticagrelor  90 mg Oral BID      Current Facility-Administered Medications   Medication Dose Route Frequency Provider Last Rate Last Admin    thiamine tablet 100 mg  100 mg Oral Daily Becca Cruz, APRN - CNP   100 mg at 33/28/80 0071    folic acid (FOLVITE) tablet 1 mg  1 mg Oral Daily Becca Cruz, APRN - CNP   1 mg at 01/23/23 3048    aspirin chewable tablet 81 mg  81 mg Oral Daily Lindsey JOSE ALEJANDRO Howsare, DO   81 mg at 01/23/23 0958    pantoprazole (PROTONIX) tablet 40 mg  40 mg Oral QAM AC Lindsey Thayersare, DO   40 mg at 01/23/23 6569    ondansetron (ZOFRAN-ODT) disintegrating tablet 4 mg  4 mg Oral Q8H PRN Becca Cruz, APRN - CNP        Or    ondansetron (ZOFRAN) injection 4 mg  4 mg IntraVENous Q6H PRN Becca Cruz, APRN - CNP        polyethylene glycol (GLYCOLAX) packet 17 g  17 g Oral Daily PRN Becca Cruz, APRN - CNP        perflutren lipid microspheres (DEFINITY) injection 1.5 mL  1.5 mL IntraVENous ONCE PRN Becca Cruz, APRN - CNP        atorvastatin (LIPITOR) tablet 80 mg  80 mg Oral Nightly Becca Cruz, APRN - CNP   80 mg at 01/22/23 2008    hydrALAZINE (APRESOLINE) injection 10 mg  10 mg IntraVENous Q6H PRN Becca Cruz, APRN - CNP   10 mg at 01/20/23 2325    sodium chloride flush 0.9 % injection 5-40 mL  5-40 mL IntraVENous 2 times per day Becca Cruz, APRN - CNP   10 mL at 01/23/23 1000    sodium chloride flush 0.9 % injection 5-40 mL  5-40 mL IntraVENous PRN Becca Cruz, APRN - CNP   10 mL at 01/20/23 1319    multivitamin 1 tablet  1 tablet Oral Daily Giselle FLAVIA Fields - CNP   1 tablet at 01/23/23 0958    arformoterol tartrate (BROVANA) nebulizer solution 15 mcg  15 mcg Nebulization BID Bin RuvalcabaFLAVIA CNP   15 mcg at 01/23/23 0830    budesonide (PULMICORT) nebulizer suspension 500 mcg  0.5 mg Nebulization BID Bin RuvalcabaFLAVIA CNP   500 mcg at 01/23/23 0830    sodium chloride flush 0.9 % injection 5-40 mL  5-40 mL IntraVENous 2 times per day Rolando Tariq MD   10 mL at 01/23/23 0958    sodium chloride flush 0.9 % injection 5-40 mL  5-40 mL IntraVENous PRN Rolando Tariq MD        0.9 % sodium chloride infusion   IntraVENous PRN Rolando Tariq MD        acetaminophen (TYLENOL) tablet 650 mg  650 mg Oral Q4H PRN Rolando Tariq MD        ticagrelor (BRILINTA) tablet 90 mg  90 mg Oral BID Rolando Tariq MD   90 mg at 01/23/23 0957     PRN Meds      : ondansetron **OR** ondansetron, polyethylene glycol, perflutren lipid microspheres, hydrALAZINE, sodium chloride flush, sodium chloride flush, sodium chloride, acetaminophen  Nutrition         : Continue diet  Allergy            : Patient has no known allergies. Immunization      :   There is no immunization history on file for this patient.           Labs and Imaging Studies                  CBC  :  Recent Labs     01/21/23  0403 01/22/23  0503 01/23/23  0435   WBC 6.4 6.3 5.1   RBC 3.87 3.28* 3.16*   HGB 11.8* 10.1* 9.7*   HCT 33.6* 28.4* 27.6*   MCV 86.8 86.6 87.3   MCH 30.5 30.8 30.7   MCHC 35.1* 35.6* 35.1*   RDW 13.2 13.0 13.1    247 243   MPV 9.1 8.9 9.0     Comprehensive :   Recent Labs     01/21/23  0931 01/22/23  0503 01/23/23  0435    137 135   K 3.9 3.6 3.6   CL 99 105 103   CO2 19* 19* 20*   BUN 12 10 6   CREATININE 0.6* 0.6* 0.6*   GLUCOSE 108* 92 101*   CALCIUM 8.9 8.1* 8.2*   PROT 6.9 6.0* 5.8*   LABALBU 4.1 3.5 3.4*   BILITOT 0.5 0.6 0.6   ALKPHOS 110 96 94   AST 16 19 22   ALT 13 14 17     Cardiac : No results found for: CKTOTAL, CKMB, CKMBINDEX, TROPONINI  Lab Results   Component Value Date    PROBNP 650 (H) 01/20/2023     PRO-BNP : No results for input(s): BNP in the last 72 hours.   BNP: No results for input(s): BNP in the last 72 hours.  Lactic Acid : @BRIEFLAB(LACTA:3)    Lipase  : No results for input(s): LIPASE in the last 72 hours.    Sed rate : No results found for: SEDRATE  CReactive Protein: No results found for: CRP  Globulins :   No results for input(s): IGG in the last 72 hours.  No results for input(s): IGM in the last 72 hours.  No results for input(s): IGA in the last 72 hours.  No results for input(s): IGG1 in the last 72 hours.  No results for input(s): IGG2 in the last 72 hours.  No results for input(s): IGG3 in the last 72 hours.  No results for input(s): IGG4 in the last 72 hours.       Magnesium  Lab Results   Component Value Date/Time    MG 1.9 01/23/2023 04:35 AM     Phosphorus  Lab Results   Component Value Date/Time    PHOS 3.5 01/23/2023 04:35 AM     Ionized Calcium  Lab Results   Component Value Date/Time    CAION 1.19 01/23/2023 04:35 AM        COVID-19 Labs:  Lab Results   Component Value Date/Time    COVID19 Not Detected 01/20/2023 04:25 AM       Notable Cultures:    Blood cultures No results found for: BC No results for input(s): BC in the last 72 hours. No results for input(s): BLOODCULT2 in the last 72 hours. No results for input(s): LABURIN in the last 72 hours.  Respiratory cultures No results found for: RESPCULTURE No results found for: LABGRAM  Urine No results found for: LABURIN  Legionella No results found for: LABLEGI  C Diff PCR No results found for: CDIFPCR  Wound culture/abscess: No results for input(s): WNDABS in the last 72 hours.  Tip culture:No results for input(s): CXCATHTIP in the last 72 hours.  ------------------------------------------------------------  Organism No results found for: ORG  Aerobic No components found for: LABEARO  Anaerobic No results found for:  Katherene Remak  ------------------------------------------------------------  Strep pneumo No results found for: SPNEUAGU  HSV No results found for: HSVSRC  FLU No results found for: FLUA No results found for: FLUB  Acid fast No results found for: AFBSMEAR  Stool No results found for: CXST  Fungust No results found for: FUNGUSSMEAR  VRE screen No results found for: VRECX  MRSA scree No components found for: MRSACU  Ecoli O051:H7 No results found for: ONYZB638  Giardia No results found for: GIAAGS  Rotavirus No results found for: ROTA  Fecal leukocytes No results found for: FECLEU  -----------------------------------------------------  Fecal occult blood: . No results for input(s): OCCULTBLDFEC in the last 72 hours. Occult blood screening: No results for input(s): OCCBS in the last 72 hours. Occult blood QC: No results for input(s): OBQC in the last 72 hours. ABGs:   No results for input(s): PH, PCO2, PO2, HCO3, BE, O2SAT in the last 72 hours. VENT SETTINGS (Comprehensive) (if applicable): Lo Bloodgood Additional Respiratory Assessments  Heart Rate: 79  Resp: 16  SpO2: 96 %  Swallow: Normal - able to swallow solids  . Imagery last 7 days  CT HEAD WO CONTRAST    Result Date: 1/21/2023  EXAMINATION: CT OF THE HEAD WITHOUT CONTRAST  1/21/2023 12:49 am TECHNIQUE: CT of the head was performed without the administration of intravenous contrast. Automated exposure control, iterative reconstruction, and/or weight based adjustment of the mA/kV was utilized to reduce the radiation dose to as low as reasonably achievable. COMPARISON: 01/21/2023 HISTORY: ORDERING SYSTEM PROVIDED HISTORY: post vertebral artery thrombectomy and stenting TECHNOLOGIST PROVIDED HISTORY: To be performed 24 hours after first CT Head. Reason for exam:->post vertebral artery thrombectomy and stenting Has a \"code stroke\" or \"stroke alert\" been called? ->No What reading provider will be dictating this exam?->CRC FINDINGS: BRAIN/VENTRICLES: There is a stable zone of encephalomalacia in the left cerebellum along the posteroinferior cerebellar artery distribution. There is slight effacement of the sulci suggest underlying cerebral edema but no midline shift is observed. There is an evolving area of encephalomalacia involving the right posterior cerebral artery distribution with there is increased low density along the posterior inferior aspect of the right occipital lobe. The gray-white differentiation is maintained without evidence of an acute infarct. There is no evidence of hydrocephalus. ORBITS: The visualized portion of the orbits demonstrate no acute abnormality. SINUSES: The visualized paranasal sinuses reveals opacification of the right sphenoid sinus is well as multiple ethmoid air cells suggests chronic sinusitis. The mastoid air cells demonstrate no acute abnormality. SOFT TISSUES/SKULL:  No acute abnormality of the visualized skull or soft tissues. 1. Evolving infarct involving the right posterior cerebral artery distribution. 2. Stable infarct involving the left posteroinferior cerebellar artery distribution. .     CT HEAD WO CONTRAST    Result Date: 1/20/2023  EXAMINATION: CT OF THE HEAD WITHOUT CONTRAST  1/20/2023 12:42 am TECHNIQUE: CT of the head was performed without the administration of intravenous contrast. Automated exposure control, iterative reconstruction, and/or weight based adjustment of the mA/kV was utilized to reduce the radiation dose to as low as reasonably achievable. COMPARISON: None. HISTORY: ORDERING SYSTEM PROVIDED HISTORY: follow up ischemic stroke, [facility] TECHNOLOGIST PROVIDED HISTORY: Reason for exam:->follow up ischemic stroke, [facility] Has a \"code stroke\" or \"stroke alert\" been called? ->Yes What reading provider will be dictating this exam?->CRC FINDINGS: BRAIN/VENTRICLES: There is abnormal amorphous low attenuation obscuring architecture of the left cerebellar hemisphere.   The features have high association with acute infarction associated with the posterior inferior cerebellar artery. There is cerebral atrophy with associated ex vacuo dilatation of the ventricular system. There is mild ill-defined low-attenuation in the periventricular white matter, corona radiata, and centrum semiovale bilaterally which has association with age related microvascular white matter ischemic disease. There is mild atherosclerotic plaque in the bilateral carotid canal, carotid siphon, and cavernous internal carotid artery (<50%). There is no acute intracranial hemorrhage, mass effect, or midline shift. No abnormal extra-axial fluid collection. There is no evidence of hydrocephalus. ORBITS: The visualized portion of the orbits demonstrate no acute abnormality. SINUSES: There is mild polypoid perimucosal thickening in the right sphenoid sinus and posterior left-sided ethmoid air cells. The visualized mastoid air cells demonstrate no acute abnormality. SOFT TISSUES/SKULL:  No acute abnormality of the visualized skull or soft tissues. Subacute infarction localized in the inferior left cerebellar hemisphere in the PICA distribution. No hemorrhage. Chronic parenchymal change including atrophy and old white matter ischemia. IR TRANSCATH PLACE STENT UPPER W PTA INIT ARTERY    Result Date: 1/21/2023  IR GUIDED PERC TRANSLUM NONCOR ART THROMBECTOMY INIT, IR INTRACRANIAL STENT(S), IR TRANSCATH PLACE STENT UPPER W PTA INIT ARTERY PROCEDURE PERFORMED: 1. Cerebral angiogram with mechanical thrombectomy of the left extracranial and intracranial portions of the vertebral artery 2. Stent assisted complete reconstruction of the Left  V1 and V2 portions of the extracranial vertebral artery WITH distal embolic protection 3. Recurrent reocclusion of the intracranial vertebral artery status post repeat thrombectomy 4. Artery to artery emboli into the left posterior cerebral artery and the right posterior cerebral artery status post aspiration thrombectomy of the right PCA and the left PCA 5. Successful intracranial stenting ( [employer] VISION) of the V4 portion of the left vertebral artery to prevent recurrent occlusion 6. Intraoperative Ame CT Limited CT with interpretation COMPLETED DATE:  1/20/2023 5:00 PM CLINICAL HISTORY/PRE-PROCEDURE DIAGNOSIS: Acute ischemic stroke. Onset of stroke was on January 17. Patient has having recurrent posterior's circulation stroke despite maximal medical therapy. Patient was then transferred from outside hospital to here. Medical therapy maximized with heparin drip. Continues to have more cranial nerve deficits. Procedure was offered on a humanitarian basis as he is still having strokes on heparin drip POST-PROCEDURE DIAGNOSIS: Successful mechanical thrombectomy successful stent assisted reconstruction/recanalization of the left vertebral artery connecting it to the basilar artery/restoration of flow COMPARISON: CT angiographic documentation done at outside hospital images were transferred into PACS. Data imaging 1/19/2023 MRI 1/20/2023 ANESTHESIA: Conscious sedation and local anesthesia VESSELS CATHETERIZED: 1. Left vertebral artery. 2. Basilar artery. 3. Left posterior cerebral artery. 4 right posterior cerebral artery. 6. Left subclavian artery. RADIATION EXPOSURE DATA:  1177 MG Y TOTAL FLUOROSCOPY TIME: 29 minutes and 36 seconds CONTRAST USED: 125 mL of Isovue-300 PROCEDURE: Following informed consent, the patient was brought to the angiography suite, both groins are prepped and draped in usual sterile manner. Vascular access was obtained in the right common femoral artery with a 8-Vietnamese sheath which was connected to continuous heparinized saline flush. A 8-Vietnamese cerebase catheter was prepped and with the support of a diagnostic catheter was brought into the aorta, double flushed and connected to continuous heparinized saline flush. The  guide catheter was then telescoped into the left subclavian artery.  Fluoroscopic imaging performed at that time confirmed the presence of previously documented arterial occlusion. Multilevel partial thrombosis all throughout the left vertebral artery severe stenosis at the origin of the left vertebral artery/vertebral subclavian junction. Following this aspiration was initially performed through the cerebral base catheter through the Zoom suction device and then gradually a Zoom 71 was taken up over a glide advantage wire exchange length and continuous aspiration was performed wall pushing the aspiration catheter forward into the left vertebral artery. Large amount of thrombus was identified. Following this imaging was performed which showed intracranial V4 occlusion of the left vertebral artery and hence using Zoom 35 system through the Zoom 71 system serial aspiration was performed successful thrombectomy was performed until the midportion of the basilar artery. No distal embolization was noticed. On repeat imaging through the proximal vertebral artery there was really occlusion noticed in the V2 segment and hence it was decided to proceed with the stent assisted reconstruction of the entire vessel. A 5 mm spider device was then deployed into the V3 segment of the vertebral artery Patient was started on Integrilin. Initially 1 carotid Wallstent was placed in the distal portionv2 and there is was overlap with the second portion. Again in the V1 portion and initial carotid stent was placed and in the subclavian vertebral junction there is severe stenosis and hence of fourth carotid wall stent was also placed here. After a total of 4 stents were deployed in the V1 and V2 segments angiographic documentation was repeated which showed distal occlusion in the V4 segment and hence a repeat thrombectomy was performed in the V4 segment. This resulted in artery to artery embolization into bilateral posterior cerebral arteries and the basilar tip.  Aspiration thrombectomy was performed initially in the left posterior cerebral artery and was successful. Aspiration thrombectomy was then repeated in the right posterior cerebral artery multiple attempts. Following this it was decided to stent the V4 segment that keeps reoccluding and hence balloon mounted stent  3.5mm x 18 mm Multilink vision was taken over and deployed over a 014 wire that was taken into the left vertebral artery and internal taken down into the right vertebral artery for appropriate support to deployed without a intermediate catheter. The balloon mounted stent was successfully deployed at 8 claude. Angiographic documentation repeated with contrast reveals good flow throughout the left vertebral artery this was repeated again at 3 minute interval and was persistently open. Intraoperative Ame CT was obtained which was negative for any stephanie obvious intracranial hemorrhage. Patient will be maintained on Integrilin drip. Anesthesia was reversed and patient was transferred to the postoperative recovery unit in a stable condition. STROKE ACUTE TIME STAMPS: Please see nursing documentation epic INTERPRETATION OF IMAGES: 1. Left  vertebral artery injections: The common and internal carotid arteries had partial thrombosis of the V1 and V2 segment. Complete thrombosis of the V3 segment complete thrombosis of the V4 segment with no flow contrast leading to the basilar artery. 2. Angiographic documentation post carotid Wallstent 6 mm x 22 mm deployment: Stent 1. Was deployed in the distal V2 portion of the vertebral artery post deployment no evidence of in-stent stenosis or thrombosis. 3. Angiographic documentation post carotid Wallstent 6 mm x 22 mm deployment: Stent was deployed in the mid V2 portion angiogram reveals patency of the stent 4. Angiographic documentation post carotid Wallstent 6 mm x 22 mm deployment: Stent was deployed in the proximal V2 to V1 portion. Repeat angiogram reveals that the first and second stent have occluded.  5.Angiographic documentation post carotid Wallstent 6 mm x 22 mm deployment: Stent was deployed from the V4 segment to the subclavian artery. Persistent reocclusion. 6. Angiogram post thrombectomy of the V1 V2 V3 segment successful recanalization however the distal V4 portion seems to have reoccluded. 7. Angiogram post thrombectomy of the intracranial vertebral artery: There appears to be distal embolization into the PCA. 8. Angiogram post thrombectomy of bilateral posterior cerebral arteries: Post thrombectomy images reveal satisfactory recanalization of bilateral posterior cerebral arteries 9. Angiogram post intracranial stenting with Multi-Link stent: Multi-Link stent is placed in the V4 segment no evidence of any in-stent stenosis or thrombosis. 10.Postprocedure left vertebral artery artery injections: Postprocedure all the stents are patent. Restoration of flow to the posterior circulation. No evidence of restenosis artery thrombosis. TICI 3 Intraoperative Ame CT Limited CT with interpretation: Intraoperative Ame CT Limited CT with interpretation was performed using 3-D rotational technique. Axial coronal and sagittal images were reconstructed. There were reviewed and did not show any evidence of intracranial hemorrhage. IMPRESSION 1. 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 Partial from doses of the V1 and V2 segments suspicious for vessel trauma. 2. Successful thrombectomy followed by reocclusion of the left vertebral artery both extracranial and intracranial segments. 3. Complete stent assisted reconstruction of the extracranial left vertebral artery with distal embolic protection. 4. Left posterior cerebral artery and right posterior cerebral artery thrombectomy as a result from artery to artery emboli 5. Successful rescue mechanical intracranial stenting of the V4 segment to prevent reocclusion Patients:  If you have questions regarding some of the verbiage in your report, please visit [email or web address] for a definition. If you have any other questions, please contact your physician. IR INTRACRANIAL STENT(S)    Result Date: 1/21/2023  IR GUIDED PERC TRANSLUM NONCOR ART THROMBECTOMY INIT, IR INTRACRANIAL STENT(S), IR TRANSCATH PLACE STENT UPPER W PTA INIT ARTERY PROCEDURE PERFORMED: 1. Cerebral angiogram with mechanical thrombectomy of the left extracranial and intracranial portions of the vertebral artery 2. Stent assisted complete reconstruction of the Left  V1 and V2 portions of the extracranial vertebral artery WITH distal embolic protection 3. Recurrent reocclusion of the intracranial vertebral artery status post repeat thrombectomy 4. Artery to artery emboli into the left posterior cerebral artery and the right posterior cerebral artery status post aspiration thrombectomy of the right PCA and the left PCA 5. Successful intracranial stenting ( MULTILINK VISION) of the V4 portion of the left vertebral artery to prevent recurrent occlusion 6. Intraoperative Ame CT Limited CT with interpretation COMPLETED DATE:  1/20/2023 5:00 PM CLINICAL HISTORY/PRE-PROCEDURE DIAGNOSIS: Acute ischemic stroke. Onset of stroke was on January 17. Patient has having recurrent posterior's circulation stroke despite maximal medical therapy. Patient was then transferred from outside hospital to here. Medical therapy maximized with heparin drip. Continues to have more cranial nerve deficits. Procedure was offered on a humanitarian basis as he is still having strokes on heparin drip POST-PROCEDURE DIAGNOSIS: Successful mechanical thrombectomy successful stent assisted reconstruction/recanalization of the left vertebral artery connecting it to the basilar artery/restoration of flow COMPARISON: CT angiographic documentation done at outside hospital images were transferred into PACS. Data imaging 1/19/2023 MRI 1/20/2023 ANESTHESIA: Conscious sedation and local anesthesia VESSELS CATHETERIZED: 1.  Left vertebral artery. 2. Basilar artery. 3. Left posterior cerebral artery. 4 right posterior cerebral artery. 6. Left subclavian artery. RADIATION EXPOSURE DATA:  1177 MG Y TOTAL FLUOROSCOPY TIME: 29 minutes and 36 seconds CONTRAST USED: 125 mL of Isovue-300 PROCEDURE: Following informed consent, the patient was brought to the angiography suite, both groins are prepped and draped in usual sterile manner. Vascular access was obtained in the right common femoral artery with a 8-[place] sheath which was connected to continuous heparinized saline flush. A 8-[place] cerebase catheter was prepped and with the support of a diagnostic catheter was brought into the aorta, double flushed and connected to continuous heparinized saline flush. The  guide catheter was then telescoped into the left subclavian artery. Fluoroscopic imaging performed at that time confirmed the presence of previously documented arterial occlusion. Multilevel partial thrombosis all throughout the left vertebral artery severe stenosis at the origin of the left vertebral artery/vertebral subclavian junction. Following this aspiration was initially performed through the cerebral base catheter through the Zoom suction device and then gradually a Zoom 71 was taken up over a glide advantage wire exchange length and continuous aspiration was performed wall pushing the aspiration catheter forward into the left vertebral artery. Large amount of thrombus was identified. Following this imaging was performed which showed intracranial V4 occlusion of the left vertebral artery and hence using Zoom 35 system through the Zoom 71 system serial aspiration was performed successful thrombectomy was performed until the midportion of the basilar artery. No distal embolization was noticed.  On repeat imaging through the proximal vertebral artery there was really occlusion noticed in the V2 segment and hence it was decided to proceed with the stent assisted reconstruction of the entire vessel. A 5 mm spider device was then deployed into the V3 segment of the vertebral artery Patient was started on Integrilin. Initially 1 carotid Wallstent was placed in the distal portionv2 and there is was overlap with the second portion. Again in the V1 portion and initial carotid stent was placed and in the subclavian vertebral junction there is severe stenosis and hence of fourth carotid wall stent was also placed here. After a total of 4 stents were deployed in the V1 and V2 segments angiographic documentation was repeated which showed distal occlusion in the V4 segment and hence a repeat thrombectomy was performed in the V4 segment. This resulted in artery to artery embolization into bilateral posterior cerebral arteries and the basilar tip. Aspiration thrombectomy was performed initially in the left posterior cerebral artery and was successful. Aspiration thrombectomy was then repeated in the right posterior cerebral artery multiple attempts. Following this it was decided to stent the V4 segment that keeps reoccluding and hence balloon mounted stent  3.5mm x 18 mm Multilink vision was taken over and deployed over a 014 wire that was taken into the left vertebral artery and internal taken down into the right vertebral artery for appropriate support to deployed without a intermediate catheter. The balloon mounted stent was successfully deployed at 8 claude. Angiographic documentation repeated with contrast reveals good flow throughout the left vertebral artery this was repeated again at 3 minute interval and was persistently open. Intraoperative Ame CT was obtained which was negative for any stephanie obvious intracranial hemorrhage. Patient will be maintained on Integrilin drip. Anesthesia was reversed and patient was transferred to the postoperative recovery unit in a stable condition. STROKE ACUTE TIME STAMPS: Please see nursing documentation epic INTERPRETATION OF IMAGES: 1.   Left  vertebral artery injections: The common and internal carotid arteries had partial thrombosis of the V1 and V2 segment. Complete thrombosis of the V3 segment complete thrombosis of the V4 segment with no flow contrast leading to the basilar artery. 2. Angiographic documentation post carotid Wallstent 6 mm x 22 mm deployment: Stent 1. Was deployed in the distal V2 portion of the vertebral artery post deployment no evidence of in-stent stenosis or thrombosis. 3. Angiographic documentation post carotid Wallstent 6 mm x 22 mm deployment: Stent was deployed in the mid V2 portion angiogram reveals patency of the stent 4. Angiographic documentation post carotid Wallstent 6 mm x 22 mm deployment: Stent was deployed in the proximal V2 to V1 portion. Repeat angiogram reveals that the first and second stent have occluded. 5.Angiographic documentation post carotid Wallstent 6 mm x 22 mm deployment: Stent was deployed from the V4 segment to the subclavian artery. Persistent reocclusion. 6. Angiogram post thrombectomy of the V1 V2 V3 segment successful recanalization however the distal V4 portion seems to have reoccluded. 7. Angiogram post thrombectomy of the intracranial vertebral artery: There appears to be distal embolization into the PCA. 8. Angiogram post thrombectomy of bilateral posterior cerebral arteries: Post thrombectomy images reveal satisfactory recanalization of bilateral posterior cerebral arteries 9. Angiogram post intracranial stenting with Multi-Link stent: Multi-Link stent is placed in the V4 segment no evidence of any in-stent stenosis or thrombosis. 10.Postprocedure left vertebral artery artery injections: Postprocedure all the stents are patent. Restoration of flow to the posterior circulation. No evidence of restenosis artery thrombosis. TICI 3 Intraoperative Ame CT Limited CT with interpretation: Intraoperative Ame CT Limited CT with interpretation was performed using 3-D rotational technique.  Axial coronal and sagittal images were reconstructed. There were reviewed and did not show any evidence of intracranial hemorrhage. IMPRESSION 1. 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 Partial from doses of the V1 and V2 segments suspicious for vessel trauma. 2. Successful thrombectomy followed by reocclusion of the left vertebral artery both extracranial and intracranial segments. 3. Complete stent assisted reconstruction of the extracranial left vertebral artery with distal embolic protection. 4. Left posterior cerebral artery and right posterior cerebral artery thrombectomy as a result from artery to artery emboli 5. Successful rescue mechanical intracranial stenting of the V4 segment to prevent reocclusion Patients: If you have questions regarding some of the verbiage in your report, please visit [email or web address] for a definition. If you have any other questions, please contact your physician. MRI BRAIN WO CONTRAST    Result Date: 1/20/2023  EXAMINATION: MRI OF THE BRAIN WITHOUT CONTRAST  1/20/2023 11:26 am TECHNIQUE: Multiplanar multisequence MRI of the brain was performed without the administration of intravenous contrast. COMPARISON: Head CT dated 01/20/2023 and outside MRI brain dated 01/19/2023 HISTORY: ORDERING SYSTEM PROVIDED HISTORY: stroke protocol TECHNOLOGIST PROVIDED HISTORY: Reason for exam:->stroke protocol What is the sedation requirement?->None What reading provider will be dictating this exam?->CRC FINDINGS: INTRACRANIAL STRUCTURES/VENTRICLES: There is a large area of restricted diffusion in the inferior left cerebellum, as well as the left dorsal medulla, consistent with acute/subacute infarct, primarily in the left posteroinferior cerebellar artery territory. Small area of restricted diffusion again seen in the right cerebellum on series 2, image 11, consistent with acute/subacute infarct. No new area of restricted diffusion is seen.   There is associated cytotoxic edema in the left inferior cerebellum with slight effacement of the overlying cortical sulci. No other area of mass effect or midline shift. Tiny focus of low signal is seen in the right ezekiel on gradient echo series 5, image 11. This was present on the prior study from 01/19/2023. It is of indeterminate age but could represent old petechial hemorrhage. Otherwise, no evidence of an acute intracranial hemorrhage. The ventricles and sulci are normal in size and configuration. The sellar/suprasellar regions appear unremarkable. The normal signal voids within the major intracranial vessels appear maintained. ORBITS: The visualized portion of the orbits demonstrate no acute abnormality. SINUSES: The visualized paranasal sinuses and mastoid air cells demonstrate no acute abnormality. There is again severe mucosal thickening in the right sphenoid sinus which is almost completely opacified and moderate mucosal thickening in the left ethmoid sinus as well as mild thickening in other sinuses. BONES/SOFT TISSUES: The bone marrow signal intensity appears normal. The soft tissues demonstrate no acute abnormality. 1. Bilateral acute/subacute infarcts including left PICA infarct and small lacunar infarct in the right cerebellum, similar compared to the prior study. 2. Probable area of old petechial hemorrhage in the right lateral ezekiel. 3. Otherwise, no new acute intracranial abnormality seen. 4. Chronic sinus disease. IR GUIDED PERC TRANSLUM ART THROMBECTOMY INIT    Result Date: 1/21/2023  IR GUIDED PERC TRANSLUM NONCOR ART THROMBECTOMY INIT, IR INTRACRANIAL STENT(S), IR TRANSCATH PLACE STENT UPPER W PTA INIT ARTERY PROCEDURE PERFORMED: 1. Cerebral angiogram with mechanical thrombectomy of the left extracranial and intracranial portions of the vertebral artery 2. Stent assisted complete reconstruction of the Left  V1 and V2 portions of the extracranial vertebral artery WITH distal embolic protection 3. Recurrent reocclusion of the intracranial vertebral artery status post repeat thrombectomy 4. Artery to artery emboli into the left posterior cerebral artery and the right posterior cerebral artery status post aspiration thrombectomy of the right PCA and the left PCA 5. Successful intracranial stenting ( MULTILINK VISION) of the V4 portion of the left vertebral artery to prevent recurrent occlusion 6. Intraoperative Ame CT Limited CT with interpretation COMPLETED DATE:  1/20/2023 5:00 PM CLINICAL HISTORY/PRE-PROCEDURE DIAGNOSIS: Acute ischemic stroke. Onset of stroke was on January 17. Patient has having recurrent posterior's circulation stroke despite maximal medical therapy. Patient was then transferred from outside hospital to here. Medical therapy maximized with heparin drip. Continues to have more cranial nerve deficits. Procedure was offered on a humanitarian basis as he is still having strokes on heparin drip POST-PROCEDURE DIAGNOSIS: Successful mechanical thrombectomy successful stent assisted reconstruction/recanalization of the left vertebral artery connecting it to the basilar artery/restoration of flow COMPARISON: CT angiographic documentation done at outside hospital images were transferred into PACS. Data imaging 1/19/2023 MRI 1/20/2023 ANESTHESIA: Conscious sedation and local anesthesia VESSELS CATHETERIZED: 1. Left vertebral artery. 2. Basilar artery. 3. Left posterior cerebral artery. 4 right posterior cerebral artery. 6. Left subclavian artery. RADIATION EXPOSURE DATA:  1177 MG Y TOTAL FLUOROSCOPY TIME: 29 minutes and 36 seconds CONTRAST USED: 125 mL of Isovue-300 PROCEDURE: Following informed consent, the patient was brought to the angiography suite, both groins are prepped and draped in usual sterile manner. Vascular access was obtained in the right common femoral artery with a 8-[place] sheath which was connected to continuous heparinized saline flush.   A 8-[place] cerebase catheter was prepped and with the support of a diagnostic catheter was brought into the aorta, double flushed and connected to continuous heparinized saline flush. The  guide catheter was then telescoped into the left subclavian artery. Fluoroscopic imaging performed at that time confirmed the presence of previously documented arterial occlusion. Multilevel partial thrombosis all throughout the left vertebral artery severe stenosis at the origin of the left vertebral artery/vertebral subclavian junction. Following this aspiration was initially performed through the cerebral base catheter through the Zoom suction device and then gradually a Zoom 71 was taken up over a glide advantage wire exchange length and continuous aspiration was performed wall pushing the aspiration catheter forward into the left vertebral artery. Large amount of thrombus was identified. Following this imaging was performed which showed intracranial V4 occlusion of the left vertebral artery and hence using Zoom 35 system through the Zoom 71 system serial aspiration was performed successful thrombectomy was performed until the midportion of the basilar artery. No distal embolization was noticed. On repeat imaging through the proximal vertebral artery there was really occlusion noticed in the V2 segment and hence it was decided to proceed with the stent assisted reconstruction of the entire vessel. A 5 mm spider device was then deployed into the V3 segment of the vertebral artery Patient was started on Integrilin. Initially 1 carotid Wallstent was placed in the distal portionv2 and there is was overlap with the second portion. Again in the V1 portion and initial carotid stent was placed and in the subclavian vertebral junction there is severe stenosis and hence of fourth carotid wall stent was also placed here.  After a total of 4 stents were deployed in the V1 and V2 segments angiographic documentation was repeated which showed distal occlusion in the V4 segment and hence a repeat thrombectomy was performed in the V4 segment. This resulted in artery to artery embolization into bilateral posterior cerebral arteries and the basilar tip. Aspiration thrombectomy was performed initially in the left posterior cerebral artery and was successful. Aspiration thrombectomy was then repeated in the right posterior cerebral artery multiple attempts. Following this it was decided to stent the V4 segment that keeps reoccluding and hence balloon mounted stent  3.5mm x 18 mm Multilink vision was taken over and deployed over a 014 wire that was taken into the left vertebral artery and internal taken down into the right vertebral artery for appropriate support to deployed without a intermediate catheter. The balloon mounted stent was successfully deployed at 8 claude. Angiographic documentation repeated with contrast reveals good flow throughout the left vertebral artery this was repeated again at 3 minute interval and was persistently open. Intraoperative Ame CT was obtained which was negative for any stephanie obvious intracranial hemorrhage. Patient will be maintained on Integrilin drip. Anesthesia was reversed and patient was transferred to the postoperative recovery unit in a stable condition. STROKE ACUTE TIME STAMPS: Please see nursing documentation epic INTERPRETATION OF IMAGES: 1. Left  vertebral artery injections: The common and internal carotid arteries had partial thrombosis of the V1 and V2 segment. Complete thrombosis of the V3 segment complete thrombosis of the V4 segment with no flow contrast leading to the basilar artery. 2. Angiographic documentation post carotid Wallstent 6 mm x 22 mm deployment: Stent 1. Was deployed in the distal V2 portion of the vertebral artery post deployment no evidence of in-stent stenosis or thrombosis. 3. Angiographic documentation post carotid Wallstent 6 mm x 22 mm deployment: Stent was deployed in the mid V2 portion angiogram reveals patency of the stent 4. Angiographic documentation post carotid Wallstent 6 mm x 22 mm deployment: Stent was deployed in the proximal V2 to V1 portion. Repeat angiogram reveals that the first and second stent have occluded. 5.Angiographic documentation post carotid Wallstent 6 mm x 22 mm deployment: Stent was deployed from the V4 segment to the subclavian artery. Persistent reocclusion. 6. Angiogram post thrombectomy of the V1 V2 V3 segment successful recanalization however the distal V4 portion seems to have reoccluded. 7. Angiogram post thrombectomy of the intracranial vertebral artery: There appears to be distal embolization into the PCA. 8. Angiogram post thrombectomy of bilateral posterior cerebral arteries: Post thrombectomy images reveal satisfactory recanalization of bilateral posterior cerebral arteries 9. Angiogram post intracranial stenting with Multi-Link stent: Multi-Link stent is placed in the V4 segment no evidence of any in-stent stenosis or thrombosis. 10.Postprocedure left vertebral artery artery injections: Postprocedure all the stents are patent. Restoration of flow to the posterior circulation. No evidence of restenosis artery thrombosis. TICI 3 Intraoperative Ame CT Limited CT with interpretation: Intraoperative Ame CT Limited CT with interpretation was performed using 3-D rotational technique. Axial coronal and sagittal images were reconstructed. There were reviewed and did not show any evidence of intracranial hemorrhage. IMPRESSION 1. 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 Partial from doses of the V1 and V2 segments suspicious for vessel trauma. 2. Successful thrombectomy followed by reocclusion of the left vertebral artery both extracranial and intracranial segments. 3. Complete stent assisted reconstruction of the extracranial left vertebral artery with distal embolic protection. 4. Left posterior cerebral artery and right posterior cerebral artery thrombectomy as a result from artery to artery emboli 5. Successful rescue mechanical intracranial stenting of the V4 segment to prevent reocclusion Patients: If you have questions regarding some of the verbiage in your report, please visit [email or web address] for a definition. If you have any other questions, please contact your physician. US DUP LOWER EXTREMITIES BILATERAL VENOUS    Result Date: 1/21/2023  EXAMINATION: DUPLEX VENOUS ULTRASOUND OF THE BILATERAL LOWER EXTREMITIES1/21/2023 1:54 pm TECHNIQUE: Duplex ultrasound using B-mode/gray scaled imaging, Doppler spectral analysis and color flow Doppler was obtained of the deep venous structures of the lower bilateral extremities. COMPARISON: None. HISTORY: ORDERING SYSTEM PROVIDED HISTORY: r/o DVT TECHNOLOGIST PROVIDED HISTORY: Reason for exam:->r/o DVT What reading provider will be dictating this exam?->CRC FINDINGS: The visualized veins of the bilateral lower extremities are patent and free of echogenic thrombus. The common femoral veins and iliac veins are limited evaluation due to the presence of bandages. The veins of the lower extremity however demonstrate good compressibility with normal color flow study and spectral analysis. No evidence of DVT in either lower extremity. All Others since admission  CT HEAD WO CONTRAST    Result Date: 1/21/2023  1. Evolving infarct involving the right posterior cerebral artery distribution. 2. Stable infarct involving the left posteroinferior cerebellar artery distribution. .     CT HEAD WO CONTRAST    Result Date: 1/20/2023  Subacute infarction localized in the inferior left cerebellar hemisphere in the PICA distribution. No hemorrhage. Chronic parenchymal change including atrophy and old white matter ischemia. MRI BRAIN WO CONTRAST    Result Date: 1/20/2023  1. Bilateral acute/subacute infarcts including left PICA infarct and small lacunar infarct in the right cerebellum, similar compared to the prior study.  2. Probable area of old petechial hemorrhage in the right lateral ezekiel. 3. Otherwise, no new acute intracranial abnormality seen. 4. Chronic sinus disease. US DUP LOWER EXTREMITIES BILATERAL VENOUS    Result Date: 1/21/2023  No evidence of DVT in either lower extremity. EKG  :     Rhythm Strip :Normal Sinus Rhythm    ECHO  :                        Assessment and Plan of care     Diagnosis:  Acute CVA left cerebellar stroke  S/P Left vertebral artery thrombectomy, left vertebral artery complete reconstruction with 5 stents. Left vertebral artery dissection  Vertical diplopia  Etoh abuse  COPD  Bradycardia resolved  Tobacco abuse  Delirium Tremens now improved    Plan:    Neuro: Patient is following commands, no focal neurodeficit. Off Precedex drip. Patient is not showing any features of alcohol withdrawal at this moment. Continue CIWA protocol. Folic acid and thiamine to continue. Pulmonary: Continue Brovana, DuoNeb, budesonide  Cardiovascular: Continue aspirin and Brilinta. Continue atorvastatin. Abdomen/GI:No acute issues , continue diet  Renal: Decent urine output, continue to monitor renal function  MSK: No active issues   Skin: No active issues   Hematology: No active issues  Endocrine: Monitor BS  GI: Prophylaxis: Continue Protonix  Code Status: Full Code  Disposition: MICU  Total Critical care time spent  35 mins. This did not include any procedures. During multidisciplinary team rounds Brett Su, was seen, examined and discussed. This is confirmation that I have personally seen and examined the patient and that the key elements of the encounter were performed by me (> 85 % time). The medications & laboratory data and imagery was discussed and adjusted where necessary. Key issues of the case were discussed among consultants. This patient has a high probability of sudden clinically significant deterioration. I managed/supervised life or organ supporting interventions that required frequent physician assessment.  I devoted my full attention to the direct care of this patient for the period of time indicated below. In addition to above, time was devoted to teaching and to any procedure. NOTE: This report, in part or full, may have been transcribed using voice recognition software. Every effort was made to ensure accuracy; however, inadvertent computerized transcription errors may be present. Please excuse any transcriptional grammatical or spelling errors that may have escaped my editorial review. Total critical care time caring for this patient with life threatening, unstable organ failure, including direct patient contact, management of life support systems, review of data including imaging and labs, discussions with other team members and physicians is at least 28 Min so far today, excluding procedures.       Electronically signed by Jose Locke MD on 1/23/2023 at [address] [place] [address]

## 2023-01-23 NOTE — CARE COORDINATION
Care Coordination: LOS 3:  Met with sister Jodi Box and mother outside room to discuss transition of care . Completely independent pta, no hx of hhc, dme or mago. He lives alone, stays on main floor but bathroom is upstairs and approx 12 steps. No hx of pharmacy, follows with Dr Cher Mays. Jodi Box is adamant and mother is in agreement that he will needs Rehab prior to returning to home. Concerned regarding years of drinking as well. They are agreeable to [address] here, but feel [facility] Snowden is too far from [facility]. They have requested a list from the [facility]. They have my contact information and will call with MAGO erik in case he is not a candidate for [facility]. They will transport if able, it will depend on current status at discharge. Pt is [place] . If Dme is needed, prefer [facility] home medical. Addendum: PMR consult placed on chart    Lawanda Ruben    The Plan for Transition of Care is related to the following treatment goals: dc  The Patient and/or patient representative sister and mom was provided with a choice of provider and agrees   with the discharge plan. [x] Yes [] No    Freedom of choice list was provided with basic dialogue that supports the patient's individualized plan of care/goals, treatment preferences and shares the quality data associated with the providers.  [x] Yes [] No

## 2023-01-23 NOTE — PLAN OF CARE
Problem: Discharge Planning  Goal: Discharge to home or other facility with appropriate resources  Outcome: Progressing     Problem: Chronic Conditions and Co-morbidities  Goal: Patient's chronic conditions and co-morbidity symptoms are monitored and maintained or improved  1/23/2023 0054 by Don Cristina RN  Outcome: Progressing       Problem: ABCDS Injury Assessment  Goal: Absence of physical injury  Outcome: Progressing     Problem: Safety - Adult  Goal: Free from fall injury  1/23/2023 0054 by Don Cristina RN  Outcome: Progressing     Problem: Pain  Goal: Verbalizes/displays adequate comfort level or baseline comfort level  1/23/2023 0054 by Dno Cristina RN  Outcome: Progressing    Problem: Skin/Tissue Integrity  Goal: Absence of new skin breakdown  Description: 1. Monitor for areas of redness and/or skin breakdown  2. Assess vascular access sites hourly  3. Every 4-6 hours minimum:  Change oxygen saturation probe site  4. Every 4-6 hours:  If on nasal continuous positive airway pressure, respiratory therapy assess nares and determine need for appliance change or resting period.   1/23/2023 0054 by Don Cristina RN  Outcome: Progressing

## 2023-01-24 LAB
ALBUMIN SERPL-MCNC: 4 G/DL (ref 3.5–5.2)
ALP BLD-CCNC: 116 U/L (ref 40–129)
ALT SERPL-CCNC: 25 U/L (ref 0–40)
ANION GAP SERPL CALCULATED.3IONS-SCNC: 12 MMOL/L (ref 7–16)
AST SERPL-CCNC: 29 U/L (ref 0–39)
BASOPHILS ABSOLUTE: 0.03 E9/L (ref 0–0.2)
BASOPHILS RELATIVE PERCENT: 0.5 % (ref 0–2)
BILIRUB SERPL-MCNC: 0.5 MG/DL (ref 0–1.2)
BUN BLDV-MCNC: 10 MG/DL (ref 6–23)
CALCIUM IONIZED: 1.31 MMOL/L (ref 1.15–1.33)
CALCIUM SERPL-MCNC: 9.1 MG/DL (ref 8.6–10.2)
CHLORIDE BLD-SCNC: 101 MMOL/L (ref 98–107)
CO2: 23 MMOL/L (ref 22–29)
CREAT SERPL-MCNC: 0.7 MG/DL (ref 0.7–1.2)
EOSINOPHILS ABSOLUTE: 0.12 E9/L (ref 0.05–0.5)
EOSINOPHILS RELATIVE PERCENT: 2 % (ref 0–6)
GFR SERPL CREATININE-BSD FRML MDRD: >60 ML/MIN/1.73
GLUCOSE BLD-MCNC: 110 MG/DL (ref 74–99)
HCT VFR BLD CALC: 33.5 % (ref 37–54)
HEMOGLOBIN: 11.6 G/DL (ref 12.5–16.5)
IMMATURE GRANULOCYTES #: 0.01 E9/L
IMMATURE GRANULOCYTES %: 0.2 % (ref 0–5)
INR BLD: 1.2
LYMPHOCYTES ABSOLUTE: 1.64 E9/L (ref 1.5–4)
LYMPHOCYTES RELATIVE PERCENT: 28 % (ref 20–42)
MAGNESIUM: 2.1 MG/DL (ref 1.6–2.6)
MCH RBC QN AUTO: 31.1 PG (ref 26–35)
MCHC RBC AUTO-ENTMCNC: 34.6 % (ref 32–34.5)
MCV RBC AUTO: 89.8 FL (ref 80–99.9)
MONOCYTES ABSOLUTE: 0.59 E9/L (ref 0.1–0.95)
MONOCYTES RELATIVE PERCENT: 10.1 % (ref 2–12)
NEUTROPHILS ABSOLUTE: 3.47 E9/L (ref 1.8–7.3)
NEUTROPHILS RELATIVE PERCENT: 59.2 % (ref 43–80)
PDW BLD-RTO: 13 FL (ref 11.5–15)
PHOSPHORUS: 3.5 MG/DL (ref 2.5–4.5)
PLATELET # BLD: 309 E9/L (ref 130–450)
PMV BLD AUTO: 8.9 FL (ref 7–12)
POTASSIUM SERPL-SCNC: 4.1 MMOL/L (ref 3.5–5)
PROTHROMBIN TIME: 12.5 SEC (ref 9.3–12.4)
RBC # BLD: 3.73 E12/L (ref 3.8–5.8)
SODIUM BLD-SCNC: 136 MMOL/L (ref 132–146)
TOTAL PROTEIN: 7.2 G/DL (ref 6.4–8.3)
WBC # BLD: 5.9 E9/L (ref 4.5–11.5)

## 2023-01-24 PROCEDURE — 83735 ASSAY OF MAGNESIUM: CPT

## 2023-01-24 PROCEDURE — 80053 COMPREHEN METABOLIC PANEL: CPT

## 2023-01-24 PROCEDURE — 85610 PROTHROMBIN TIME: CPT

## 2023-01-24 PROCEDURE — 94640 AIRWAY INHALATION TREATMENT: CPT

## 2023-01-24 PROCEDURE — 84100 ASSAY OF PHOSPHORUS: CPT

## 2023-01-24 PROCEDURE — 97166 OT EVAL MOD COMPLEX 45 MIN: CPT

## 2023-01-24 PROCEDURE — 99233 SBSQ HOSP IP/OBS HIGH 50: CPT | Performed by: NURSE PRACTITIONER

## 2023-01-24 PROCEDURE — 6370000000 HC RX 637 (ALT 250 FOR IP): Performed by: INTERNAL MEDICINE

## 2023-01-24 PROCEDURE — 97530 THERAPEUTIC ACTIVITIES: CPT

## 2023-01-24 PROCEDURE — 82330 ASSAY OF CALCIUM: CPT

## 2023-01-24 PROCEDURE — 6370000000 HC RX 637 (ALT 250 FOR IP)

## 2023-01-24 PROCEDURE — 97535 SELF CARE MNGMENT TRAINING: CPT

## 2023-01-24 PROCEDURE — 85025 COMPLETE CBC W/AUTO DIFF WBC: CPT

## 2023-01-24 PROCEDURE — 2580000003 HC RX 258: Performed by: PSYCHIATRY & NEUROLOGY

## 2023-01-24 PROCEDURE — 6370000000 HC RX 637 (ALT 250 FOR IP): Performed by: PSYCHIATRY & NEUROLOGY

## 2023-01-24 PROCEDURE — 2580000003 HC RX 258

## 2023-01-24 PROCEDURE — 1200000000 HC SEMI PRIVATE

## 2023-01-24 RX ADMIN — ARFORMOTEROL TARTRATE 15 MCG: 15 SOLUTION RESPIRATORY (INHALATION) at 08:31

## 2023-01-24 RX ADMIN — Medication 1 TABLET: at 09:02

## 2023-01-24 RX ADMIN — FOLIC ACID 1 MG: 1 TABLET ORAL at 09:01

## 2023-01-24 RX ADMIN — Medication 100 MG: at 09:01

## 2023-01-24 RX ADMIN — ARFORMOTEROL TARTRATE 15 MCG: 15 SOLUTION RESPIRATORY (INHALATION) at 20:18

## 2023-01-24 RX ADMIN — SODIUM CHLORIDE, PRESERVATIVE FREE 10 ML: 5 INJECTION INTRAVENOUS at 09:02

## 2023-01-24 RX ADMIN — TICAGRELOR 90 MG: 90 TABLET ORAL at 09:01

## 2023-01-24 RX ADMIN — Medication 10 ML: at 09:02

## 2023-01-24 RX ADMIN — PANTOPRAZOLE SODIUM 40 MG: 40 TABLET, DELAYED RELEASE ORAL at 05:06

## 2023-01-24 RX ADMIN — BUDESONIDE 500 MCG: 0.5 SUSPENSION RESPIRATORY (INHALATION) at 08:31

## 2023-01-24 RX ADMIN — ASPIRIN 81 MG CHEWABLE TABLET 81 MG: 81 TABLET CHEWABLE at 09:01

## 2023-01-24 RX ADMIN — BUDESONIDE 500 MCG: 0.5 SUSPENSION RESPIRATORY (INHALATION) at 20:18

## 2023-01-24 ASSESSMENT — PAIN SCALES - GENERAL
PAINLEVEL_OUTOF10: 0

## 2023-01-24 NOTE — PROGRESS NOTES
Speech Language Pathology      NAME:  David Cole  :  1955  DATE: 2023  [facility]    Pt seen for ongoing dysphagia management. Alert, seated upright in chair feeding himself without difficulty. Pt did voice complaints of pureed textures, SLP explained plan for MBSS to fully assess oropharyngeal swallow function and adjust diet as warranted. Further explained that d/t location of cva, further assessment warranted to ensure aspiration or pharyngeal residue is not present. Pt and sister both voiced an understanding. Discussed voice intervention with Pt. Will hold on ENT referral at this time- will assess airway protection via MBSS to marco antonio VF movement. Voice remains dysphonic however stronger from initial eval. Speech is clear. Education provided r/t VF function and its relation to swallow function. Pt's sister reported that Pt was able to recall all information to assist in her managing his finances with no difficulty. Will cont to follow.      Acute ischemic stroke [facility]) [I63.9]    31084  speech/language tx  65189  dysphagia tx    Annalisa Tracy M.S., CCC-SLP  Speech-Language Pathologist  [id number]  2023

## 2023-01-24 NOTE — CARE COORDINATION
Care Coordination: LOS 4. Acute stroke. Room air, therapy and [facility] are following. PMR consult completed. Per sister, first choice is [facility] here, 2nd [address], [address] [place] [address]. She hopes that he will qualify for [address] here. Pt has transfer out. Addendum: spoke to Jame Jasmine form [address], will follow for next day for progression, since he does not have 24/7 are at home. Back up plan is [facility] with intent to [address] vs [facility]. Referral given to Tita Blue for 2nd and 3rd choice. Yasmin Morgan    Addendum: [facility] out of network but [facility] can accept if [address] declines. Otx to see this afternoon, Speech is following.     Yasmin Morgan

## 2023-01-24 NOTE — PROGRESS NOTES
Met with patient and sister at bedside. I explained the ARU program. He lives in a 2 story home with bed on the 1st floor and bath on the 2nd floor. His sister will get groceries for him but cannot provide any hands on care if necessary upon discharge and he does not have anyone to stay with him if supervision is necessary upon discharge. OT is pending.    Merideth Saint RN  ARU Pre-screener/  [phone]

## 2023-01-24 NOTE — CARE COORDINATION
Care Coordination: discussed with Ambika Bess at [facility] Plan to review tomorrow evals to see improvement. Pt does not have 24/7 at home, sister can only assist. Pt with much improvement overnight. If  after tomorrow evals pt does not meet [facility] then back up plan is [facility]. [facility] will accept if not able to go to [facility] 7000 started, ambulance form completed in [facility] Sharan Silva

## 2023-01-24 NOTE — PLAN OF CARE
Problem: Chronic Conditions and Co-morbidities  Goal: Patient's chronic conditions and co-morbidity symptoms are monitored and maintained or improved  1/24/2023 0814 by Xiomara Spence RN  Outcome: Progressing  Flowsheets (Taken 1/24/2023 0800)  Care Plan - Patient's Chronic Conditions and Co-Morbidity Symptoms are Monitored and Maintained or Improved: Monitor and assess patient's chronic conditions and comorbid symptoms for stability, deterioration, or improvement  1/23/2023 2010 by Melyssa Boucher RN  Outcome: Progressing  Flowsheets  Taken 1/23/2023 2000 by Melyssa Boucher RN  Care Plan - Patient's Chronic Conditions and Co-Morbidity Symptoms are Monitored and Maintained or Improved: Monitor and assess patient's chronic conditions and comorbid symptoms for stability, deterioration, or improvement  Taken 1/23/2023 0800 by Celestino Blanca 34 - Patient's Chronic Conditions and Co-Morbidity Symptoms are Monitored and Maintained or Improved: Monitor and assess patient's chronic conditions and comorbid symptoms for stability, deterioration, or improvement     Problem: Discharge Planning  Goal: Discharge to home or other facility with appropriate resources  1/24/2023 0814 by Xiomara Spence RN  Outcome: Progressing  Flowsheets (Taken 1/24/2023 0800)  Discharge to home or other facility with appropriate resources: Identify barriers to discharge with patient and caregiver  1/23/2023 2010 by Melyssa Boucher RN  Outcome: Progressing  Flowsheets  Taken 1/23/2023 2000 by Melyssa Boucher RN  Discharge to home or other facility with appropriate resources: Identify barriers to discharge with patient and caregiver  Taken 1/23/2023 0800 by BERHANE López RN  Discharge to home or other facility with appropriate resources: Identify barriers to discharge with patient and caregiver     Problem: ABCDS Injury Assessment  Goal: Absence of physical injury  1/24/2023 0814 by Xiomara Spence RN  Outcome: Progressing  1/23/2023 2010 by Melyssa Boucher RN  Outcome: Progressing     Problem: Safety - Adult  Goal: Free from fall injury  1/24/2023 0814 by Xiomara Spence RN  Outcome: Progressing  1/23/2023 2010 by Melyssa Boucher RN  Outcome: Progressing     Problem: Pain  Goal: Verbalizes/displays adequate comfort level or baseline comfort level  1/24/2023 0814 by Xiomara Spence RN  Outcome: Progressing  Flowsheets  Taken 1/24/2023 0800 by Xiomara Spence RN  Verbalizes/displays adequate comfort level or baseline comfort level:   Encourage patient to monitor pain and request assistance   Assess pain using appropriate pain scale  Taken 1/24/2023 0600 by Angely Whittaker RN  Verbalizes/displays adequate comfort level or baseline comfort level: Encourage patient to monitor pain and request assistance  Taken 1/24/2023 0400 by Angely Whittaker RN  Verbalizes/displays adequate comfort level or baseline comfort level: Encourage patient to monitor pain and request assistance  1/23/2023 2010 by Melyssa Boucher RN  Outcome: Progressing  Flowsheets (Taken 1/23/2023 0800 by Xiomara Spence RN)  Verbalizes/displays adequate comfort level or baseline comfort level: Encourage patient to monitor pain and request assistance     Problem: Skin/Tissue Integrity  Goal: Absence of new skin breakdown  Description: 1. Monitor for areas of redness and/or skin breakdown  2. Assess vascular access sites hourly  3. Every 4-6 hours minimum:  Change oxygen saturation probe site  4. Every 4-6 hours:  If on nasal continuous positive airway pressure, respiratory therapy assess nares and determine need for appliance change or resting period.   1/24/2023 0814 by Xiomara Spence RN  Outcome: Progressing  1/23/2023 2010 by Melyssa Boucher RN  Outcome: Progressing

## 2023-01-24 NOTE — PLAN OF CARE
Problem: Discharge Planning  Goal: Discharge to home or other facility with appropriate resources  Outcome: Progressing  Flowsheets  Taken 1/23/2023 2000 by Shalini Talley RN  Discharge to home or other facility with appropriate resources: Identify barriers to discharge with patient and caregiver  Taken 1/23/2023 0800 by BERHANE Dotson RN  Discharge to home or other facility with appropriate resources: Identify barriers to discharge with patient and caregiver     Problem: Chronic Conditions and Co-morbidities  Goal: Patient's chronic conditions and co-morbidity symptoms are monitored and maintained or improved  Outcome: Progressing  Flowsheets  Taken 1/23/2023 2000 by Celestino Haque - Patient's Chronic Conditions and Co-Morbidity Symptoms are Monitored and Maintained or Improved: Monitor and assess patient's chronic conditions and comorbid symptoms for stability, deterioration, or improvement  Taken 1/23/2023 0800 by Celestino Rios - Patient's Chronic Conditions and Co-Morbidity Symptoms are Monitored and Maintained or Improved: Monitor and assess patient's chronic conditions and comorbid symptoms for stability, deterioration, or improvement     Problem: ABCDS Injury Assessment  Goal: Absence of physical injury  Outcome: Progressing     Problem: Safety - Adult  Goal: Free from fall injury  Outcome: Progressing     Problem: Pain  Goal: Verbalizes/displays adequate comfort level or baseline comfort level  Outcome: Progressing  Flowsheets (Taken 1/23/2023 0800 by Kim Yancey RN)  Verbalizes/displays adequate comfort level or baseline comfort level: Encourage patient to monitor pain and request assistance     Problem: Skin/Tissue Integrity  Goal: Absence of new skin breakdown  Description: 1. Monitor for areas of redness and/or skin breakdown  2. Assess vascular access sites hourly  3. Every 4-6 hours minimum:  Change oxygen saturation probe site  4.   Every 4-6 hours: If on nasal continuous positive airway pressure, respiratory therapy assess nares and determine need for appliance change or resting period.   Outcome: Progressing

## 2023-01-24 NOTE — PROCEDURES
Patient will have their barium swallow tomorrow 1/25 due to the fluoro schedule being full through this afternoon.

## 2023-01-24 NOTE — PROGRESS NOTES
[facility] Quality Flow/Interdisciplinary Rounds Progress Note        Quality Flow Rounds held on January 24, 2023    Disciplines Attending:  Bedside nurse, charge nurse, , PT/OT, pharmacy, nursing unit leadership, , Medical residents     Deysi Thomas was admitted on 1/20/2023 12:29 AM    Anticipated Discharge Date:       Disposition:    Rudy Score:  Rudy Scale Score: 16    Readmission Risk              Risk of Unplanned Readmission:  11           Discussed patient goal for the day, patient clinical progression, and barriers to discharge.   The following Goal(s) of the Day/Commitment(s) have been identified:  possible transfer, pt/ot      Estela Massey RN  January 24, 2023

## 2023-01-24 NOTE — PROGRESS NOTES
Physical Therapy    Physical Therapy Daily Treatment Note      Name: Jordon Colindres  : 1955  MRN: [id number]      Date of Service: 2023    Evaluating PT:  Octaviano Will, PT, DPT  [id number]     Room #:  4410/4410-A  Diagnosis:  Acute ischemic stroke (HCC) [I63.9]  PMHx/PSHx:   has no past medical history on file.   Procedure/Surgery:  Left vertebral artery occlusion s/p thrombectomy and stent per Neuro IR   Precautions:  Falls, L coordination deficits, L visual field disturbance   Equipment Needs:  TBD    SUBJECTIVE:    Pt lives alone with his dog in a 2 story home with 2 stairs and 0 rail to enter.  Bedroom is on the 1st level and bathroom is on the 2n level with full flight of steps and rail.  Pt ambulated with no AD and independent PTA.    OBJECTIVE:   Initial Evaluation  Date: 23 Treatment  23 Short Term/ Long Term   Goals   AM-PAC 6 Clicks      Was pt agreeable to Eval/treatment? Yes  Yes     Does pt have pain? No c/o pain  No c/o pain     Bed Mobility  Rolling: NT  Supine to sit: NT  Sit to supine: Mod A  Scooting: Mod A Rolling: Min A  Supine to sit: Min A  Sit to supine: NT  Scooting: Min A to EOB  Rolling: SBA  Supine to sit: SBA  Sit to supine: SBA  Scooting: SBA   Transfers Sit to stand: Max A  Stand to sit: Max A  Stand pivot: Max A with no AD Sit to stand: Mod A  Stand to sit: Mod A  Stand pivot: Mod A with no AD Sit to stand: SBA  Stand to sit: SBA  Stand pivot: Min A with AAD   Ambulation    Few feet with FWW Max A > Max A x2 with LOB  20 feet with FWW mod A > max A with fatigue  >50 feet with AAD Min A   Stair negotiation: ascended and descended  NT NT > 2 steps with B rail Min A   ROM BUE:  WFL  BLE:  WFL     Strength BUE:  grossly 4+/5  BLE:  grossly 5/5     Balance Sitting EOB:  SBA  Dynamic Standing:  Max A  Sitting EOB:  SBA  Dynamic Standing:  Mod A  Sitting EOB:  SBA  Dynamic Standing:  Min A     Pt is A & O x 4  RASS:  0  CAM-ICU:  NT  Sensation:  Pt denies  numbness and tingling to extremities  Edema:  Unremarkable  Coordination:  LUE dysmetria noted    Vitals:  Blood Pressure at rest 125/77 mmHg  Blood Pressure post session 133/92 mmHg   Heart Rate at rest 80 bpm  Heart Rate post session 100 bpm    SPO2 at rest 98% on RA SPO2 post session 98% on RA       Therapeutic Exercises:    BLE AROM  STS x3 reps     Patient education  Pt educated on safety during mobility, standing balance/posture, gait training, up with assistance as tolerated     Patient response to education:   Pt verbalized understanding Pt demonstrated skill Pt requires further education in this area   Yes  Yes  Reinforce      ASSESSMENT:    Conditions Requiring Skilled Therapeutic Intervention:    []Decreased strength     []Decreased ROM  [x]Decreased functional mobility  [x]Decreased balance   [x]Decreased endurance   [x]Decreased posture  []Decreased sensation  [x]Decreased coordination   [x]Decreased vision  [x]Decreased safety awareness   []Increased pain       Comments:  Pt received sitting at EOB and agreeable to PT treatment with OT collab and pt's sister present. Pt cleared for participation by RN prior to session. Vitals monitored during session. Pt improved today. Much less dizzy and able to complete more functional mobility. Pt completed supine>sit with assistance of trunk and stand pivot to chair using FWW. Pt still demonstrating dysmetria of LUE while attempting to grasp FWW and reach back for chair. Pt able to ambulate using FWW with improved balance and improved gait distance. Pt did have L lateral lean which increased fatigue requiring verbal cues for proper ABA and weight distribution during gait. Pt cued on posture and weight shifting during gait as well. Chair follow utilized for safety. Pt left seated in chair with sister present. Rn aware. Pt left with call button in reach, lines attached, and needs met.     Pt demonstrates balance and coordination deficits that would be best addressed in an intensive rehab setting. Pt demonstrates good rehab potential.      Treatment:  Patient practiced and was instructed in the following treatment:    Bed mobility training - pt given verbal and tactile cues to facilitate proper sequencing and safety during sit>supine as well as provided with physical assistance to complete task    STS and pivot transfer training - pt educated on proper hand and foot placement, safety and sequencing, and use of FWW vs No AD to safely complete sit<>stand and pivot transfers with hands on assistance to complete task safely    Gait training- pt was given verbal and tactile cues to facilitate safety/balance, posture, ABA, weight shifting, FWW utilization, gait mechanics during ambulation as well as provided with physical assistance. PHYSICAL THERAPY PLAN OF CARE:    Pt is making good progress towards established goals. Continue PT POC.      Specific instructions for next treatment:  progress transfers and gait, recommend two person assistance for gait safety d/t dizziness and LOB    Time in  0855  Time out  0935    Total Treatment Time  40 minutes     CPT codes:  [] Low Complexity PT evaluation 01384  [] Moderate Complexity PT evaluation 47843  [] High Complexity PT evaluation 32855  [] PT Re-evaluation 57479  [] Gait training 80839 -- minutes  [] Manual therapy 93469 -- minutes  [x] Therapeutic activities 15372 40 minutes  [] Therapeutic exercises 41488 - minutes  [] Neuromuscular reeducation 53820 -- minutes     Yuliya Willoughby, PT, DPT  [id number]

## 2023-01-24 NOTE — PROGRESS NOTES
Kiko Beach is a 78 y.o. right handed male     Neurology following for stroke    PMH of none on chart    Posterior circulation symptoms including bilateral dysmetria gait ataxia that started on 1/17/2023  had went to the ER at outside hospital 1/20/2023 had been there the whole day and later that night multiple team members were contacted eventually neurology discussed the case with Dr. Nicki Thibodeaux who agreed on the heparin drip to maximize medical therapy due to the presence of a large vertebral thrombus on report please note no images were available for us to review as this is the outside [facility] hospital.      Neurology evaluation morning of 1/21/23 found diplopia was noticed including 6 cranial nerve palsy. Patient seems to have a bilateral facial droop also. MRI brain was emergently obtained which showed that there was extensive infarction in bilateral cerebral hemispheres. However given the symptoms and review of his images that were obtained and ultimately determined that intervention would be a lifesaving therapy discussed with family and the patient and they both verbalized understanding they understand the risk and benefits and agreed to intervention. Left vertebral artery thrombosis from V2 to V4 segment successfully removed with complete stent of left vertebral artery with left and right PCA thrombectomy. Currently on Brilinta 90 mg BID, aspirin and statin. Pt sitting in chair at bedside with sister at bedside. Pt doing well overall he hopes to go to ARU for therapy. Addressed questions and concerns.       No chest pain or palpitations  No coughing or wheezing    No vertigo, lightheadedness or loss of consciousness  No falls, tripping or stumbling  No incontinence of bowels or bladder  No itching or bruising appreciated  No numbness, tingling or focal arm/leg weakness    ROS otherwise negative      Objective:     BP (!) 139/96   Pulse 85   Temp 98.4 °F (36.9 °C)   Resp 16   Ht 6' (1.829 m)   Wt 150 lb (68 kg)   SpO2 98%   BMI 20.34 kg/m²     General appearance: alert, appears stated age, cooperative and no distress  Head: normocephalic, without obvious abnormality, atraumatic  Eyes: conjunctivae/corneas clear  Neck: supple, symmetrical, trachea midline   Lungs: respirations non labored   Heart: regular rate and rhythm on monitor   Skin: color, texture, turgor normal---no rashes or lesions      Mental Status: Alert, oriented, thought content appropriate     Appropriate attention/concentration  Intact fundus of knowledge  Repetition intact  Intact memories      Speech: mild dysarthria  Language: no aphasia     Cranial Nerves:  I: smell NA   II: visual acuity  NA   II: visual fields Left HH    II: pupils [place]   III,VII: ptosis None   III,IV,VI: extraocular muscles  Full ROM   V: mastication Normal   V: facial light touch sensation  Normal   V,VII: corneal reflex     VII: facial muscle function - upper  Normal   VII: facial muscle function - lower Normal   VIII: hearing Normal   IX: soft palate elevation  Normal   IX,X: gag reflex    XI: trapezius strength  5/5   XI: sternocleidomastoid strength 5/5   XI: neck extension strength  5/5   XII: tongue strength  Normal     Motor:  5/5 throughout  Normal bulk and tone  No drift   No abnormal movements    Sensory:  LT normal    Coordination:   FN, FFM and SAEED normal on the right and left dysmetric     DTR:   2+ throughout     Laboratory/Radiology:     CBC with Differential:    Lab Results   Component Value Date/Time    WBC 5.9 01/24/2023 04:03 AM    RBC 3.73 01/24/2023 04:03 AM    HGB 11.6 01/24/2023 04:03 AM    HCT 33.5 01/24/2023 04:03 AM     01/24/2023 04:03 AM    MCV 89.8 01/24/2023 04:03 AM    MCH 31.1 01/24/2023 04:03 AM    MCHC 34.6 01/24/2023 04:03 AM    RDW 13.0 01/24/2023 04:03 AM    LYMPHOPCT 28.0 01/24/2023 04:03 AM    MONOPCT 10.1 01/24/2023 04:03 AM    BASOPCT 0.5 01/24/2023 04:03 AM    MONOSABS 0.59 01/24/2023 04:03 AM LYMPHSABS 1.64 01/24/2023 04:03 AM    EOSABS 0.12 01/24/2023 04:03 AM    BASOSABS 0.03 01/24/2023 04:03 AM     CMP:    Lab Results   Component Value Date/Time     01/24/2023 04:03 AM    K 4.1 01/24/2023 04:03 AM    K 3.8 01/20/2023 04:25 AM     01/24/2023 04:03 AM    CO2 23 01/24/2023 04:03 AM    BUN 10 01/24/2023 04:03 AM    CREATININE 0.7 01/24/2023 04:03 AM    LABGLOM >60 01/24/2023 04:03 AM    GLUCOSE 110 01/24/2023 04:03 AM    PROT 7.2 01/24/2023 04:03 AM    LABALBU 4.0 01/24/2023 04:03 AM    CALCIUM 9.1 01/24/2023 04:03 AM    BILITOT 0.5 01/24/2023 04:03 AM    ALKPHOS 116 01/24/2023 04:03 AM    AST 29 01/24/2023 04:03 AM    ALT 25 01/24/2023 04:03 AM     Hepatic Function Panel:    Lab Results   Component Value Date/Time    ALKPHOS 116 01/24/2023 04:03 AM    ALT 25 01/24/2023 04:03 AM    AST 29 01/24/2023 04:03 AM    PROT 7.2 01/24/2023 04:03 AM    BILITOT 0.5 01/24/2023 04:03 AM    LABALBU 4.0 01/24/2023 04:03 AM     HgBA1c:    Lab Results   Component Value Date/Time    LABA1C 5.1 01/20/2023 04:25 AM     FLP:    Lab Results   Component Value Date/Time    TRIG 69 01/20/2023 04:25 AM    HDL 37 01/20/2023 04:25 AM    LDLCALC 74 01/20/2023 04:25 AM    LABVLDL 14 01/20/2023 04:25 AM     CT HEAD WO CONTRAST  Result Date: 1/20/2023  Subacute infarction localized in the inferior left cerebellar hemisphere in the PICA distribution. No hemorrhage. Chronic parenchymal change including atrophy and old white matter ischemia. MRI BRAIN WO CONTRAST  Result Date: 1/20/2023  1. Bilateral acute/subacute infarcts including left PICA infarct and small lacunar infarct in the right cerebellum, similar compared to the prior study. 2. Probable area of old petechial hemorrhage in the right lateral ezekiel. 3. Otherwise, no new acute intracranial abnormality seen. 4. Chronic sinus disease.                  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 Pati Muniz, APRN - CNP  2:50 PM  1/24/2023

## 2023-01-24 NOTE — PROGRESS NOTES
[address]       Date:2023                                                               Patient Name: Kiko Beach  MRN: [id number]  : 1955  Room: [address] [place] [address]A       Evaluating OT: Jennyfer Patricio OTR/L; [id number]     Referring Provider: FLAVIA Vila CNP   Specific Provider Orders/Date: OT eval and treat (23)       Diagnosis: Acute ischemic stroke [facility]) [I63.9]     Reason for admission: Pt presenting with difficulty ambulating, garbled speech, & facial droop. Surgery/Procedures: 23 IR TRANSCATHETER INTRAVASCULAR UPPER PERIPH STENT INTRO, IR INTRACRANIAL STENT(S), IR MECHANICAL ART THROMBECTOMY INIT     Pertinent Medical History:    History reviewed. No pertinent past medical history. *Precautions:  Fall Risk, L lateral lean, LUE dysmetria, seizure precautions, dysphagia diet - pureed & moderately thick (honey), L visual deficits (none noted upon eval)    Assessment of current deficits   [x] Functional mobility  [x]ADLs  [x] Strength               []Cognition   [x] Functional transfers   [x] IADLs         [x] Safety Awareness   [x]Endurance   [x] Fine Coordination        [] ROM     [] Vision/perception   []Sensation    [x]Gross Motor Coordination [x] Balance   [] Delirium                  [x]Motor Control     [] Communication    OT PLAN OF CARE   OT POC based on physician orders, patient diagnosis and results of clinical assessment.        Frequency/Duration: 3-5 days/wk for 1-2 weeks PRN    Specific OT Treatment Interventions to include:   * Instruction/training on adapted ADL techniques and AE recommendations to increase functional independence within precautions       * Training on energy conservation strategies, correct breathing pattern and techniques to improve independence/tolerance for self-care routine  * Functional transfer/mobility training/DME recommendations for increased independence, safety, and fall prevention  * Patient/Family education to increase follow through with safety techniques and functional independence  * Recommendation of environmental modifications for increased safety with functional transfers/mobility and ADLs  * Sensory re-education to improve body/limb awareness, maintain/improve skin integrity, and improve hand/UE motor function  * Therapeutic exercise to improve motor endurance, ROM, and functional strength for ADLs/functional transfers  * Therapeutic activities to facilitate/challenge dynamic balance, stand tolerance for increased safety and independence with ADLs  * Therapeutic activities to facilitate gross/fine motor skills for increased independence with ADLs  * Neuro-muscular re-education: facilitation of righting/equilibrium reactions, midline orientation, scapular stability/mobility, normalization of muscle tone, and facilitation of volitional active controled movement  * Positioning to improve skin integrity, interaction with environment and functional independence    Modified Jillian Scale   Score     Description  0             No symptoms  1             No significant disability despite symptoms  2             Slight disability; able to look after own affairs  3             Moderate disability; able to ambulate without assist/ requires assist with ADLs  4             Moderate/Severe disability;requires assist to ambulate/assist with ADLs  5             Severe disability;bedridden/incontinent   6               Score:   4    Recommended Adaptive Equipment: TBD pending progress     Home Living: Pt lives alone in a 2 story home with 2 step(s) to enter and 0 rail(s); bed/bath on 1st floor. [facility] [facility] None    Prior Level of Function: IND with ADLs;  IND with IADLs. No use of AD for functional mobility.    Driving: Yes  Occupation: Caring for pet dog    Pain Level: pt c/o 0/10 pain this session    Cognition: A&O: 4/4 ; Follows 1 step commands   Memory: Good   Comprehension: Fair   Problem solving: Fair   Judgement/safety: Fair-               Communication skills: WFL           Vision: WFL               Glasses: Yes                                                   Hearing: WFL     RASS: 0   CAM-ICU: (NT) Delirium     Functional Assessment:  AM-PAC Daily Activity Raw Score: 13/24   Initial Eval Status  Date: 1/24/23 Treatment Status  Date:  STGs = LTGs  Time frame: 7-14 days   Feeding Min A  Decreased FMC LUE                      Independent       Grooming Mod A overall/Min A  Pt able to wash face with RUE seated EOB. Modified [place]        UB dressing/bathing Mod A  Decreased coordination of LUE during participation of light bathing task seated EOB. SBA       LB dressing/bathing Max A  To don/doff socks seated EOB with hand over hand assist of LUE for [facility]. SBA        Toileting Max A                      SBA     Bed Mobility  Supine to sit:   Min A    Sit to supine:   NT                      Modified [place]     Functional Transfers Sit to stand: Mod A    Stand to sit:   Mod A    Stand Pivot: Mod A                      SBA     Functional Mobility Mod A progressing to Max A w/ fatigue - increased L lateral lean w/ use of ww. SBA with use of Appropriate AD        Balance Sitting:     Static:  SBA    Dynamic:Min A  Standing: Mod A w/ ww  Sitting:     Static:  Independent    Dynamic:Independent  Standing: SBA                   Endurance/Activity Tolerance   Fair tolerance with light activity. Good   Visual/  Perceptual Appears WFL - no c/o blurry vision at this time.            Vitals:   HR at rest: 79 bpm HR at end of session: 96 bpm   SpO2 at rest: 96% SpO2 at end of session 98%   BP at rest: 125/77 mmHg BP at end of session 133/92 mmHg     UE Assessment:  Hand Dominance: Right [x]  Left []     ROM Strength STM goal: PRN   RUE  WFL 4+/5  : Fair+  FMC: Fair+  GMC: Fair+ Improve overall RUE strength WFL for participation in functional tasks       LUE WFL 4+/5  : Fair+  FMC: Poor  GMC: Poor  Noted dysmetria of LUE. Improve overall LUE strength/coordination WFL for participation in functional tasks           Sensation: No c/o numbness/tingling in allie extremities. Tone: WNL   Edema: Unremarkable    Treatment: OT treatment provided this date includes:     Instruction/training on safety and adapted techniques for completion of ADLs:  to increase independence and safety in self-care. Pt sat EOB to engage in ADLs while addressing static/dynamic sitting balance, proprioception, & coordination of the LUE. Pt required assist to tie gown seated EOB d/t decreased LUE FMC. Pt engage in light bathing task requiring assist to manage wash cloth in LUE. Pt attempting to don/doff socks seated EOB with increased assist to maintain dynamic sitting balance d/t L lateral lean & manipulate sock in LUE. Instruction/training on safe bed mobility/functional mobility/transfer techniques: with focus on safety, body mechanics, and precautions. Instruction/training on energy conservation/work simplification for completion of ADLs techniques to increase independence with self-care ADLs and IADLs, work simplification to improve endurance. Proper Positioning/Alignment for optimal healing, skin integrity, to prevent breakdown, decrease edema, reduce risk of contracture, and encourage functional positioning for interaction with environment. Skilled Monitoring of Vitals: to include BP, spO2, and HR throughout session to maximize safety. Sitting/Standing Balance/Tolerance: to increase balance and activity tolerance during ADLs and facilitate proper posture and positioning.  Pt noted with L lateral lean (increasing w/ fatigue) requiring assist/cues to maintain midline/upright posture. Therapeutic activity: to challenge dynamic sitting/standing balance and endurance to promote safety during ADL tasks and functional transfers and mobility. Line management and environmental modifications made prior to and end of session to ensure patient safety and to increase efficiency of session. Skilled monitoring of HR, O2 saturation, blood pressure and patient's response to activity performed throughout session. Comments: Pt case discussed in rounds, OK from RN to see patient. Upon arrival, patient supine in bed & agreeable to OT session with sister in room. Pt pleasant and agreeable to participate in therapy session. Pt demo'd fair tolerance with understanding of education/techniques. Therapist facilitated ADL tasks, functional transfers, functional mobility, bed mobility to address safety awareness, implementation of fall prevention strategies, & engagement throughout functional tasks. At end of session, patient properly positioned in seated in bedside chair with call light within reach, all lines and tubes intact. Pt instructed on use of call light for assistance and fall prevention. Nursing notified of patient positioning. Patient presents with decreased ROM/strength, activity tolerance, dynamic balance, functional mobility limiting completion of ADLs and safety. Pt can benefit from continued skilled OT services to increase safety, functional independence and quality of life. Rehab Potential: Good for established goals    Patient / Family Goal: to return to PLOF    Patient and/or family were instructed/educated on diagnosis, prognosis/goals and plan of care. Pt demonstrated fair understanding. Evaluation Complexity: Moderate    History: Expanded chart review of consults, imaging, and psychosocial history related to current functional performance.    Exam: 5+ performance deficits identified limiting functional independence and safe return home   Assistance/Modification: Min/mod assistance or modifications required to perform tasks. May have comorbidities that affect occupational performance. Time In: 8:55a             Time Out: 9:35a         Total Treatment time: 25 minutes   Min Units   OT Eval Low [id number]     OT Eval Medium [id number] X    OT Eval High [id number]     OT Re-Eval [id number]     Therapeutic Ex 01135     Therapeutic Activities 66016 10 1   ADL/Self Care 95384 15 1   Orthotic Management [id number]     Neuro Re-Ed [id number]     Non-Billable Time        Evaluation time includes thorough review of current medical information, gathering information on past medical history/social history and prior level of function, completion of standardized testing/informal observation of tasks, assessment of data and development of POC/Goals.         Evaluating OT: Collie Lipoma OTR/L; [id number]

## 2023-01-24 NOTE — PROGRESS NOTES
[address]             Pulmonary & Critical Care Medicine                MICU Progress Note                 Written by: Carolina Moore MD  Name: Fermin Hale : 1955       Age: 78 y.o. MR/Act #    : [id number],  Billing  #    : [id number]   Admit Date: 2023 12:29 AM LOS: 4,   Hospital Day: 5 Room #      : 5856/[id number]   PCP            : Rosalind Augustine MD,   Referred by: Nguyễn Mcdaniel MD ICU Attending: MD Eladia Mccollum date: 2023 9:12 AM   ICU Days:       4 Vent Days:   0 LOS: 4,                          Reason for ICU admission         Cerebellar Stroke                         Brief HPI, Presentation & Synopsis                       78 y.o. male who was admitted to [facility] after transferred from an outside facility after presenting with difficulty walking, garbled speech and a facial droop. 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 S/p thrombectomy and complete reconstruction of vertebral artery and placement of 5 stents. Patient was placed on CIWA protocol and was found to have alcohol withdrawal.  He was transiently on Precedex drip now off Precedex drip.   Admitted in MICU for vertebral artery dissection, cerebral stroke and alcohol withdrawal.  Active problem list of yesterday:  Active Hospital Problems    Diagnosis Date Noted    Sinus bradycardia [R00.1] 2023     Priority: Medium    Cerebellar stroke ([address] [I63.9] 2023     Priority: Medium    Vertebral artery dissection ([address] [I77.74] 2023     Priority: Medium    Chronic obstructive pulmonary disease ([address] [J44.9] 2023     Priority: Medium                           Events of last night                      No overnight events                      Subjective   2023               Afebrile, hemodynamically stable                      Objective  2023               Vitals:    23 0900   BP: 125/77   Pulse: 75 Resp: 15   Temp:    SpO2: 98%     Temperature range : Temp  Av.4 °F (36.9 °C)  Min: 98 °F (36.7 °C)  Max: 98.9 °F (37.2 °C)  Pulse rate range      : Pulse  Av.8  Min: 65  Max: 90  Respiration range   : Resp  Av.4  Min: 11  Max: 21  BP range                  : Systolic (96YKF), UMN:083 , Min:111 , LTL:481   ; Diastolic (68QNB), FHK:71, Min:71, Max:95    Pulse Ox range : SpO2  Av.7 %  Min: 95 %  Max: 98 %    Arterial BP       Systolic BP/Diastolic BP & MAP   ABP (Arterial line BP): (!) 300/300   ABP Mean (Arterial Line Mean): 300 mmHg  I/O - 24hr    Intake/Output Summary (Last 24 hours) at 2023 0912  Last data filed at 2023 0800  Gross per 24 hour   Intake 114.04 ml   Output 450 ml   Net -335.96 ml     BMI: Body mass index is 20.34 kg/m². WEIGHT:  Patient Vitals for the past 96 hrs (Last 3 readings):   Weight   23 0600 150 lb (68 kg)     Wt Readings from Last 2 Encounters:   23 150 lb (68 kg)       Weight change:     Physical Examination  1. General: Well-nourished well-developed. Currently with no distress. 2. Eyes: Vision - grossly normal, PERRLA   3. HENT: Head is atraumatic & normocephalic. Neck Supple, No jugular venous distention   4. Respiratory: Lungs are clear to auscultation, Respirations are non-labored, Breath sounds are equal   5. Cardiovascular: S1, S2 normal, Regular rate & rhythm, No murmur, No pedal edema   6. Gastrointestinal: Soft, Non-tender, Non-distended, Normal bowel sounds. No organomegaly. 7. Neurologic: Continues to have dysmetria of left hand. Denies blurred vision at this time. Face symmetrical.  8. Skin: no rashes, breakdown  9. Musculoskeletal: no LE edema, no joint effusion.   10. Psychiatric -calm and cooperative           Medications, Allergies, Nutrition, Immunizations                    Continuous Infusions:   sodium chloride       Scheduled Meds:    thiamine  100 mg Oral Daily    folic acid  1 mg Oral Daily    aspirin  81 mg Oral Daily    pantoprazole  40 mg Oral QAM AC    atorvastatin  80 mg Oral Nightly    sodium chloride flush  5-40 mL IntraVENous 2 times per day    multivitamin  1 tablet Oral Daily    arformoterol tartrate  15 mcg Nebulization BID    budesonide  0.5 mg Nebulization BID    sodium chloride flush  5-40 mL IntraVENous 2 times per day    ticagrelor  90 mg Oral BID      Current Facility-Administered Medications   Medication Dose Route Frequency Provider Last Rate Last Admin    thiamine tablet 100 mg  100 mg Oral Daily Synetta Apa, APRN - CNP   100 mg at 71/78/51 9550    folic acid (FOLVITE) tablet 1 mg  1 mg Oral Daily Synetta Apa, APRN - CNP   1 mg at 01/24/23 0901    aspirin chewable tablet 81 mg  81 mg Oral Daily Lindsey M Howsare, DO   81 mg at 01/24/23 0901    pantoprazole (PROTONIX) tablet 40 mg  40 mg Oral QAM AC Lnidsey BLOUNT Howsare, DO   40 mg at 01/24/23 0506    ondansetron (ZOFRAN-ODT) disintegrating tablet 4 mg  4 mg Oral Q8H PRN Synetta Apa, APRN - CNP        Or    ondansetron (ZOFRAN) injection 4 mg  4 mg IntraVENous Q6H PRN Synetta Apa, APRN - CNP        polyethylene glycol (GLYCOLAX) packet 17 g  17 g Oral Daily PRN Synetta Apa, APRN - CNP        perflutren lipid microspheres (DEFINITY) injection 1.5 mL  1.5 mL IntraVENous ONCE PRN Synetta Apa, APRN - CNP        atorvastatin (LIPITOR) tablet 80 mg  80 mg Oral Nightly Synetta Apa, APRN - CNP   80 mg at 01/23/23 1958    hydrALAZINE (APRESOLINE) injection 10 mg  10 mg IntraVENous Q6H PRN Synetta Apa, APRN - CNP   10 mg at 01/20/23 2325    sodium chloride flush 0.9 % injection 5-40 mL  5-40 mL IntraVENous 2 times per day Synetta Apa, APRN - CNP   10 mL at 01/24/23 0902    sodium chloride flush 0.9 % injection 5-40 mL  5-40 mL IntraVENous PRN Synetta Apa, APRN - CNP   10 mL at 01/20/23 1319    multivitamin 1 tablet  1 tablet Oral Daily Synetta Apa, APRN - CNP   1 tablet at 01/24/23 1695 arformoterol tartrate (BROVANA) nebulizer solution 15 mcg  15 mcg Nebulization BID Maribel GABE PatelN - CNP   15 mcg at 01/24/23 0831    budesonide (PULMICORT) nebulizer suspension 500 mcg  0.5 mg Nebulization BID Maribel Amanda APRN - CNP   500 mcg at 01/24/23 0831    sodium chloride flush 0.9 % injection 5-40 mL  5-40 mL IntraVENous 2 times per day Dionne Richardson MD   10 mL at 01/24/23 0902    sodium chloride flush 0.9 % injection 5-40 mL  5-40 mL IntraVENous PRN Arun Tejada MD        0.9 % sodium chloride infusion   IntraVENous PRN Dionne Richardson MD        acetaminophen (TYLENOL) tablet 650 mg  650 mg Oral Q4H PRN Dionne Richardson MD        ticagrelor (BRILINTA) tablet 90 mg  90 mg Oral BID Dionne Richardson MD   90 mg at 01/24/23 0901     PRN Meds      : ondansetron **OR** ondansetron, polyethylene glycol, perflutren lipid microspheres, hydrALAZINE, sodium chloride flush, sodium chloride flush, sodium chloride, acetaminophen  Nutrition         : Continue diet  Allergy            : Patient has no known allergies. Immunization      :   There is no immunization history on file for this patient.           Labs and Imaging Studies                  CBC  :  Recent Labs     01/22/23  0503 01/23/23  0435 01/24/23  0403   WBC 6.3 5.1 5.9   RBC 3.28* 3.16* 3.73*   HGB 10.1* 9.7* 11.6*   HCT 28.4* 27.6* 33.5*   MCV 86.6 87.3 89.8   MCH 30.8 30.7 31.1   MCHC 35.6* 35.1* 34.6*   RDW 13.0 13.1 13.0    243 309   MPV 8.9 9.0 8.9     Comprehensive :   Recent Labs     01/22/23  0503 01/23/23  0435 01/24/23  0403    135 136   K 3.6 3.6 4.1    103 101   CO2 19* 20* 23   BUN 10 6 10   CREATININE 0.6* 0.6* 0.7   GLUCOSE 92 101* 110*   CALCIUM 8.1* 8.2* 9.1   PROT 6.0* 5.8* 7.2   LABALBU 3.5 3.4* 4.0   BILITOT 0.6 0.6 0.5   ALKPHOS 96 94 116   AST 19 22 29   ALT 14 17 25     Cardiac : No results found for: CKTOTAL, CKMB, CKMBINDEX, TROPONINI  Lab Results   Component Value Date    PROBNP 650 (H) 01/20/2023     PRO-BNP : No results for input(s): BNP in the last 72 hours. BNP: No results for input(s): BNP in the last 72 hours. Lactic Acid : @BRIEFLAB(LACTA:3)    Lipase  : No results for input(s): LIPASE in the last 72 hours. Sed rate : No results found for: SEDRATE  CReactive Protein: No results found for: CRP  Globulins :   No results for input(s): IGG in the last 72 hours. No results for input(s): IGM in the last 72 hours. No results for input(s): IGA in the last 72 hours. No results for input(s): IGG1 in the last 72 hours. No results for input(s): IGG2 in the last 72 hours. No results for input(s): IGG3 in the last 72 hours. No results for input(s): IGG4 in the last 72 hours. Magnesium  Lab Results   Component Value Date/Time    MG 2.1 01/24/2023 04:03 AM     Phosphorus  Lab Results   Component Value Date/Time    PHOS 3.5 01/24/2023 04:03 AM     Ionized Calcium  Lab Results   Component Value Date/Time    CAION 1.31 01/24/2023 04:03 AM        COVID-19 Labs:  Lab Results   Component Value Date/Time    COVID19 Not Detected 01/20/2023 04:25 AM       Notable Cultures:    Blood cultures No results found for: BC No results for input(s): BC in the last 72 hours. No results for input(s): Chalmer Stacks in the last 72 hours. No results for input(s): LABURIN in the last 72 hours. Respiratory cultures No results found for: RESPCULTURE No results found for: LABGRAM  Urine No results found for: LABURIN  Legionella No results found for: LABLEGI  C Diff PCR No results found for: CDIFPCR  Wound culture/abscess: No results for input(s): WNDABS in the last 72 hours. Tip culture:No results for input(s): CXCATHTIP in the last 72 hours.   ------------------------------------------------------------  Organism No results found for: ORG  Aerobic No components found for: LABEARO  Anaerobic No results found for: LABRYAN  ------------------------------------------------------------  Strep pneumo No results found for: SPNEUAGU  HSV No results found for: HSVSRC  FLU No results found for: FLUA No results found for: FLUB  Acid fast No results found for: AFBSMEAR  Stool No results found for: CXST  Fungust No results found for: FUNGUSSMEAR  VRE screen No results found for: VRECX  MRSA scree No components found for: MRSACU  Ecoli O051:H7 No results found for: WOISE919  Giardia No results found for: GIAAGS  Rotavirus No results found for: ROTA  Fecal leukocytes No results found for: FECLEU  -----------------------------------------------------  Fecal occult blood: . No results for input(s): OCCULTBLDFEC in the last 72 hours. Occult blood screening: No results for input(s): OCCBS in the last 72 hours. Occult blood QC: No results for input(s): OBQC in the last 72 hours. ABGs:   No results for input(s): PH, PCO2, PO2, HCO3, BE, O2SAT in the last 72 hours. VENT SETTINGS (Comprehensive) (if applicable): Jordan Carlos Enrique Additional Respiratory Assessments  Heart Rate: 75  Resp: 15  SpO2: 98 %  Swallow: Normal - able to swallow solids  . Imagery last 7 days  CT HEAD WO CONTRAST    Result Date: 1/21/2023  EXAMINATION: CT OF THE HEAD WITHOUT CONTRAST  1/21/2023 12:49 am TECHNIQUE: CT of the head was performed without the administration of intravenous contrast. Automated exposure control, iterative reconstruction, and/or weight based adjustment of the mA/kV was utilized to reduce the radiation dose to as low as reasonably achievable. COMPARISON: 01/21/2023 HISTORY: ORDERING SYSTEM PROVIDED HISTORY: post vertebral artery thrombectomy and stenting TECHNOLOGIST PROVIDED HISTORY: To be performed 24 hours after first CT Head. Reason for exam:->post vertebral artery thrombectomy and stenting Has a \"code stroke\" or \"stroke alert\" been called? ->No What reading provider will be dictating this exam?->CRC FINDINGS: BRAIN/VENTRICLES: There is a stable zone of encephalomalacia in the left cerebellum along the posteroinferior cerebellar artery distribution. There is slight effacement of the sulci suggest underlying cerebral edema but no midline shift is observed. There is an evolving area of encephalomalacia involving the right posterior cerebral artery distribution with there is increased low density along the posterior inferior aspect of the right occipital lobe. The gray-white differentiation is maintained without evidence of an acute infarct. There is no evidence of hydrocephalus. ORBITS: The visualized portion of the orbits demonstrate no acute abnormality. SINUSES: The visualized paranasal sinuses reveals opacification of the right sphenoid sinus is well as multiple ethmoid air cells suggests chronic sinusitis. The mastoid air cells demonstrate no acute abnormality. SOFT TISSUES/SKULL:  No acute abnormality of the visualized skull or soft tissues. 1. Evolving infarct involving the right posterior cerebral artery distribution. 2. Stable infarct involving the left posteroinferior cerebellar artery distribution. .     CT HEAD WO CONTRAST    Result Date: 1/20/2023  EXAMINATION: CT OF THE HEAD WITHOUT CONTRAST  1/20/2023 12:42 am TECHNIQUE: CT of the head was performed without the administration of intravenous contrast. Automated exposure control, iterative reconstruction, and/or weight based adjustment of the mA/kV was utilized to reduce the radiation dose to as low as reasonably achievable. COMPARISON: None. HISTORY: ORDERING SYSTEM PROVIDED HISTORY: follow up ischemic stroke, AMS TECHNOLOGIST PROVIDED HISTORY: Reason for exam:->follow up ischemic stroke, AMS Has a \"code stroke\" or \"stroke alert\" been called? ->Yes What reading provider will be dictating this exam?->CRC FINDINGS: BRAIN/VENTRICLES: There is abnormal amorphous low attenuation obscuring architecture of the left cerebellar hemisphere. The features have high association with acute infarction associated with the posterior inferior cerebellar artery.  There is cerebral atrophy with associated ex vacuo dilatation of the ventricular system. There is mild ill-defined low-attenuation in the periventricular white matter, corona radiata, and centrum semiovale bilaterally which has association with age related microvascular white matter ischemic disease. There is mild atherosclerotic plaque in the bilateral carotid canal, carotid siphon, and cavernous internal carotid artery (<50%). There is no acute intracranial hemorrhage, mass effect, or midline shift. No abnormal extra-axial fluid collection. There is no evidence of hydrocephalus. ORBITS: The visualized portion of the orbits demonstrate no acute abnormality. SINUSES: There is mild polypoid perimucosal thickening in the right sphenoid sinus and posterior left-sided ethmoid air cells. The visualized mastoid air cells demonstrate no acute abnormality. SOFT TISSUES/SKULL:  No acute abnormality of the visualized skull or soft tissues. Subacute infarction localized in the inferior left cerebellar hemisphere in the PICA distribution. No hemorrhage. Chronic parenchymal change including atrophy and old white matter ischemia. IR TRANSCATH PLACE STENT UPPER W PTA INIT ARTERY    Result Date: 1/21/2023  IR GUIDED PERC TRANSLUM NONCOR ART THROMBECTOMY INIT, IR INTRACRANIAL STENT(S), IR TRANSCATH PLACE STENT UPPER W PTA INIT ARTERY PROCEDURE PERFORMED: 1. Cerebral angiogram with mechanical thrombectomy of the left extracranial and intracranial portions of the vertebral artery 2. Stent assisted complete reconstruction of the Left  V1 and V2 portions of the extracranial vertebral artery WITH distal embolic protection 3. Recurrent reocclusion of the intracranial vertebral artery status post repeat thrombectomy 4. Artery to artery emboli into the left posterior cerebral artery and the right posterior cerebral artery status post aspiration thrombectomy of the right PCA and the left PCA 5.  Successful intracranial stenting ( MULTILINK VISION) of the V4 portion of the left vertebral artery to prevent recurrent occlusion 6. Intraoperative Ame CT Limited CT with interpretation COMPLETED DATE:  1/20/2023 5:00 PM CLINICAL HISTORY/PRE-PROCEDURE DIAGNOSIS: Acute ischemic stroke. Onset of stroke was on January 17. Patient has having recurrent posterior's circulation stroke despite maximal medical therapy. Patient was then transferred from outside hospital to here. Medical therapy maximized with heparin drip. Continues to have more cranial nerve deficits. Procedure was offered on a humanitarian basis as he is still having strokes on heparin drip POST-PROCEDURE DIAGNOSIS: Successful mechanical thrombectomy successful stent assisted reconstruction/recanalization of the left vertebral artery connecting it to the basilar artery/restoration of flow COMPARISON: CT angiographic documentation done at outside hospital images were transferred into PACS. Data imaging 1/19/2023 MRI 1/20/2023 ANESTHESIA: Conscious sedation and local anesthesia VESSELS CATHETERIZED: 1. Left vertebral artery. 2. Basilar artery. 3. Left posterior cerebral artery. 4 right posterior cerebral artery. 6. Left subclavian artery. RADIATION EXPOSURE DATA:  1177 MG Y TOTAL FLUOROSCOPY TIME: 29 minutes and 36 seconds CONTRAST USED: 125 mL of Isovue-300 PROCEDURE: Following informed consent, the patient was brought to the angiography suite, both groins are prepped and draped in usual sterile manner. Vascular access was obtained in the right common femoral artery with a 8-Qatari sheath which was connected to continuous heparinized saline flush. A 8-Qatari cerebase catheter was prepped and with the support of a diagnostic catheter was brought into the aorta, double flushed and connected to continuous heparinized saline flush. The  guide catheter was then telescoped into the left subclavian artery.  Fluoroscopic imaging performed at that time confirmed the presence of previously documented arterial occlusion. Multilevel partial thrombosis all throughout the left vertebral artery severe stenosis at the origin of the left vertebral artery/vertebral subclavian junction. Following this aspiration was initially performed through the cerebral base catheter through the Zoom suction device and then gradually a Zoom 71 was taken up over a glide advantage wire exchange length and continuous aspiration was performed wall pushing the aspiration catheter forward into the left vertebral artery. Large amount of thrombus was identified. Following this imaging was performed which showed intracranial V4 occlusion of the left vertebral artery and hence using Zoom 35 system through the Zoom 71 system serial aspiration was performed successful thrombectomy was performed until the midportion of the basilar artery. No distal embolization was noticed. On repeat imaging through the proximal vertebral artery there was really occlusion noticed in the V2 segment and hence it was decided to proceed with the stent assisted reconstruction of the entire vessel. A 5 mm spider device was then deployed into the V3 segment of the vertebral artery Patient was started on Integrilin. Initially 1 carotid Wallstent was placed in the distal portionv2 and there is was overlap with the second portion. Again in the V1 portion and initial carotid stent was placed and in the subclavian vertebral junction there is severe stenosis and hence of fourth carotid wall stent was also placed here. After a total of 4 stents were deployed in the V1 and V2 segments angiographic documentation was repeated which showed distal occlusion in the V4 segment and hence a repeat thrombectomy was performed in the V4 segment. This resulted in artery to artery embolization into bilateral posterior cerebral arteries and the basilar tip. Aspiration thrombectomy was performed initially in the left posterior cerebral artery and was successful.  Aspiration thrombectomy was then repeated in the right posterior cerebral artery multiple attempts. Following this it was decided to stent the V4 segment that keeps reoccluding and hence balloon mounted stent  3.5mm x 18 mm Multilink vision was taken over and deployed over a 014 wire that was taken into the left vertebral artery and internal taken down into the right vertebral artery for appropriate support to deployed without a intermediate catheter. The balloon mounted stent was successfully deployed at 8 claude. Angiographic documentation repeated with contrast reveals good flow throughout the left vertebral artery this was repeated again at 3 minute interval and was persistently open. Intraoperative Ame CT was obtained which was negative for any stephanie obvious intracranial hemorrhage. Patient will be maintained on Integrilin drip. Anesthesia was reversed and patient was transferred to the postoperative recovery unit in a stable condition. STROKE ACUTE TIME STAMPS: Please see nursing documentation epic INTERPRETATION OF IMAGES: 1. Left  vertebral artery injections: The common and internal carotid arteries had partial thrombosis of the V1 and V2 segment. Complete thrombosis of the V3 segment complete thrombosis of the V4 segment with no flow contrast leading to the basilar artery. 2. Angiographic documentation post carotid Wallstent 6 mm x 22 mm deployment: Stent 1. Was deployed in the distal V2 portion of the vertebral artery post deployment no evidence of in-stent stenosis or thrombosis. 3. Angiographic documentation post carotid Wallstent 6 mm x 22 mm deployment: Stent was deployed in the mid V2 portion angiogram reveals patency of the stent 4. Angiographic documentation post carotid Wallstent 6 mm x 22 mm deployment: Stent was deployed in the proximal V2 to V1 portion. Repeat angiogram reveals that the first and second stent have occluded.  5.Angiographic documentation post carotid Wallstent 6 mm x 22 mm deployment: Stent was deployed from the V4 segment to the subclavian artery. Persistent reocclusion. 6. Angiogram post thrombectomy of the V1 V2 V3 segment successful recanalization however the distal V4 portion seems to have reoccluded. 7. Angiogram post thrombectomy of the intracranial vertebral artery: There appears to be distal embolization into the PCA. 8. Angiogram post thrombectomy of bilateral posterior cerebral arteries: Post thrombectomy images reveal satisfactory recanalization of bilateral posterior cerebral arteries 9. Angiogram post intracranial stenting with Multi-Link stent: Multi-Link stent is placed in the V4 segment no evidence of any in-stent stenosis or thrombosis. 10.Postprocedure left vertebral artery artery injections: Postprocedure all the stents are patent. Restoration of flow to the posterior circulation. No evidence of restenosis artery thrombosis. TICI 3 Intraoperative Ame CT Limited CT with interpretation: Intraoperative Ame CT Limited CT with interpretation was performed using 3-D rotational technique. Axial coronal and sagittal images were reconstructed. There were reviewed and did not show any evidence of intracranial hemorrhage. IMPRESSION 1. 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 Partial from doses of the V1 and V2 segments suspicious for vessel trauma. 2. Successful thrombectomy followed by reocclusion of the left vertebral artery both extracranial and intracranial segments. 3. Complete stent assisted reconstruction of the extracranial left vertebral artery with distal embolic protection. 4. Left posterior cerebral artery and right posterior cerebral artery thrombectomy as a result from artery to artery emboli 5. Successful rescue mechanical intracranial stenting of the V4 segment to prevent reocclusion Patients: If you have questions regarding some of the verbiage in your report, please visit [email or web address] for a definition.   If you have any other questions, please contact your physician. IR INTRACRANIAL STENT(S)    Result Date: 1/21/2023  IR GUIDED PERC TRANSLUM NONCOR ART THROMBECTOMY INIT, IR INTRACRANIAL STENT(S), IR TRANSCATH PLACE STENT UPPER W PTA INIT ARTERY PROCEDURE PERFORMED: 1. Cerebral angiogram with mechanical thrombectomy of the left extracranial and intracranial portions of the vertebral artery 2. Stent assisted complete reconstruction of the Left  V1 and V2 portions of the extracranial vertebral artery WITH distal embolic protection 3. Recurrent reocclusion of the intracranial vertebral artery status post repeat thrombectomy 4. Artery to artery emboli into the left posterior cerebral artery and the right posterior cerebral artery status post aspiration thrombectomy of the right PCA and the left PCA 5. Successful intracranial stenting ( [employer] VISION) of the V4 portion of the left vertebral artery to prevent recurrent occlusion 6. Intraoperative Ame CT Limited CT with interpretation COMPLETED DATE:  1/20/2023 5:00 PM CLINICAL HISTORY/PRE-PROCEDURE DIAGNOSIS: Acute ischemic stroke. Onset of stroke was on January 17. Patient has having recurrent posterior's circulation stroke despite maximal medical therapy. Patient was then transferred from outside hospital to here. Medical therapy maximized with heparin drip. Continues to have more cranial nerve deficits. Procedure was offered on a humanitarian basis as he is still having strokes on heparin drip POST-PROCEDURE DIAGNOSIS: Successful mechanical thrombectomy successful stent assisted reconstruction/recanalization of the left vertebral artery connecting it to the basilar artery/restoration of flow COMPARISON: CT angiographic documentation done at outside hospital images were transferred into PACS. Data imaging 1/19/2023 MRI 1/20/2023 ANESTHESIA: Conscious sedation and local anesthesia VESSELS CATHETERIZED: 1. Left vertebral artery. 2. Basilar artery. 3. Left posterior cerebral artery. 4 right posterior cerebral artery.  6. Left subclavian artery. RADIATION EXPOSURE DATA:  1177 MG Y TOTAL FLUOROSCOPY TIME: 29 minutes and 36 seconds CONTRAST USED: 125 mL of Isovue-300 PROCEDURE: Following informed consent, the patient was brought to the angiography suite, both groins are prepped and draped in usual sterile manner. Vascular access was obtained in the right common femoral artery with a 8-Argentine sheath which was connected to continuous heparinized saline flush. A 8-Argentine cerebase catheter was prepped and with the support of a diagnostic catheter was brought into the aorta, double flushed and connected to continuous heparinized saline flush. The  guide catheter was then telescoped into the left subclavian artery. Fluoroscopic imaging performed at that time confirmed the presence of previously documented arterial occlusion. Multilevel partial thrombosis all throughout the left vertebral artery severe stenosis at the origin of the left vertebral artery/vertebral subclavian junction. Following this aspiration was initially performed through the cerebral base catheter through the Zoom suction device and then gradually a Zoom 71 was taken up over a glide advantage wire exchange length and continuous aspiration was performed wall pushing the aspiration catheter forward into the left vertebral artery. Large amount of thrombus was identified. Following this imaging was performed which showed intracranial V4 occlusion of the left vertebral artery and hence using Zoom 35 system through the Zoom 71 system serial aspiration was performed successful thrombectomy was performed until the midportion of the basilar artery. No distal embolization was noticed. On repeat imaging through the proximal vertebral artery there was really occlusion noticed in the V2 segment and hence it was decided to proceed with the stent assisted reconstruction of the entire vessel.  A 5 mm spider device was then deployed into the V3 segment of the vertebral artery Patient was started on Integrilin. Initially 1 carotid Wallstent was placed in the distal portionv2 and there is was overlap with the second portion. Again in the V1 portion and initial carotid stent was placed and in the subclavian vertebral junction there is severe stenosis and hence of fourth carotid wall stent was also placed here. After a total of 4 stents were deployed in the V1 and V2 segments angiographic documentation was repeated which showed distal occlusion in the V4 segment and hence a repeat thrombectomy was performed in the V4 segment. This resulted in artery to artery embolization into bilateral posterior cerebral arteries and the basilar tip. Aspiration thrombectomy was performed initially in the left posterior cerebral artery and was successful. Aspiration thrombectomy was then repeated in the right posterior cerebral artery multiple attempts. Following this it was decided to stent the V4 segment that keeps reoccluding and hence balloon mounted stent  3.5mm x 18 mm Multilink vision was taken over and deployed over a 014 wire that was taken into the left vertebral artery and internal taken down into the right vertebral artery for appropriate support to deployed without a intermediate catheter. The balloon mounted stent was successfully deployed at 8 claude. Angiographic documentation repeated with contrast reveals good flow throughout the left vertebral artery this was repeated again at 3 minute interval and was persistently open. Intraoperative Ame CT was obtained which was negative for any stephanie obvious intracranial hemorrhage. Patient will be maintained on Integrilin drip. Anesthesia was reversed and patient was transferred to the postoperative recovery unit in a stable condition. STROKE ACUTE TIME STAMPS: Please see nursing documentation epic INTERPRETATION OF IMAGES: 1. Left  vertebral artery injections: The common and internal carotid arteries had partial thrombosis of the V1 and V2 segment.  Complete thrombosis of the V3 segment complete thrombosis of the V4 segment with no flow contrast leading to the basilar artery. 2. Angiographic documentation post carotid Wallstent 6 mm x 22 mm deployment: Stent 1. Was deployed in the distal V2 portion of the vertebral artery post deployment no evidence of in-stent stenosis or thrombosis. 3. Angiographic documentation post carotid Wallstent 6 mm x 22 mm deployment: Stent was deployed in the mid V2 portion angiogram reveals patency of the stent 4. Angiographic documentation post carotid Wallstent 6 mm x 22 mm deployment: Stent was deployed in the proximal V2 to V1 portion. Repeat angiogram reveals that the first and second stent have occluded. 5.Angiographic documentation post carotid Wallstent 6 mm x 22 mm deployment: Stent was deployed from the V4 segment to the subclavian artery. Persistent reocclusion. 6. Angiogram post thrombectomy of the V1 V2 V3 segment successful recanalization however the distal V4 portion seems to have reoccluded. 7. Angiogram post thrombectomy of the intracranial vertebral artery: There appears to be distal embolization into the PCA. 8. Angiogram post thrombectomy of bilateral posterior cerebral arteries: Post thrombectomy images reveal satisfactory recanalization of bilateral posterior cerebral arteries 9. Angiogram post intracranial stenting with Multi-Link stent: Multi-Link stent is placed in the V4 segment no evidence of any in-stent stenosis or thrombosis. 10.Postprocedure left vertebral artery artery injections: Postprocedure all the stents are patent. Restoration of flow to the posterior circulation. No evidence of restenosis artery thrombosis. TICI 3 Intraoperative Ame CT Limited CT with interpretation: Intraoperative Ame CT Limited CT with interpretation was performed using 3-D rotational technique. Axial coronal and sagittal images were reconstructed. There were reviewed and did not show any evidence of intracranial hemorrhage. IMPRESSION 1. 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 Partial from doses of the V1 and V2 segments suspicious for vessel trauma. 2. Successful thrombectomy followed by reocclusion of the left vertebral artery both extracranial and intracranial segments. 3. Complete stent assisted reconstruction of the extracranial left vertebral artery with distal embolic protection. 4. Left posterior cerebral artery and right posterior cerebral artery thrombectomy as a result from artery to artery emboli 5. Successful rescue mechanical intracranial stenting of the V4 segment to prevent reocclusion Patients: If you have questions regarding some of the verbiage in your report, please visit [email or web address] for a definition. If you have any other questions, please contact your physician. MRI BRAIN WO CONTRAST    Result Date: 1/20/2023  EXAMINATION: MRI OF THE BRAIN WITHOUT CONTRAST  1/20/2023 11:26 am TECHNIQUE: Multiplanar multisequence MRI of the brain was performed without the administration of intravenous contrast. COMPARISON: Head CT dated 01/20/2023 and outside MRI brain dated 01/19/2023 HISTORY: ORDERING SYSTEM PROVIDED HISTORY: stroke protocol TECHNOLOGIST PROVIDED HISTORY: Reason for exam:->stroke protocol What is the sedation requirement?->None What reading provider will be dictating this exam?->CRC FINDINGS: INTRACRANIAL STRUCTURES/VENTRICLES: There is a large area of restricted diffusion in the inferior left cerebellum, as well as the left dorsal medulla, consistent with acute/subacute infarct, primarily in the left posteroinferior cerebellar artery territory. Small area of restricted diffusion again seen in the right cerebellum on series 2, image 11, consistent with acute/subacute infarct. No new area of restricted diffusion is seen. There is associated cytotoxic edema in the left inferior cerebellum with slight effacement of the overlying cortical sulci.   No other area of mass effect or midline shift. Tiny focus of low signal is seen in the right ezekiel on gradient echo series 5, image 11. This was present on the prior study from 01/19/2023. It is of indeterminate age but could represent old petechial hemorrhage. Otherwise, no evidence of an acute intracranial hemorrhage. The ventricles and sulci are normal in size and configuration. The sellar/suprasellar regions appear unremarkable. The normal signal voids within the major intracranial vessels appear maintained. ORBITS: The visualized portion of the orbits demonstrate no acute abnormality. SINUSES: The visualized paranasal sinuses and mastoid air cells demonstrate no acute abnormality. There is again severe mucosal thickening in the right sphenoid sinus which is almost completely opacified and moderate mucosal thickening in the left ethmoid sinus as well as mild thickening in other sinuses. BONES/SOFT TISSUES: The bone marrow signal intensity appears normal. The soft tissues demonstrate no acute abnormality. 1. Bilateral acute/subacute infarcts including left PICA infarct and small lacunar infarct in the right cerebellum, similar compared to the prior study. 2. Probable area of old petechial hemorrhage in the right lateral ezekiel. 3. Otherwise, no new acute intracranial abnormality seen. 4. Chronic sinus disease. IR GUIDED PERC TRANSLUM ART THROMBECTOMY INIT    Result Date: 1/21/2023  IR GUIDED PERC TRANSLUM NONCOR ART THROMBECTOMY INIT, IR INTRACRANIAL STENT(S), IR TRANSCATH PLACE STENT UPPER W PTA INIT ARTERY PROCEDURE PERFORMED: 1. Cerebral angiogram with mechanical thrombectomy of the left extracranial and intracranial portions of the vertebral artery 2. Stent assisted complete reconstruction of the Left  V1 and V2 portions of the extracranial vertebral artery WITH distal embolic protection 3. Recurrent reocclusion of the intracranial vertebral artery status post repeat thrombectomy 4. Artery to artery emboli into the left posterior cerebral artery and the right posterior cerebral artery status post aspiration thrombectomy of the right PCA and the left PCA 5. Successful intracranial stenting ( MULTILINK VISION) of the V4 portion of the left vertebral artery to prevent recurrent occlusion 6. Intraoperative Ame CT Limited CT with interpretation COMPLETED DATE:  1/20/2023 5:00 PM CLINICAL HISTORY/PRE-PROCEDURE DIAGNOSIS: Acute ischemic stroke. Onset of stroke was on January 17. Patient has having recurrent posterior's circulation stroke despite maximal medical therapy. Patient was then transferred from outside hospital to here. Medical therapy maximized with heparin drip. Continues to have more cranial nerve deficits. Procedure was offered on a humanitarian basis as he is still having strokes on heparin drip POST-PROCEDURE DIAGNOSIS: Successful mechanical thrombectomy successful stent assisted reconstruction/recanalization of the left vertebral artery connecting it to the basilar artery/restoration of flow COMPARISON: CT angiographic documentation done at outside hospital images were transferred into PACS. Data imaging 1/19/2023 MRI 1/20/2023 ANESTHESIA: Conscious sedation and local anesthesia VESSELS CATHETERIZED: 1. Left vertebral artery. 2. Basilar artery. 3. Left posterior cerebral artery. 4 right posterior cerebral artery. 6. Left subclavian artery. RADIATION EXPOSURE DATA:  1177 MG Y TOTAL FLUOROSCOPY TIME: 29 minutes and 36 seconds CONTRAST USED: 125 mL of Isovue-300 PROCEDURE: Following informed consent, the patient was brought to the angiography suite, both groins are prepped and draped in usual sterile manner. Vascular access was obtained in the right common femoral artery with a 8-Togolese sheath which was connected to continuous heparinized saline flush.   A 8-Togolese cerebase catheter was prepped and with the support of a diagnostic catheter was brought into the aorta, double flushed and connected to continuous heparinized saline flush. The  guide catheter was then telescoped into the left subclavian artery. Fluoroscopic imaging performed at that time confirmed the presence of previously documented arterial occlusion. Multilevel partial thrombosis all throughout the left vertebral artery severe stenosis at the origin of the left vertebral artery/vertebral subclavian junction. Following this aspiration was initially performed through the cerebral base catheter through the Zoom suction device and then gradually a Zoom 71 was taken up over a glide advantage wire exchange length and continuous aspiration was performed wall pushing the aspiration catheter forward into the left vertebral artery. Large amount of thrombus was identified. Following this imaging was performed which showed intracranial V4 occlusion of the left vertebral artery and hence using Zoom 35 system through the Zoom 71 system serial aspiration was performed successful thrombectomy was performed until the midportion of the basilar artery. No distal embolization was noticed. On repeat imaging through the proximal vertebral artery there was really occlusion noticed in the V2 segment and hence it was decided to proceed with the stent assisted reconstruction of the entire vessel. A 5 mm spider device was then deployed into the V3 segment of the vertebral artery Patient was started on Integrilin. Initially 1 carotid Wallstent was placed in the distal portionv2 and there is was overlap with the second portion. Again in the V1 portion and initial carotid stent was placed and in the subclavian vertebral junction there is severe stenosis and hence of fourth carotid wall stent was also placed here. After a total of 4 stents were deployed in the V1 and V2 segments angiographic documentation was repeated which showed distal occlusion in the V4 segment and hence a repeat thrombectomy was performed in the V4 segment.  This resulted in artery to artery embolization into bilateral posterior cerebral arteries and the basilar tip. Aspiration thrombectomy was performed initially in the left posterior cerebral artery and was successful. Aspiration thrombectomy was then repeated in the right posterior cerebral artery multiple attempts. Following this it was decided to stent the V4 segment that keeps reoccluding and hence balloon mounted stent  3.5mm x 18 mm Multilink vision was taken over and deployed over a 014 wire that was taken into the left vertebral artery and internal taken down into the right vertebral artery for appropriate support to deployed without a intermediate catheter. The balloon mounted stent was successfully deployed at 8 claude. Angiographic documentation repeated with contrast reveals good flow throughout the left vertebral artery this was repeated again at 3 minute interval and was persistently open. Intraoperative Ame CT was obtained which was negative for any stephanie obvious intracranial hemorrhage. Patient will be maintained on Integrilin drip. Anesthesia was reversed and patient was transferred to the postoperative recovery unit in a stable condition. STROKE ACUTE TIME STAMPS: Please see nursing documentation epic INTERPRETATION OF IMAGES: 1. Left  vertebral artery injections: The common and internal carotid arteries had partial thrombosis of the V1 and V2 segment. Complete thrombosis of the V3 segment complete thrombosis of the V4 segment with no flow contrast leading to the basilar artery. 2. Angiographic documentation post carotid Wallstent 6 mm x 22 mm deployment: Stent 1. Was deployed in the distal V2 portion of the vertebral artery post deployment no evidence of in-stent stenosis or thrombosis. 3. Angiographic documentation post carotid Wallstent 6 mm x 22 mm deployment: Stent was deployed in the mid V2 portion angiogram reveals patency of the stent 4. Angiographic documentation post carotid Wallstent 6 mm x 22 mm deployment: Stent was deployed in the proximal V2 to V1 portion. Repeat angiogram reveals that the first and second stent have occluded. 5.Angiographic documentation post carotid Wallstent 6 mm x 22 mm deployment: Stent was deployed from the V4 segment to the subclavian artery. Persistent reocclusion. 6. Angiogram post thrombectomy of the V1 V2 V3 segment successful recanalization however the distal V4 portion seems to have reoccluded. 7. Angiogram post thrombectomy of the intracranial vertebral artery: There appears to be distal embolization into the PCA. 8. Angiogram post thrombectomy of bilateral posterior cerebral arteries: Post thrombectomy images reveal satisfactory recanalization of bilateral posterior cerebral arteries 9. Angiogram post intracranial stenting with Multi-Link stent: Multi-Link stent is placed in the V4 segment no evidence of any in-stent stenosis or thrombosis. 10.Postprocedure left vertebral artery artery injections: Postprocedure all the stents are patent. Restoration of flow to the posterior circulation. No evidence of restenosis artery thrombosis. TICI 3 Intraoperative Ame CT Limited CT with interpretation: Intraoperative Ame CT Limited CT with interpretation was performed using 3-D rotational technique. Axial coronal and sagittal images were reconstructed. There were reviewed and did not show any evidence of intracranial hemorrhage. IMPRESSION 1. 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 Partial from doses of the V1 and V2 segments suspicious for vessel trauma. 2. Successful thrombectomy followed by reocclusion of the left vertebral artery both extracranial and intracranial segments. 3. Complete stent assisted reconstruction of the extracranial left vertebral artery with distal embolic protection. 4. Left posterior cerebral artery and right posterior cerebral artery thrombectomy as a result from artery to artery emboli 5.  Successful rescue mechanical intracranial stenting of the V4 segment to prevent reocclusion Patients: If you have questions regarding some of the verbiage in your report, please visit [email or web address] for a definition. If you have any other questions, please contact your physician. US DUP LOWER EXTREMITIES BILATERAL VENOUS    Result Date: 1/21/2023  EXAMINATION: DUPLEX VENOUS ULTRASOUND OF THE BILATERAL LOWER EXTREMITIES1/21/2023 1:54 pm TECHNIQUE: Duplex ultrasound using B-mode/gray scaled imaging, Doppler spectral analysis and color flow Doppler was obtained of the deep venous structures of the lower bilateral extremities. COMPARISON: None. HISTORY: ORDERING SYSTEM PROVIDED HISTORY: r/o DVT TECHNOLOGIST PROVIDED HISTORY: Reason for exam:->r/o DVT What reading provider will be dictating this exam?->CRC FINDINGS: The visualized veins of the bilateral lower extremities are patent and free of echogenic thrombus. The common femoral veins and iliac veins are limited evaluation due to the presence of bandages. The veins of the lower extremity however demonstrate good compressibility with normal color flow study and spectral analysis. No evidence of DVT in either lower extremity. All Others since admission  CT HEAD WO CONTRAST    Result Date: 1/21/2023  1. Evolving infarct involving the right posterior cerebral artery distribution. 2. Stable infarct involving the left posteroinferior cerebellar artery distribution. .     CT HEAD WO CONTRAST    Result Date: 1/20/2023  Subacute infarction localized in the inferior left cerebellar hemisphere in the PICA distribution. No hemorrhage. Chronic parenchymal change including atrophy and old white matter ischemia. MRI BRAIN WO CONTRAST    Result Date: 1/20/2023  1. Bilateral acute/subacute infarcts including left PICA infarct and small lacunar infarct in the right cerebellum, similar compared to the prior study. 2. Probable area of old petechial hemorrhage in the right lateral ezekiel. 3. Otherwise, no new acute intracranial abnormality seen.  4. Chronic sinus disease. US DUP LOWER EXTREMITIES BILATERAL VENOUS    Result Date: 1/21/2023  No evidence of DVT in either lower extremity. EKG  :     Rhythm Strip :Normal Sinus Rhythm    ECHO  :                        Assessment and Plan of care     Diagnosis:  Acute CVA left cerebellar stroke  S/P Left vertebral artery thrombectomy, left vertebral artery complete reconstruction with 5 stents. Left vertebral artery dissection  Vertical diplopia  Etoh abuse  COPD  Bradycardia resolved  Tobacco abuse  Delirium Tremens now improved    Plan:    Neuro: Patient is following commands, no focal neurodeficit. Off Precedex drip. Patient is not showing any features of alcohol withdrawal at this moment. Continue CIWA protocol. Folic acid and thiamine to continue. PT and OT to continue. Patient continues to have vertigo and left-sided weakness. Pulmonary: Continue Brovana, DuoNeb, budesonide  Cardiovascular: Continue aspirin and Brilinta. Continue atorvastatin. Abdomen/GI:No acute issues , continue diet,speech evaluation. Renal: Decent urine output, continue to monitor renal function  MSK: No active issues   Skin: No active issues   Hematology: No active issues  Endocrine: Monitor BS  GI: Prophylaxis: Continue Protonix  Code Status: Full Code  Disposition: Transfer to the stroke unit  Total time spent  25 mins. This did not include any procedures.     Electronically signed by Miguelina Silveira MD on 1/24/2023 at [address] [place] [address]

## 2023-01-25 ENCOUNTER — APPOINTMENT (OUTPATIENT)
Dept: GENERAL RADIOLOGY | Age: 68
DRG: 270 | End: 2023-01-25
Attending: INTERNAL MEDICINE
Payer: MEDICARE

## 2023-01-25 PROBLEM — J38.02 VOCAL CORD PARALYSIS, BILATERAL PARTIAL: Status: ACTIVE | Noted: 2023-01-25

## 2023-01-25 LAB
ALBUMIN SERPL-MCNC: 3.8 G/DL (ref 3.5–5.2)
ALP BLD-CCNC: 109 U/L (ref 40–129)
ALT SERPL-CCNC: 20 U/L (ref 0–40)
ANION GAP SERPL CALCULATED.3IONS-SCNC: 12 MMOL/L (ref 7–16)
APTT: 28.7 SEC (ref 24.5–35.1)
AST SERPL-CCNC: 25 U/L (ref 0–39)
BASOPHILS ABSOLUTE: 0.02 E9/L (ref 0–0.2)
BASOPHILS RELATIVE PERCENT: 0.4 % (ref 0–2)
BILIRUB SERPL-MCNC: 0.5 MG/DL (ref 0–1.2)
BUN BLDV-MCNC: 18 MG/DL (ref 6–23)
CALCIUM IONIZED: 1.3 MMOL/L (ref 1.15–1.33)
CALCIUM SERPL-MCNC: 9 MG/DL (ref 8.6–10.2)
CHLORIDE BLD-SCNC: 100 MMOL/L (ref 98–107)
CO2: 23 MMOL/L (ref 22–29)
CREAT SERPL-MCNC: 0.7 MG/DL (ref 0.7–1.2)
EOSINOPHILS ABSOLUTE: 0.12 E9/L (ref 0.05–0.5)
EOSINOPHILS RELATIVE PERCENT: 2.3 % (ref 0–6)
GFR SERPL CREATININE-BSD FRML MDRD: >60 ML/MIN/1.73
GLUCOSE BLD-MCNC: 105 MG/DL (ref 74–99)
HCT VFR BLD CALC: 30.4 % (ref 37–54)
HEMOGLOBIN: 10.7 G/DL (ref 12.5–16.5)
IMMATURE GRANULOCYTES #: 0.01 E9/L
IMMATURE GRANULOCYTES %: 0.2 % (ref 0–5)
INR BLD: 1.1
LYMPHOCYTES ABSOLUTE: 1.58 E9/L (ref 1.5–4)
LYMPHOCYTES RELATIVE PERCENT: 30 % (ref 20–42)
MAGNESIUM: 1.9 MG/DL (ref 1.6–2.6)
MCH RBC QN AUTO: 30.6 PG (ref 26–35)
MCHC RBC AUTO-ENTMCNC: 35.2 % (ref 32–34.5)
MCV RBC AUTO: 86.9 FL (ref 80–99.9)
MONOCYTES ABSOLUTE: 0.56 E9/L (ref 0.1–0.95)
MONOCYTES RELATIVE PERCENT: 10.6 % (ref 2–12)
NEUTROPHILS ABSOLUTE: 2.98 E9/L (ref 1.8–7.3)
NEUTROPHILS RELATIVE PERCENT: 56.5 % (ref 43–80)
PDW BLD-RTO: 13.2 FL (ref 11.5–15)
PHOSPHORUS: 3.7 MG/DL (ref 2.5–4.5)
PLATELET # BLD: 340 E9/L (ref 130–450)
PMV BLD AUTO: 8.9 FL (ref 7–12)
POTASSIUM SERPL-SCNC: 4 MMOL/L (ref 3.5–5)
PROTHROMBIN TIME: 12.4 SEC (ref 9.3–12.4)
RBC # BLD: 3.5 E12/L (ref 3.8–5.8)
SODIUM BLD-SCNC: 135 MMOL/L (ref 132–146)
TOTAL PROTEIN: 6.7 G/DL (ref 6.4–8.3)
WBC # BLD: 5.3 E9/L (ref 4.5–11.5)

## 2023-01-25 PROCEDURE — 85025 COMPLETE CBC W/AUTO DIFF WBC: CPT

## 2023-01-25 PROCEDURE — 31575 DIAGNOSTIC LARYNGOSCOPY: CPT | Performed by: OTOLARYNGOLOGY

## 2023-01-25 PROCEDURE — 92526 ORAL FUNCTION THERAPY: CPT | Performed by: SPEECH-LANGUAGE PATHOLOGIST

## 2023-01-25 PROCEDURE — 94640 AIRWAY INHALATION TREATMENT: CPT

## 2023-01-25 PROCEDURE — 1200000000 HC SEMI PRIVATE

## 2023-01-25 PROCEDURE — 74230 X-RAY XM SWLNG FUNCJ C+: CPT

## 2023-01-25 PROCEDURE — 82330 ASSAY OF CALCIUM: CPT

## 2023-01-25 PROCEDURE — 97530 THERAPEUTIC ACTIVITIES: CPT

## 2023-01-25 PROCEDURE — 92611 MOTION FLUOROSCOPY/SWALLOW: CPT | Performed by: SPEECH-LANGUAGE PATHOLOGIST

## 2023-01-25 PROCEDURE — 36415 COLL VENOUS BLD VENIPUNCTURE: CPT

## 2023-01-25 PROCEDURE — 6370000000 HC RX 637 (ALT 250 FOR IP)

## 2023-01-25 PROCEDURE — 99222 1ST HOSP IP/OBS MODERATE 55: CPT | Performed by: OTOLARYNGOLOGY

## 2023-01-25 PROCEDURE — 85610 PROTHROMBIN TIME: CPT

## 2023-01-25 PROCEDURE — 6370000000 HC RX 637 (ALT 250 FOR IP): Performed by: PSYCHIATRY & NEUROLOGY

## 2023-01-25 PROCEDURE — 80053 COMPREHEN METABOLIC PANEL: CPT

## 2023-01-25 PROCEDURE — 97535 SELF CARE MNGMENT TRAINING: CPT

## 2023-01-25 PROCEDURE — 84100 ASSAY OF PHOSPHORUS: CPT

## 2023-01-25 PROCEDURE — 6370000000 HC RX 637 (ALT 250 FOR IP): Performed by: INTERNAL MEDICINE

## 2023-01-25 PROCEDURE — 85730 THROMBOPLASTIN TIME PARTIAL: CPT

## 2023-01-25 PROCEDURE — 83735 ASSAY OF MAGNESIUM: CPT

## 2023-01-25 PROCEDURE — 2580000003 HC RX 258: Performed by: PSYCHIATRY & NEUROLOGY

## 2023-01-25 RX ADMIN — ARFORMOTEROL TARTRATE 15 MCG: 15 SOLUTION RESPIRATORY (INHALATION) at 08:04

## 2023-01-25 RX ADMIN — TICAGRELOR 90 MG: 90 TABLET ORAL at 08:45

## 2023-01-25 RX ADMIN — ATORVASTATIN CALCIUM 80 MG: 80 TABLET, FILM COATED ORAL at 00:28

## 2023-01-25 RX ADMIN — ASPIRIN 81 MG CHEWABLE TABLET 81 MG: 81 TABLET CHEWABLE at 08:45

## 2023-01-25 RX ADMIN — ATORVASTATIN CALCIUM 80 MG: 80 TABLET, FILM COATED ORAL at 21:41

## 2023-01-25 RX ADMIN — TICAGRELOR 90 MG: 90 TABLET ORAL at 21:41

## 2023-01-25 RX ADMIN — SODIUM CHLORIDE, PRESERVATIVE FREE 10 ML: 5 INJECTION INTRAVENOUS at 08:46

## 2023-01-25 RX ADMIN — Medication 1 TABLET: at 08:45

## 2023-01-25 RX ADMIN — SODIUM CHLORIDE, PRESERVATIVE FREE 10 ML: 5 INJECTION INTRAVENOUS at 21:41

## 2023-01-25 RX ADMIN — FOLIC ACID 1 MG: 1 TABLET ORAL at 08:45

## 2023-01-25 RX ADMIN — ACETAMINOPHEN 650 MG: 325 TABLET ORAL at 08:45

## 2023-01-25 RX ADMIN — POLYETHYLENE GLYCOL 3350 17 G: 17 POWDER, FOR SOLUTION ORAL at 21:41

## 2023-01-25 RX ADMIN — SODIUM CHLORIDE, PRESERVATIVE FREE 10 ML: 5 INJECTION INTRAVENOUS at 00:28

## 2023-01-25 RX ADMIN — Medication 100 MG: at 08:45

## 2023-01-25 RX ADMIN — BUDESONIDE 500 MCG: 0.5 SUSPENSION RESPIRATORY (INHALATION) at 20:56

## 2023-01-25 RX ADMIN — TICAGRELOR 90 MG: 90 TABLET ORAL at 00:28

## 2023-01-25 RX ADMIN — PANTOPRAZOLE SODIUM 40 MG: 40 TABLET, DELAYED RELEASE ORAL at 05:55

## 2023-01-25 RX ADMIN — ARFORMOTEROL TARTRATE 15 MCG: 15 SOLUTION RESPIRATORY (INHALATION) at 20:57

## 2023-01-25 RX ADMIN — BUDESONIDE 500 MCG: 0.5 SUSPENSION RESPIRATORY (INHALATION) at 08:04

## 2023-01-25 ASSESSMENT — ENCOUNTER SYMPTOMS
SINUS PRESSURE: 0
WHEEZING: 0
CHOKING: 0
RHINORRHEA: 0
COLOR CHANGE: 0
TROUBLE SWALLOWING: 1
GASTROINTESTINAL NEGATIVE: 1
VOICE CHANGE: 1
COUGH: 0
SORE THROAT: 0
STRIDOR: 0
SINUS PAIN: 0

## 2023-01-25 ASSESSMENT — PAIN SCALES - GENERAL: PAINLEVEL_OUTOF10: 0

## 2023-01-25 NOTE — PLAN OF CARE
Problem: Discharge Planning  Goal: Discharge to home or other facility with appropriate resources  Outcome: Progressing     Problem: Chronic Conditions and Co-morbidities  Goal: Patient's chronic conditions and co-morbidity symptoms are monitored and maintained or improved  Outcome: Progressing     Problem: ABCDS Injury Assessment  Goal: Absence of physical injury  Outcome: Progressing     Problem: Safety - Adult  Goal: Free from fall injury  Outcome: Progressing     Problem: Pain  Goal: Verbalizes/displays adequate comfort level or baseline comfort level  Outcome: Adequate for Discharge     Problem: Skin/Tissue Integrity  Goal: Absence of new skin breakdown  Description: 1. Monitor for areas of redness and/or skin breakdown  2. Assess vascular access sites hourly  3. Every 4-6 hours minimum:  Change oxygen saturation probe site  4. Every 4-6 hours:  If on nasal continuous positive airway pressure, respiratory therapy assess nares and determine need for appliance change or resting period. Outcome: Progressing     Problem: Neurosensory - Adult  Goal: Achieves stable or improved neurological status  Outcome: Progressing  Goal: Achieves maximal functionality and self care  Outcome: Progressing     Problem: Musculoskeletal - Adult  Goal: Return mobility to safest level of function  Outcome: Progressing  Goal: Return ADL status to a safe level of function  Outcome: Progressing     Problem: Drug Abuse/Detox  Goal: Will have no detox symptoms and will verbalize plan for changing drug-related behavior  Description: INTERVENTIONS:  1. Administer medication as ordered  2. Monitor physical status  3. Provide emotional support with 1:1 interaction with staff  4. Encourage  recovery focused treatment   Outcome: Progressing     Problem: Self Care Deficit  Goal: Return ADL status to a safe level of function  Description: INTERVENTIONS:  1. Administer medication as ordered  2.  Assess ADL deficits and provide assistive devices as needed  3. Obtain PT/OT consults as needed  4.  Assist and instruct patient to increase activity and self care as tolerated  Outcome: Progressing

## 2023-01-25 NOTE — PROGRESS NOTES
SPEECH/LANGUAGE PATHOLOGY  VIDEOFLUOROSCOPIC STUDY OF SWALLOWING (MBS)   and PLAN OF CARE    PATIENT NAME:  Nancy Levine  (male)     MRN:  [id number]    :  1955  (78 y.o.)  STATUS:  Inpatient: Room 5414/5414-A    TODAY'S DATE:  23 1030    SLP video swallow  Start:  23 1030,   End:  23 1030,   ONE TIME,   Standing Count:  1 Occurrences,   R         Naif Chow MD   REASON FOR REFERRAL: further assess oropharyngeal swallow function   EVALUATING THERAPIST: CHAPARRITA Lora      RESULTS:      DYSPHAGIA DIAGNOSIS:  mild to moderate oropharyngeal phase dysphagia    Aspiration present on exam, recommend ENT consult to assess vocal fold functioning to further develop airway protection POC. DIET RECOMMENDATIONS:  Soft and bite size consistency solids (IDDSI level 6) with  nectar consistency (mildly thick - IDDSI level 2) liquids    MEDICATION ADMINISTRATION, and Per patient choice/nursing judgement    OK for ice chips (2-3 at a time) PRN s/p oral care to promote pharyngeal muscle use, decrease risk of further muscle disuse atrophy, and thin pharyngeal secretions. Monitor respiratory status and discontinue ice chips if concern arises. Recommend staff provide ice chips via teaspoon, do not leave cup at bedside. Once Pt begins rehab and increases ambulation, may consider the [employer] Protocol, however at this time Pt is at increased risk of aspiration pneumonia and further respiratory compromise secondary to known COPD and limited mobility. (See below)    [facility] Protocol --- Plain water in the absence of medication and other PO after ORAL CARE (brush oral cavity and rinse thoroughly). Research has shown that aspiration of water in presence of a clean oral cavity in Pt's who are able to ambulate poses little risk of aspiration pneumonia or the negative consequences of aspiration.  Use of pharyngeal muscles via this protocol will prevent further muscle disuse atrophy, while additionally supporting increased hydration needs. FEEDING RECOMMENDATIONS:    Assistance level:  Set-up is required for all oral intake     Compensatory strategies recommended: Double swallow, Small bites/sips, Alternate solids and liquids, and No straw     Discussed recommendations with nursing and/or faxed report to referring provider: Yes    Laryngeal Penetration and Aspiration:  Penetration WITH aspiration was observed in today's study with  thin liquid    SPEECH THERAPY  PLAN OF CARE   The dysphagia POC is established based on physician order and dysphagia diagnosis    Skilled SLP intervention for dysphagia management up to 5x per week until goals met, pt plateaus in function and/or discharged from hospital      Conditions Requiring Skilled Therapeutic Intervention for dysphagia:    Patient is performing below functional baseline d/t  current acute condition, Multiple diagnoses, multiple medications, and increased dependency upon caregivers.   Reduced pharyngeal clearing of the bolus  Reduced laryngeal closure resulting in aspiration   History of dysphagia/altered consistency      SPECIFIC DYSPHAGIA INTERVENTIONS TO INCLUDE:     Compensatory strategy training   Therapeutic exercises    Specific instructions for next treatment:  ongoing PO analysis to upgrade diet and evaluate tolerance of current PO recommendation, initiate instruction of therapeutic exercises , and initiate instruction of compensatory strategies  Treatment Goals:    Short Term Goals:  Pt will participate in ongoing mealtime assessment to provide diet modification and compensatory strategy implementation to minimize risk of aspiration associated with PO intake  Pt will complete laryngeal strength/ ROM therapeutic exercises to improve airway protection for the least restrictive PO diet minimal verbal prompts  Pt will complete Shaker and/or Chin Tuck Against Resistance (CTAR) to increase UES relaxation diameter and increase anterior laryngeal excursion to reduce pharyngeal residuals and reduce risk of pen/asp with minimal verbal prompts. Pt will complete Effortful Swallow therapeutically to target increased oral and base of tongue pressure, increased pharyngeal constrictor contractions, and increased UES relaxation duration to reduce pharyngeal residue with minimal verbal prompts     Long Term Goals:   Pt will improve oropharyngeal swallow function to ensure airway protection during PO intake to maintain adequate nutrition/hydration and decrease signs/symptoms of aspiration to less than 1 x/day. OTHER RECOMMENDATIONS:  An ENT consult is recommended     Patient/family Goal:    To be able to eat/drink better consistency foods/liquids    Plan of care discussed with Patient   The Patient understand(s) the diagnosis, prognosis and plan of care     Rehabilitation Potential/Prognosis: good                      ADMITTING DIAGNOSIS: Acute ischemic stroke ([facility] [address] [I63.9]     VISIT DIAGNOSIS:         PATIENT REPORT/COMPLAINT: patient currently on modified diet. PRIOR LEVEL OF SWALLOW FUNCTION:    Past History of Dysphagia?:  none reported    Home diet: Regular consistency solids (IDDSI level 7) with  thin liquids (IDDSI level 0)  Current Diet Order:  ADULT DIET; Dysphagia - Pureed;  Moderately Thick (Honey)    PROCEDURE:  Consistencies Administered During the Evaluation   Liquids: thin liquid and nectar thick liquid   Solids:  pureed foods and solid foods      Method of Intake:   cup, spoon  Self fed, Fed by clinician      Position:   Seated, upright    INSTRUMENTAL ASSESSMENT:    ORAL PREP/ ORAL PHASE:    Decreased mastication due to:  disorganized chewing pattern and residual weakness     PHARYNGEAL PHASE:     ONSET TIME       Onset time of the pharyngeal swallow was adequate       PHARYNGEAL RESIDUALS        Vallecula/Pharyngeal Wall           No significant residuals were noted in the vallecula      Pyriform Sinuses      Residuals in the pyriform sinuses were noted due to pharyngeal weakness and cricopharyngeal dysfunction for all consistencies administered  which  partially cleared  with cued double swallow and liquid chaser      LARYNGEAL PENETRATION   Laryngeal penetration occurred prior to aspiration. Further details under aspiration section. ASPIRATION  Aspiration occurred DURING the swallow for thin liquid due to  delayed laryngeal closure and inadequate laryngeal closure . Aspiration was mild and occurred inconsistently . an absent cough/throat clear was noted    PENETRATION-ASPIRATION SCALE (PAS):  THIN 8 = Material enters the airway, passes below the vocal folds, and no effort is made to eject   MILDLY THICK 1 = Material does not enter the airway  MODERATELY THICK item not administered  PUREE 1 = Material does not enter the airway  HARD SOLID 1 = Material does not enter the airway       COMPENSATORY STRATEGIES    Compensatory strategies that were beneficial included Double swallow, Small bites/sips, Alternate solids and liquids, and No straw      STRUCTURAL/FUNCTIONAL ANOMALIES   No structural/functional anomalies were noted    CERVICAL ESOPHAGEAL STAGE :     presence of CP Bar per Radiologist          ___________    Cognition:   Within functional limits for this exam    Oral Peripheral Examination   Improving strength    Current Respiratory Status   room air     Parameters of Speech Production  Respiration:  Adequate for speech production  Quality:   Breathy  Intensity: Quiet    Pain: No pain reported. EDUCATION:   The Speech Language Pathologist (SLP) completed education regarding results of evaluation and that intervention is warranted at this time.   Learner: Patient  Education: Reviewed results and recommendations of this evaluation, Reviewed diet and strategies, Reviewed signs, symptoms and risks of aspiration, Reviewed recommendations for follow-up, and Education Related to Potential Risks and Complications Due to Impairment/Illness/Injury  Evaluation of Education:  Verbalizes understanding    This plan may be re-evaluated and revised as warranted. Evaluation Time includes thorough review of current medical information, gathering information on past medical history/social history and prior level of function, completion of standardized testing/informal observation of tasks, assessment of data and education on plan of care and goals. [x]The admitting diagnosis and active problem list, have been reviewed prior to initiation of this evaluation. CPT Code: 33182  dysphagia study    ACTIVE PROBLEM LIST:   Patient Active Problem List   Diagnosis    Cerebellar stroke, acute ([facility] [address]    Vertebral artery dissection (HCC)    Chronic obstructive pulmonary disease (HCC)    Sinus bradycardia       INTERVENTION  CPT Code: 52304  dysphagia tx      Speech Pathologist (SLP) completed education with the patient/family regarding procedure of Modified Barium Swallow Study prior to exam and then type of swallowing impairment following completion of MBSS. Reviewed current solid/liquid consistency diet recommendations --   Dysphagia 3, Soft/advanced (Soft & Bite-sized) solids with  nectar consistency (mildly thick - IDDSI level 2) liquids and discussed compensatory strategies (Double swallow, Small bites/sips, Alternate solids and liquids, and No straw) to ensure safe PO intake. Images from MBSS reviewed with patient/ family and education provided. Reviewed aspiration precautions. Encouraged patient and/or family to engage SLP in unstructured Q&A session relative to identified deficit areas; indicated understanding of all information provided via satisfactory verbal response.     Melinda Lyon M.S., [address] Pathologist  [id number]  1/25/2023

## 2023-01-25 NOTE — CONSULTS
OTOLARYNGOLOGY  CONSULT NOTE  1/25/2023    Physician Consulted: Dr. Asa Prieto  Reason for Consult: Concern for vocal fold dysfunction s/p CVA      HPI  Brigida Tarango is a 78 y.o. male who ENT was consulted for evaluation of vocal cord dysfunction. Pt with onset of ataxia 1/17 with worsening symptoms and transferred from [facility] on 1/20. He then developed dysarthria and facial droop and subsequently found to have extensive infarction in bilateral cerebral hemispheres s/p L vertebral artery thrombosis with stent placement and bilateral PCA thrombectomy on 1/21 by Dr. Tiffanie Ruff. Pt with resolution of facial neurological dysfunction at this time with residual dysphagia and dysphonia. MBSS performed with mild to moderate oropharyngeal phase dysphagia. Pt states he ate soft and bite size consistency diet earlier today without issues. Pt denies any difficulty breathing or swallowing at this time. Denies dizziness or headache. Review of Systems   Constitutional:  Negative for activity change, fatigue and fever. HENT:  Positive for trouble swallowing and voice change. Negative for congestion, ear discharge, ear pain, hearing loss, nosebleeds, postnasal drip, rhinorrhea, sinus pressure, sinus pain, sore throat and tinnitus. Respiratory:  Negative for cough, choking, wheezing and stridor. Cardiovascular:  Negative for chest pain. Gastrointestinal: Negative. Genitourinary: Negative. Skin:  Negative for color change and rash. Neurological:  Negative for speech difficulty, light-headedness, numbness and headaches. Hematological:  Negative for adenopathy. Psychiatric/Behavioral:  Negative for behavioral problems. History reviewed. No pertinent past medical history.     Past Surgical History:   Procedure Laterality Date    HERNIA REPAIR      IR INTRACRANIAL STENT(S)  1/20/2023    IR INTRACRANIAL STENT(S) 1/20/2023 Abdiaziz Elkins MD [facility]    IR MECHANICAL ART THROMBECTOMY INIT 1/20/2023    IR MECHANICAL ART THROMBECTOMY INIT 1/20/2023 Guanaco Zaman MD [facility]    IR TRANSCATHETER INTRAVASCULAR UPPER PERIPH STENT INTRO  1/20/2023    IR TRANSCATHETER INTRAVASCULAR UPPER PERIPH STENT INTRO 1/20/2023 Guanaco Zaman MD [facility]       Medications Prior to Admission:    Prior to Admission medications    Not on File       No Known Allergies    History reviewed. No pertinent family history. Social History     Tobacco Use    Smoking status: Every Day     Packs/day: 0.75     Years: 40.00     Pack years: 30.00     Types: Cigarettes     Start date: 8/5/1979    Smokeless tobacco: Never           PHYSICAL EXAM:    Vitals:    01/25/23 0808   BP:    Pulse:    Resp:    Temp:    SpO2: 96%       General Appearance:  Sitting in bed, awake, alert, no apparent distress  Head/face:  NC/AT  Eyes: PERRL, EOMI  ENT: Bilateral external ears WNL, Nares patent, Septum deviated to right. Neck: Supple, no adenopathy  Lungs:  Non-labored, good respiratory effort, no stridor  Heart:  RR  Neuro: Facial nerve symmetric and intact. House Brackmann 1/6, bilaterally. Nasopharyngoscopy  Procedure note- after pt verbally consented, was sprayed nasally with 1:1 neosynephrine and xylocaine. Scope was passed and found nasal cavity with no lesion. nasopharynx clear, tonsil wnl without asymmetry, tongue mobile and no masses, vocal cords mobile bilaterally with full ab and ad duction. Hypopharynx clear, open and no masses. Bilateral true vocal folds immobile with left arytenoid hooding. 2-3mm glottic gap present. LABS:  CBC  Recent Labs     01/25/23  0425   WBC 5.3   HGB 10.7*   HCT 30.4*          RADIOLOGY  CT head without contrast    Result Date: 1/24/2023  EXAMINATION: CT OF THE HEAD WITHOUT CONTRAST  1/21/2023 12:48 am TECHNIQUE: CT of the head was performed without the administration of intravenous contrast.  Dual energy CT was also performed.   Automated exposure control, iterative reconstruction, and/or weight based adjustment of the mA/kV was utilized to reduce the radiation dose to as low as reasonably achievable. COMPARISON: The previous CT scan of the brain performed 01/20/2023 and prior MRI of the brain performed 01/20/2023. HISTORY: ORDERING SYSTEM PROVIDED HISTORY: follow up ischemic stroke, r/o hemorrhagic conversation TECHNOLOGIST PROVIDED HISTORY: Has a \"code stroke\" or \"stroke alert\" been called? ->No Reason for exam:->follow up ischemic stroke, r/o hemorrhagic conversation What reading provider will be dictating this exam?->CRC FINDINGS: BRAIN/VENTRICLES: Again identified is the large left cerebellar infarct, which is without change in size and configuration. Again seen is the lacunar infarct in the right cerebellum. There is no evidence of midline shift or gross mass. There is a new, ill-defined and vague area of decreased attenuation within the right occipital lobe, with effacement of sulci, indicating an involving, acute infarct. No hemorrhage or contusion is appreciated. Mild, diffuse, cerebral atrophy is again noted. The ventricular system is unremarkable. There has been interval placement of a left vertebral artery stent. Calcifications are again seen involving the cavernous carotid arteries. ORBITS: The visualized portion of the orbits demonstrate no acute abnormality. There is again deviation of the globes to the left. SINUSES: There has been no significant interval change in the ethmoid and right sphenoid sinus disease. The mastoid air cells remain clear. SOFT TISSUES/SKULL:  No acute abnormality of the visualized skull or soft tissues. 1.  Evolving, acute infarct involving the right occipital lobe. 2.  No significant interval change in the large left cerebellar infarct, when compared to the previous study performed 01/20/2023. 3.  Lacunar infarct again seen involving the right cerebellum.  4.  Mild, diffuse, cerebral atrophy again appreciated. 5. Interval placement of a left vertebral artery stent. 6.  Other findings, as described above. CT HEAD WO CONTRAST    Result Date: 1/21/2023  EXAMINATION: CT OF THE HEAD WITHOUT CONTRAST  1/21/2023 12:49 am TECHNIQUE: CT of the head was performed without the administration of intravenous contrast. Automated exposure control, iterative reconstruction, and/or weight based adjustment of the mA/kV was utilized to reduce the radiation dose to as low as reasonably achievable. COMPARISON: 01/21/2023 HISTORY: ORDERING SYSTEM PROVIDED HISTORY: post vertebral artery thrombectomy and stenting TECHNOLOGIST PROVIDED HISTORY: To be performed 24 hours after first CT Head. Reason for exam:->post vertebral artery thrombectomy and stenting Has a \"code stroke\" or \"stroke alert\" been called? ->No What reading provider will be dictating this exam?->CRC FINDINGS: BRAIN/VENTRICLES: There is a stable zone of encephalomalacia in the left cerebellum along the posteroinferior cerebellar artery distribution. There is slight effacement of the sulci suggest underlying cerebral edema but no midline shift is observed. There is an evolving area of encephalomalacia involving the right posterior cerebral artery distribution with there is increased low density along the posterior inferior aspect of the right occipital lobe. The gray-white differentiation is maintained without evidence of an acute infarct. There is no evidence of hydrocephalus. ORBITS: The visualized portion of the orbits demonstrate no acute abnormality. SINUSES: The visualized paranasal sinuses reveals opacification of the right sphenoid sinus is well as multiple ethmoid air cells suggests chronic sinusitis. The mastoid air cells demonstrate no acute abnormality. SOFT TISSUES/SKULL:  No acute abnormality of the visualized skull or soft tissues. 1. Evolving infarct involving the right posterior cerebral artery distribution.  2. Stable infarct involving the left posteroinferior cerebellar artery distribution. .     CT HEAD WO CONTRAST    Result Date: 1/20/2023  EXAMINATION: CT OF THE HEAD WITHOUT CONTRAST  1/20/2023 12:42 am TECHNIQUE: CT of the head was performed without the administration of intravenous contrast. Automated exposure control, iterative reconstruction, and/or weight based adjustment of the mA/kV was utilized to reduce the radiation dose to as low as reasonably achievable. COMPARISON: None. HISTORY: ORDERING SYSTEM PROVIDED HISTORY: follow up ischemic stroke, AMS TECHNOLOGIST PROVIDED HISTORY: Reason for exam:->follow up ischemic stroke, AMS Has a \"code stroke\" or \"stroke alert\" been called? ->Yes What reading provider will be dictating this exam?->CRC FINDINGS: BRAIN/VENTRICLES: There is abnormal amorphous low attenuation obscuring architecture of the left cerebellar hemisphere. The features have high association with acute infarction associated with the posterior inferior cerebellar artery. There is cerebral atrophy with associated ex vacuo dilatation of the ventricular system. There is mild ill-defined low-attenuation in the periventricular white matter, corona radiata, and centrum semiovale bilaterally which has association with age related microvascular white matter ischemic disease. There is mild atherosclerotic plaque in the bilateral carotid canal, carotid siphon, and cavernous internal carotid artery (<50%). There is no acute intracranial hemorrhage, mass effect, or midline shift. No abnormal extra-axial fluid collection. There is no evidence of hydrocephalus. ORBITS: The visualized portion of the orbits demonstrate no acute abnormality. SINUSES: There is mild polypoid perimucosal thickening in the right sphenoid sinus and posterior left-sided ethmoid air cells. The visualized mastoid air cells demonstrate no acute abnormality. SOFT TISSUES/SKULL:  No acute abnormality of the visualized skull or soft tissues.      Subacute infarction localized in the inferior left cerebellar hemisphere in the PICA distribution. No hemorrhage. Chronic parenchymal change including atrophy and old white matter ischemia. IR TRANSCATH PLACE STENT UPPER W PTA INIT ARTERY    Result Date: 1/21/2023  IR GUIDED PERC TRANSLUM NONCOR ART THROMBECTOMY INIT, IR INTRACRANIAL STENT(S), IR TRANSCATH PLACE STENT UPPER W PTA INIT ARTERY PROCEDURE PERFORMED: 1. Cerebral angiogram with mechanical thrombectomy of the left extracranial and intracranial portions of the vertebral artery 2. Stent assisted complete reconstruction of the Left  V1 and V2 portions of the extracranial vertebral artery WITH distal embolic protection 3. Recurrent reocclusion of the intracranial vertebral artery status post repeat thrombectomy 4. Artery to artery emboli into the left posterior cerebral artery and the right posterior cerebral artery status post aspiration thrombectomy of the right PCA and the left PCA 5. Successful intracranial stenting ( MULTILINK VISION) of the V4 portion of the left vertebral artery to prevent recurrent occlusion 6. Intraoperative Ame CT Limited CT with interpretation COMPLETED DATE:  1/20/2023 5:00 PM CLINICAL HISTORY/PRE-PROCEDURE DIAGNOSIS: Acute ischemic stroke. Onset of stroke was on January 17. Patient has having recurrent posterior's circulation stroke despite maximal medical therapy. Patient was then transferred from outside hospital to here. Medical therapy maximized with heparin drip. Continues to have more cranial nerve deficits. Procedure was offered on a humanitarian basis as he is still having strokes on heparin drip POST-PROCEDURE DIAGNOSIS: Successful mechanical thrombectomy successful stent assisted reconstruction/recanalization of the left vertebral artery connecting it to the basilar artery/restoration of flow COMPARISON: CT angiographic documentation done at outside hospital images were transferred into PACS.  Data imaging 1/19/2023 MRI 1/20/2023 ANESTHESIA: Conscious sedation and local anesthesia VESSELS CATHETERIZED: 1. Left vertebral artery. 2. Basilar artery. 3. Left posterior cerebral artery. 4 right posterior cerebral artery. 6. Left subclavian artery. RADIATION EXPOSURE DATA:  1177 MG Y TOTAL FLUOROSCOPY TIME: 29 minutes and 36 seconds CONTRAST USED: 125 mL of Isovue-300 PROCEDURE: Following informed consent, the patient was brought to the angiography suite, both groins are prepped and draped in usual sterile manner. Vascular access was obtained in the right common femoral artery with a 8-[place] sheath which was connected to continuous heparinized saline flush. A 8-[place] cerebase catheter was prepped and with the support of a diagnostic catheter was brought into the aorta, double flushed and connected to continuous heparinized saline flush. The  guide catheter was then telescoped into the left subclavian artery. Fluoroscopic imaging performed at that time confirmed the presence of previously documented arterial occlusion. Multilevel partial thrombosis all throughout the left vertebral artery severe stenosis at the origin of the left vertebral artery/vertebral subclavian junction. Following this aspiration was initially performed through the cerebral base catheter through the Zoom suction device and then gradually a Zoom 71 was taken up over a glide advantage wire exchange length and continuous aspiration was performed wall pushing the aspiration catheter forward into the left vertebral artery. Large amount of thrombus was identified. Following this imaging was performed which showed intracranial V4 occlusion of the left vertebral artery and hence using Zoom 35 system through the Zoom 71 system serial aspiration was performed successful thrombectomy was performed until the midportion of the basilar artery. No distal embolization was noticed.  On repeat imaging through the proximal vertebral artery there was really occlusion noticed in the V2 segment and hence it was decided to proceed with the stent assisted reconstruction of the entire vessel. A 5 mm spider device was then deployed into the V3 segment of the vertebral artery Patient was started on Integrilin. Initially 1 carotid Wallstent was placed in the distal portionv2 and there is was overlap with the second portion. Again in the V1 portion and initial carotid stent was placed and in the subclavian vertebral junction there is severe stenosis and hence of fourth carotid wall stent was also placed here. After a total of 4 stents were deployed in the V1 and V2 segments angiographic documentation was repeated which showed distal occlusion in the V4 segment and hence a repeat thrombectomy was performed in the V4 segment. This resulted in artery to artery embolization into bilateral posterior cerebral arteries and the basilar tip. Aspiration thrombectomy was performed initially in the left posterior cerebral artery and was successful. Aspiration thrombectomy was then repeated in the right posterior cerebral artery multiple attempts. Following this it was decided to stent the V4 segment that keeps reoccluding and hence balloon mounted stent  3.5mm x 18 mm Multilink vision was taken over and deployed over a 014 wire that was taken into the left vertebral artery and internal taken down into the right vertebral artery for appropriate support to deployed without a intermediate catheter. The balloon mounted stent was successfully deployed at 8 claude. Angiographic documentation repeated with contrast reveals good flow throughout the left vertebral artery this was repeated again at 3 minute interval and was persistently open. Intraoperative Ame CT was obtained which was negative for any stephanie obvious intracranial hemorrhage. Patient will be maintained on Integrilin drip. Anesthesia was reversed and patient was transferred to the postoperative recovery unit in a stable condition.  STROKE ACUTE TIME STAMPS: Please see nursing documentation epic INTERPRETATION OF IMAGES: 1. Left  vertebral artery injections: The common and internal carotid arteries had partial thrombosis of the V1 and V2 segment. Complete thrombosis of the V3 segment complete thrombosis of the V4 segment with no flow contrast leading to the basilar artery. 2. Angiographic documentation post carotid Wallstent 6 mm x 22 mm deployment: Stent 1. Was deployed in the distal V2 portion of the vertebral artery post deployment no evidence of in-stent stenosis or thrombosis. 3. Angiographic documentation post carotid Wallstent 6 mm x 22 mm deployment: Stent was deployed in the mid V2 portion angiogram reveals patency of the stent 4. Angiographic documentation post carotid Wallstent 6 mm x 22 mm deployment: Stent was deployed in the proximal V2 to V1 portion. Repeat angiogram reveals that the first and second stent have occluded. 5.Angiographic documentation post carotid Wallstent 6 mm x 22 mm deployment: Stent was deployed from the V4 segment to the subclavian artery. Persistent reocclusion. 6. Angiogram post thrombectomy of the V1 V2 V3 segment successful recanalization however the distal V4 portion seems to have reoccluded. 7. Angiogram post thrombectomy of the intracranial vertebral artery: There appears to be distal embolization into the PCA. 8. Angiogram post thrombectomy of bilateral posterior cerebral arteries: Post thrombectomy images reveal satisfactory recanalization of bilateral posterior cerebral arteries 9. Angiogram post intracranial stenting with Multi-Link stent: Multi-Link stent is placed in the V4 segment no evidence of any in-stent stenosis or thrombosis. 10.Postprocedure left vertebral artery artery injections: Postprocedure all the stents are patent. Restoration of flow to the posterior circulation. No evidence of restenosis artery thrombosis. TICI 3 Intraoperative Ame CT Limited CT with interpretation: Intraoperative Ame CT Limited CT with interpretation was performed using 3-D rotational technique. Axial coronal and sagittal images were reconstructed. There were reviewed and did not show any evidence of intracranial hemorrhage. IMPRESSION 1. 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 Partial from doses of the V1 and V2 segments suspicious for vessel trauma. 2. Successful thrombectomy followed by reocclusion of the left vertebral artery both extracranial and intracranial segments. 3. Complete stent assisted reconstruction of the extracranial left vertebral artery with distal embolic protection. 4. Left posterior cerebral artery and right posterior cerebral artery thrombectomy as a result from artery to artery emboli 5. Successful rescue mechanical intracranial stenting of the V4 segment to prevent reocclusion Patients: If you have questions regarding some of the verbiage in your report, please visit [email or web address] for a definition. If you have any other questions, please contact your physician. IR INTRACRANIAL STENT(S)    Result Date: 1/21/2023  IR GUIDED PERC TRANSLUM NONCOR ART THROMBECTOMY INIT, IR INTRACRANIAL STENT(S), IR TRANSCATH PLACE STENT UPPER W PTA INIT ARTERY PROCEDURE PERFORMED: 1. Cerebral angiogram with mechanical thrombectomy of the left extracranial and intracranial portions of the vertebral artery 2. Stent assisted complete reconstruction of the Left  V1 and V2 portions of the extracranial vertebral artery WITH distal embolic protection 3. Recurrent reocclusion of the intracranial vertebral artery status post repeat thrombectomy 4. Artery to artery emboli into the left posterior cerebral artery and the right posterior cerebral artery status post aspiration thrombectomy of the right PCA and the left PCA 5. Successful intracranial stenting ( MULTILINK VISION) of the V4 portion of the left vertebral artery to prevent recurrent occlusion 6.  Intraoperative Ame CT Limited CT with interpretation COMPLETED DATE:  1/20/2023 5:00 PM CLINICAL HISTORY/PRE-PROCEDURE DIAGNOSIS: Acute ischemic stroke. Onset of stroke was on January 17. Patient has having recurrent posterior's circulation stroke despite maximal medical therapy. Patient was then transferred from outside hospital to here. Medical therapy maximized with heparin drip. Continues to have more cranial nerve deficits. Procedure was offered on a humanitarian basis as he is still having strokes on heparin drip POST-PROCEDURE DIAGNOSIS: Successful mechanical thrombectomy successful stent assisted reconstruction/recanalization of the left vertebral artery connecting it to the basilar artery/restoration of flow COMPARISON: CT angiographic documentation done at outside hospital images were transferred into PACS. Data imaging 1/19/2023 MRI 1/20/2023 ANESTHESIA: Conscious sedation and local anesthesia VESSELS CATHETERIZED: 1. Left vertebral artery. 2. Basilar artery. 3. Left posterior cerebral artery. 4 right posterior cerebral artery. 6. Left subclavian artery. RADIATION EXPOSURE DATA:  1177 MG Y TOTAL FLUOROSCOPY TIME: 29 minutes and 36 seconds CONTRAST USED: 125 mL of Isovue-300 PROCEDURE: Following informed consent, the patient was brought to the angiography suite, both groins are prepped and draped in usual sterile manner. Vascular access was obtained in the right common femoral artery with a 8-[place] sheath which was connected to continuous heparinized saline flush. A 8-[place] cerebase catheter was prepped and with the support of a diagnostic catheter was brought into the aorta, double flushed and connected to continuous heparinized saline flush. The  guide catheter was then telescoped into the left subclavian artery. Fluoroscopic imaging performed at that time confirmed the presence of previously documented arterial occlusion.  Multilevel partial thrombosis all throughout the left vertebral artery severe stenosis at the origin of the left vertebral artery/vertebral subclavian junction. Following this aspiration was initially performed through the cerebral base catheter through the Zoom suction device and then gradually a Zoom 71 was taken up over a glide advantage wire exchange length and continuous aspiration was performed wall pushing the aspiration catheter forward into the left vertebral artery. Large amount of thrombus was identified. Following this imaging was performed which showed intracranial V4 occlusion of the left vertebral artery and hence using Zoom 35 system through the Zoom 71 system serial aspiration was performed successful thrombectomy was performed until the midportion of the basilar artery. No distal embolization was noticed. On repeat imaging through the proximal vertebral artery there was really occlusion noticed in the V2 segment and hence it was decided to proceed with the stent assisted reconstruction of the entire vessel. A 5 mm spider device was then deployed into the V3 segment of the vertebral artery Patient was started on Integrilin. Initially 1 carotid Wallstent was placed in the distal portionv2 and there is was overlap with the second portion. Again in the V1 portion and initial carotid stent was placed and in the subclavian vertebral junction there is severe stenosis and hence of fourth carotid wall stent was also placed here. After a total of 4 stents were deployed in the V1 and V2 segments angiographic documentation was repeated which showed distal occlusion in the V4 segment and hence a repeat thrombectomy was performed in the V4 segment. This resulted in artery to artery embolization into bilateral posterior cerebral arteries and the basilar tip. Aspiration thrombectomy was performed initially in the left posterior cerebral artery and was successful. Aspiration thrombectomy was then repeated in the right posterior cerebral artery multiple attempts.  Following this it was decided to stent the V4 segment that keeps reoccluding and hence balloon mounted stent  3.5mm x 18 mm Multilink vision was taken over and deployed over a 014 wire that was taken into the left vertebral artery and internal taken down into the right vertebral artery for appropriate support to deployed without a intermediate catheter. The balloon mounted stent was successfully deployed at 8 claude. Angiographic documentation repeated with contrast reveals good flow throughout the left vertebral artery this was repeated again at 3 minute interval and was persistently open. Intraoperative Ame CT was obtained which was negative for any stephanie obvious intracranial hemorrhage. Patient will be maintained on Integrilin drip. Anesthesia was reversed and patient was transferred to the postoperative recovery unit in a stable condition. STROKE ACUTE TIME STAMPS: Please see nursing documentation epic INTERPRETATION OF IMAGES: 1. Left  vertebral artery injections: The common and internal carotid arteries had partial thrombosis of the V1 and V2 segment. Complete thrombosis of the V3 segment complete thrombosis of the V4 segment with no flow contrast leading to the basilar artery. 2. Angiographic documentation post carotid Wallstent 6 mm x 22 mm deployment: Stent 1. Was deployed in the distal V2 portion of the vertebral artery post deployment no evidence of in-stent stenosis or thrombosis. 3. Angiographic documentation post carotid Wallstent 6 mm x 22 mm deployment: Stent was deployed in the mid V2 portion angiogram reveals patency of the stent 4. Angiographic documentation post carotid Wallstent 6 mm x 22 mm deployment: Stent was deployed in the proximal V2 to V1 portion. Repeat angiogram reveals that the first and second stent have occluded. 5.Angiographic documentation post carotid Wallstent 6 mm x 22 mm deployment: Stent was deployed from the V4 segment to the subclavian artery. Persistent reocclusion.  6. Angiogram post thrombectomy of the V1 V2 V3 segment successful recanalization however the distal V4 portion seems to have reoccluded. 7. Angiogram post thrombectomy of the intracranial vertebral artery: There appears to be distal embolization into the PCA. 8. Angiogram post thrombectomy of bilateral posterior cerebral arteries: Post thrombectomy images reveal satisfactory recanalization of bilateral posterior cerebral arteries 9. Angiogram post intracranial stenting with Multi-Link stent: Multi-Link stent is placed in the V4 segment no evidence of any in-stent stenosis or thrombosis. 10.Postprocedure left vertebral artery artery injections: Postprocedure all the stents are patent. Restoration of flow to the posterior circulation. No evidence of restenosis artery thrombosis. TICI 3 Intraoperative Ame CT Limited CT with interpretation: Intraoperative Ame CT Limited CT with interpretation was performed using 3-D rotational technique. Axial coronal and sagittal images were reconstructed. There were reviewed and did not show any evidence of intracranial hemorrhage. IMPRESSION 1. 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 Partial from doses of the V1 and V2 segments suspicious for vessel trauma. 2. Successful thrombectomy followed by reocclusion of the left vertebral artery both extracranial and intracranial segments. 3. Complete stent assisted reconstruction of the extracranial left vertebral artery with distal embolic protection. 4. Left posterior cerebral artery and right posterior cerebral artery thrombectomy as a result from artery to artery emboli 5. Successful rescue mechanical intracranial stenting of the V4 segment to prevent reocclusion Patients: If you have questions regarding some of the verbiage in your report, please visit [email or web address] for a definition. If you have any other questions, please contact your physician.      MRI BRAIN WO CONTRAST    Result Date: 1/20/2023  EXAMINATION: MRI OF THE BRAIN WITHOUT CONTRAST  1/20/2023 11:26 am TECHNIQUE: Multiplanar multisequence MRI of the brain was performed without the administration of intravenous contrast. COMPARISON: Head CT dated 01/20/2023 and outside MRI brain dated 01/19/2023 HISTORY: ORDERING SYSTEM PROVIDED HISTORY: stroke protocol TECHNOLOGIST PROVIDED HISTORY: Reason for exam:->stroke protocol What is the sedation requirement?->None What reading provider will be dictating this exam?->CRC FINDINGS: INTRACRANIAL STRUCTURES/VENTRICLES: There is a large area of restricted diffusion in the inferior left cerebellum, as well as the left dorsal medulla, consistent with acute/subacute infarct, primarily in the left posteroinferior cerebellar artery territory. Small area of restricted diffusion again seen in the right cerebellum on series 2, image 11, consistent with acute/subacute infarct. No new area of restricted diffusion is seen. There is associated cytotoxic edema in the left inferior cerebellum with slight effacement of the overlying cortical sulci. No other area of mass effect or midline shift. Tiny focus of low signal is seen in the right ezekiel on gradient echo series 5, image 11. This was present on the prior study from 01/19/2023. It is of indeterminate age but could represent old petechial hemorrhage. Otherwise, no evidence of an acute intracranial hemorrhage. The ventricles and sulci are normal in size and configuration. The sellar/suprasellar regions appear unremarkable. The normal signal voids within the major intracranial vessels appear maintained. ORBITS: The visualized portion of the orbits demonstrate no acute abnormality. SINUSES: The visualized paranasal sinuses and mastoid air cells demonstrate no acute abnormality. There is again severe mucosal thickening in the right sphenoid sinus which is almost completely opacified and moderate mucosal thickening in the left ethmoid sinus as well as mild thickening in other sinuses.  BONES/SOFT TISSUES: The bone marrow signal intensity appears normal. The soft tissues demonstrate no acute abnormality. 1. Bilateral acute/subacute infarcts including left PICA infarct and small lacunar infarct in the right cerebellum, similar compared to the prior study. 2. Probable area of old petechial hemorrhage in the right lateral ezekiel. 3. Otherwise, no new acute intracranial abnormality seen. 4. Chronic sinus disease. IR GUIDED PERC TRANSLUM ART THROMBECTOMY INIT    Result Date: 1/21/2023  IR GUIDED PERC TRANSLUM NONCOR ART THROMBECTOMY INIT, IR INTRACRANIAL STENT(S), IR TRANSCATH PLACE STENT UPPER W PTA INIT ARTERY PROCEDURE PERFORMED: 1. Cerebral angiogram with mechanical thrombectomy of the left extracranial and intracranial portions of the vertebral artery 2. Stent assisted complete reconstruction of the Left  V1 and V2 portions of the extracranial vertebral artery WITH distal embolic protection 3. Recurrent reocclusion of the intracranial vertebral artery status post repeat thrombectomy 4. Artery to artery emboli into the left posterior cerebral artery and the right posterior cerebral artery status post aspiration thrombectomy of the right PCA and the left PCA 5. Successful intracranial stenting ( MULTILINK VISION) of the V4 portion of the left vertebral artery to prevent recurrent occlusion 6. Intraoperative Ame CT Limited CT with interpretation COMPLETED DATE:  1/20/2023 5:00 PM CLINICAL HISTORY/PRE-PROCEDURE DIAGNOSIS: Acute ischemic stroke. Onset of stroke was on January 17. Patient has having recurrent posterior's circulation stroke despite maximal medical therapy. Patient was then transferred from outside hospital to here. Medical therapy maximized with heparin drip. Continues to have more cranial nerve deficits.  Procedure was offered on a humanitarian basis as he is still having strokes on heparin drip POST-PROCEDURE DIAGNOSIS: Successful mechanical thrombectomy successful stent assisted reconstruction/recanalization of the left vertebral artery connecting it to the basilar artery/restoration of flow COMPARISON: CT angiographic documentation done at outside hospital images were transferred into PACS. Data imaging 1/19/2023 MRI 1/20/2023 ANESTHESIA: Conscious sedation and local anesthesia VESSELS CATHETERIZED: 1. Left vertebral artery. 2. Basilar artery. 3. Left posterior cerebral artery. 4 right posterior cerebral artery. 6. Left subclavian artery. RADIATION EXPOSURE DATA:  1177 MG Y TOTAL FLUOROSCOPY TIME: 29 minutes and 36 seconds CONTRAST USED: 125 mL of Isovue-300 PROCEDURE: Following informed consent, the patient was brought to the angiography suite, both groins are prepped and draped in usual sterile manner. Vascular access was obtained in the right common femoral artery with a 8-Tajik sheath which was connected to continuous heparinized saline flush. A 8-Tajik cerebase catheter was prepped and with the support of a diagnostic catheter was brought into the aorta, double flushed and connected to continuous heparinized saline flush. The  guide catheter was then telescoped into the left subclavian artery. Fluoroscopic imaging performed at that time confirmed the presence of previously documented arterial occlusion. Multilevel partial thrombosis all throughout the left vertebral artery severe stenosis at the origin of the left vertebral artery/vertebral subclavian junction. Following this aspiration was initially performed through the cerebral base catheter through the Zoom suction device and then gradually a Zoom 71 was taken up over a glide advantage wire exchange length and continuous aspiration was performed wall pushing the aspiration catheter forward into the left vertebral artery. Large amount of thrombus was identified.  Following this imaging was performed which showed intracranial V4 occlusion of the left vertebral artery and hence using Zoom 35 system through the Zoom 71 system serial aspiration was performed successful thrombectomy was performed until the midportion of the basilar artery. No distal embolization was noticed. On repeat imaging through the proximal vertebral artery there was really occlusion noticed in the V2 segment and hence it was decided to proceed with the stent assisted reconstruction of the entire vessel. A 5 mm spider device was then deployed into the V3 segment of the vertebral artery Patient was started on Integrilin. Initially 1 carotid Wallstent was placed in the distal portionv2 and there is was overlap with the second portion. Again in the V1 portion and initial carotid stent was placed and in the subclavian vertebral junction there is severe stenosis and hence of fourth carotid wall stent was also placed here. After a total of 4 stents were deployed in the V1 and V2 segments angiographic documentation was repeated which showed distal occlusion in the V4 segment and hence a repeat thrombectomy was performed in the V4 segment. This resulted in artery to artery embolization into bilateral posterior cerebral arteries and the basilar tip. Aspiration thrombectomy was performed initially in the left posterior cerebral artery and was successful. Aspiration thrombectomy was then repeated in the right posterior cerebral artery multiple attempts. Following this it was decided to stent the V4 segment that keeps reoccluding and hence balloon mounted stent  3.5mm x 18 mm Multilink vision was taken over and deployed over a 014 wire that was taken into the left vertebral artery and internal taken down into the right vertebral artery for appropriate support to deployed without a intermediate catheter. The balloon mounted stent was successfully deployed at 8 claude. Angiographic documentation repeated with contrast reveals good flow throughout the left vertebral artery this was repeated again at 3 minute interval and was persistently open. Intraoperative Ame CT was obtained which was negative for any stephanie obvious intracranial hemorrhage.  Patient will be maintained on Integrilin drip. Anesthesia was reversed and patient was transferred to the postoperative recovery unit in a stable condition. STROKE ACUTE TIME STAMPS: Please see nursing documentation epic INTERPRETATION OF IMAGES: 1. Left  vertebral artery injections: The common and internal carotid arteries had partial thrombosis of the V1 and V2 segment. Complete thrombosis of the V3 segment complete thrombosis of the V4 segment with no flow contrast leading to the basilar artery. 2. Angiographic documentation post carotid Wallstent 6 mm x 22 mm deployment: Stent 1. Was deployed in the distal V2 portion of the vertebral artery post deployment no evidence of in-stent stenosis or thrombosis. 3. Angiographic documentation post carotid Wallstent 6 mm x 22 mm deployment: Stent was deployed in the mid V2 portion angiogram reveals patency of the stent 4. Angiographic documentation post carotid Wallstent 6 mm x 22 mm deployment: Stent was deployed in the proximal V2 to V1 portion. Repeat angiogram reveals that the first and second stent have occluded. 5.Angiographic documentation post carotid Wallstent 6 mm x 22 mm deployment: Stent was deployed from the V4 segment to the subclavian artery. Persistent reocclusion. 6. Angiogram post thrombectomy of the V1 V2 V3 segment successful recanalization however the distal V4 portion seems to have reoccluded. 7. Angiogram post thrombectomy of the intracranial vertebral artery: There appears to be distal embolization into the PCA. 8. Angiogram post thrombectomy of bilateral posterior cerebral arteries: Post thrombectomy images reveal satisfactory recanalization of bilateral posterior cerebral arteries 9. Angiogram post intracranial stenting with Multi-Link stent: Multi-Link stent is placed in the V4 segment no evidence of any in-stent stenosis or thrombosis. 10.Postprocedure left vertebral artery artery injections: Postprocedure all the stents are patent.  Restoration of flow to the posterior circulation. No evidence of restenosis artery thrombosis. TICI 3 Intraoperative Ame CT Limited CT with interpretation: Intraoperative Ame CT Limited CT with interpretation was performed using 3-D rotational technique. Axial coronal and sagittal images were reconstructed. There were reviewed and did not show any evidence of intracranial hemorrhage. IMPRESSION 1. 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 Partial from doses of the V1 and V2 segments suspicious for vessel trauma. 2. Successful thrombectomy followed by reocclusion of the left vertebral artery both extracranial and intracranial segments. 3. Complete stent assisted reconstruction of the extracranial left vertebral artery with distal embolic protection. 4. Left posterior cerebral artery and right posterior cerebral artery thrombectomy as a result from artery to artery emboli 5. Successful rescue mechanical intracranial stenting of the V4 segment to prevent reocclusion Patients: If you have questions regarding some of the verbiage in your report, please visit [email or web address] for a definition. If you have any other questions, please contact your physician. US DUP LOWER EXTREMITIES BILATERAL VENOUS    Result Date: 1/21/2023  EXAMINATION: DUPLEX VENOUS ULTRASOUND OF THE BILATERAL LOWER EXTREMITIES1/21/2023 1:54 pm TECHNIQUE: Duplex ultrasound using B-mode/gray scaled imaging, Doppler spectral analysis and color flow Doppler was obtained of the deep venous structures of the lower bilateral extremities. COMPARISON: None. HISTORY: ORDERING SYSTEM PROVIDED HISTORY: r/o DVT TECHNOLOGIST PROVIDED HISTORY: Reason for exam:->r/o DVT What reading provider will be dictating this exam?->CRC FINDINGS: The visualized veins of the bilateral lower extremities are patent and free of echogenic thrombus. The common femoral veins and iliac veins are limited evaluation due to the presence of bandages.   The veins of the lower extremity however demonstrate good compressibility with normal color flow study and spectral analysis. No evidence of DVT in either lower extremity. ASSESSMENT:  78 y.o. 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 2-3mm glottic gap present. Pt is amenable to intubation with small diameter tube if necessary. PLAN:  Pt seen and examined, swallow study reviewed, NPL performed at bedside with findings above. Recommend short course of decadron, 10mg q8 for 3 doses for swelling if ok per neurology. Continue current diet recommendations per speech pathology. Recommend outpatient follow up in 4 weeks with Dr. Eric Summers for repeat scope exam.     Patient seen and examined by resident and with attending on call Dr. Eric Summers.     Benny Montgomery DO,  Resident Physician, PGY-2  Department of Otolaryngology, [facility])         Electronically signed by Benny Montgomery DO on 1/25/23 at 3:23 PM EST

## 2023-01-25 NOTE — CARE COORDINATION
1/25/2023 social work transition of care planning  Pt plan is to [facility], will need precert(waived until 8/22). Sw will check for discharge. No covid test needed.   Electronically signed by HARJIT Buckley on 1/25/2023 at 10:52 AM

## 2023-01-25 NOTE — PROGRESS NOTES
ENT consult sent to resident Dr. Skyla Berrios  Electronically signed by William Morris RN on 1/25/2023 at 11:54 AM    Will be up to see pt.   Electronically signed by William Morris RN on 1/25/2023 at 11:58 AM

## 2023-01-25 NOTE — PROGRESS NOTES
Physical Therapy    Physical Therapy Daily Treatment Note      Name: Jordon Colindres  : 1955  MRN: [id number]      Date of Service: 2023    Evaluating PT:  Octaviano Will, PT, DPT  [id number]     Room #:  5414/5414-A  Diagnosis:  Acute ischemic stroke (HCC) [I63.9]  PMHx/PSHx:   has no past medical history on file.   Procedure/Surgery:  Left vertebral artery occlusion s/p thrombectomy and stent per Neuro IR   Precautions:  Falls, L coordination deficits, L visual field disturbance   Equipment Needs:  TBD    SUBJECTIVE:    Pt lives alone with his dog in a 2 story home with 2 stairs and 0 rail to enter.  Bedroom is on the 1st level and bathroom is on the 2nd level with full flight of steps and rail.  Pt ambulated with no AD and independent PTA.    OBJECTIVE:   Initial Evaluation  Date: 23 Treatment  23 Short Term/ Long Term   Goals   AM-PAC 6 Clicks      Was pt agreeable to Eval/treatment? Yes  Yes     Does pt have pain? No c/o pain  No c/o pain     Bed Mobility  Rolling: NT  Supine to sit: NT  Sit to supine: Mod A  Scooting: Mod A Rolling: SBA  Supine to sit: SBA  Sit to supine: NT  Scooting: SBA to EOB  Rolling: SBA  Supine to sit: SBA  Sit to supine: SBA  Scooting: SBA   Transfers Sit to stand: Max A  Stand to sit: Max A  Stand pivot: Max A with no AD Sit to stand: Mod A  Stand to sit: Mod A  Stand pivot: Mod A with no AD Sit to stand: SBA  Stand to sit: SBA  Stand pivot: Min A with AAD   Ambulation    Few feet with FWW Max A > Max A x2 with LOB  20 feet, 10 feet with FWW Mod A >50 feet with AAD Min A   Stair negotiation: ascended and descended  NT NT > 2 steps with B rail Min A   ROM BUE:  WFL  BLE:  WFL     Strength BUE:  grossly 4+/5  BLE:  grossly 5/5     Balance Sitting EOB:  SBA  Dynamic Standing:  Max A  Sitting EOB:  SBA  Dynamic Standing:  Mod A  Sitting EOB:  SBA  Dynamic Standing:  Min A     Pt is A & O x 4  Sensation:  Pt denies numbness and tingling to  extremities  Edema:  Unremarkable  Coordination:  LUE and LLE proprioception deficits     Therapeutic Exercises:    BLE AROM  STS x3 reps     Patient education  Pt educated on safety during mobility, standing balance/posture, gait training    Patient response to education:   Pt verbalized understanding Pt demonstrated skill Pt requires further education in this area   Yes  Yes  Reinforce      ASSESSMENT:    Conditions Requiring Skilled Therapeutic Intervention:    []Decreased strength     []Decreased ROM  [x]Decreased functional mobility  [x]Decreased balance   [x]Decreased endurance   [x]Decreased posture  []Decreased sensation  [x]Decreased coordination   [x]Decreased vision  [x]Decreased safety awareness   []Increased pain       Comments:  Pt received sitting at EOB and agreeable to PT treatment with OT collab. Pt continues to improve physically. Still with coordination and proprioception deficits of LUE and LLE. Pt assisted with transfer bed>chair. Ambulates using FWW with assistance to place LUE on  of walker. Pt ambulates with slower gait speed today but improved balance. L lateral lean increased with fatigue as pt's ABA becomes more narrow (LLE towards RLE). Pt able to correct LLE and ABA with cues. Pt able to correct posture with cues. Requires seated rest d/t fatigue. Transport bed arrived for swallow exam, pt ambulated to transport bed after seated rest.  Left under care of transport team.     Pt demonstrates balance and coordination deficits that would be best addressed in an intensive rehab setting.   Pt demonstrates good rehab potential.      Treatment:  Patient practiced and was instructed in the following treatment:    Bed mobility training - pt given verbal and tactile cues to facilitate proper sequencing and safety during sit>supine as well as provided with physical assistance to complete task    STS and pivot transfer training - pt educated on proper hand and foot placement, safety and sequencing, and use of FWW vs No AD to safely complete sit<>stand and pivot transfers with hands on assistance to complete task safely    Gait training- pt was given verbal and tactile cues to facilitate safety/balance, posture, ABA, weight shifting, FWW utilization, gait mechanics during ambulation as well as provided with physical assistance. PHYSICAL THERAPY PLAN OF CARE:    Pt is making good progress towards established goals. Continue PT POC.      Specific instructions for next treatment:  progress transfers and gait, recommend two person assistance for gait safety d/t dizziness and LOB    Time in  0915  Time out  0940    Total Treatment Time  25 minutes     CPT codes:  [] Low Complexity PT evaluation 60073  [] Moderate Complexity PT evaluation 39745  [] High Complexity PT evaluation 75565  [] PT Re-evaluation 65062  [] Gait training 01853 -- minutes  [] Manual therapy 80466 -- minutes  [x] Therapeutic activities 31283 25 minutes  [] Therapeutic exercises 90717 - minutes  [] Neuromuscular reeducation 45938 -- minutes     Mj Brannon, PT, DPT  [id number]

## 2023-01-25 NOTE — PROGRESS NOTES
[address] [id number] [facility]  [address] [place] [address]                                                               Patient Name: Fermin Hale  MRN: [id number]  : 1955  Room: [address] [place] [address]       Evaluating OT: Rikki Neal OTR/L; [id number]     Referring Provider: FLAVIA Zuñiga CNP   Specific Provider Orders/Date: OT eval and treat (23)       Diagnosis: Acute ischemic stroke [facility]) [I63.9]     Reason for admission: Pt presenting with difficulty ambulating, garbled speech, & facial droop. Surgery/Procedures: 23 IR TRANSCATHETER INTRAVASCULAR UPPER PERIPH STENT INTRO, IR INTRACRANIAL STENT(S), IR MECHANICAL ART THROMBECTOMY INIT     Pertinent Medical History:    History reviewed. No pertinent past medical history. *Precautions:  Fall Risk, L lateral lean, LUE dysmetria, seizure precautions, dysphagia diet - pureed & moderately thick (honey), L visual deficits (none noted upon eval)    Assessment of current deficits   [x] Functional mobility  [x]ADLs  [x] Strength               []Cognition   [x] Functional transfers   [x] IADLs         [x] Safety Awareness   [x]Endurance   [x] Fine Coordination        [] ROM     [] Vision/perception   []Sensation    [x]Gross Motor Coordination [x] Balance   [] Delirium                  [x]Motor Control     [] Communication    OT PLAN OF CARE   OT POC based on physician orders, patient diagnosis and results of clinical assessment.        Frequency/Duration: 3-5 days/wk for 1-2 weeks PRN    Specific OT Treatment Interventions to include:   * Instruction/training on adapted ADL techniques and AE recommendations to increase functional independence within precautions       * Training on energy conservation strategies, correct breathing pattern and techniques to improve independence/tolerance for self-care routine  * Functional transfer/mobility training/DME recommendations for increased independence, safety, and fall prevention  * Patient/Family education to increase follow through with safety techniques and functional independence  * Recommendation of environmental modifications for increased safety with functional transfers/mobility and ADLs  * Sensory re-education to improve body/limb awareness, maintain/improve skin integrity, and improve hand/UE motor function  * Therapeutic exercise to improve motor endurance, ROM, and functional strength for ADLs/functional transfers  * Therapeutic activities to facilitate/challenge dynamic balance, stand tolerance for increased safety and independence with ADLs  * Therapeutic activities to facilitate gross/fine motor skills for increased independence with ADLs  * Neuro-muscular re-education: facilitation of righting/equilibrium reactions, midline orientation, scapular stability/mobility, normalization of muscle tone, and facilitation of volitional active controled movement  * Positioning to improve skin integrity, interaction with environment and functional independence    Modified Jillian Scale   Score     Description  0             No symptoms  1             No significant disability despite symptoms  2             Slight disability; able to look after own affairs  3             Moderate disability; able to ambulate without assist/ requires assist with ADLs  4             Moderate/Severe disability;requires assist to ambulate/assist with ADLs  5             Severe disability;bedridden/incontinent   6               Score:   4    Recommended Adaptive Equipment: TBD pending progress     Home Living: Pt lives alone in a 2 story home with 2 step(s) to enter and 0 rail(s); bed/bath on 1st floor. [facility] [address] [facility]: None    Prior Level of Function: IND with ADLs;  IND with IADLs. No use of AD for functional mobility.    Driving: Yes  Occupation: Caring for pet dog    Pain Level: pt c/o 0/10 pain this session    Cognition: A&O: 4/4 ; Follows 1 step commands   Memory: Good   Comprehension: Fair-   Problem solving: Fair   Judgement/safety: Fair-               Communication skills: WFL           Vision: WFL               Glasses: Yes                                                   Hearing: WFL     RASS: 0   CAM-ICU: (NT) Delirium     Functional Assessment:  AM-PAC Daily Activity Raw Score: 13/24   Initial Eval Status  Date: 1/24/23 Treatment Status  Date: 1/25/23 STGs = LTGs  Time frame: 7-14 days   Feeding Min A  Decreased FMC LUE Min A                     Independent       Grooming Mod A overall/Min A  Pt able to wash face with RUE seated EOB. Mod A                     Modified [place]        UB dressing/bathing Mod A  Decreased coordination of LUE during participation of light bathing task seated EOB. Mod A                  SBA       LB dressing/bathing Max A  To don/doff socks seated EOB with hand over hand assist of LUE for [address]. Mod A  To don pants in seated position. Verbal cues for compensatory techniques to improve proprioception. SBA        Toileting Max A Max A                     SBA     Bed Mobility  Supine to sit:   Min A    Sit to supine:   NT Supine to sit:   SBA    Sit to supine:   SBA                       Modified Island     Functional Transfers Sit to stand: Mod A    Stand to sit:   Mod A    Stand Pivot: Mod A Sit to stand: Mod A    Stand to sit:   Mod A    Stand Pivot: Mod A                     SBA     Functional Mobility Mod A progressing to Max A w/ fatigue - increased L lateral lean w/ use of ww. Mod A with FWW  For short household distance in room. Cues for L sided lean, safety with FWW. SBA with use of Appropriate AD        Balance Sitting:     Static:  SBA    Dynamic:Min A  Standing: Mod A w/ ww Sitting:     Static:  SBA    Dynamic:Min A  Standing:  Mod A w/ ww Sitting:     Static: Independent    Dynamic:Independent  Standing: SBA                   Endurance/Activity Tolerance   Fair tolerance with light activity. Fair tolerance with light activity. Good   Visual/  Perceptual Appears WFL - no c/o blurry vision at this time. Appears functional        UE Assessment:  Hand Dominance: Right [x]  Left []     ROM Strength STM goal: PRN   RUE  WFL 4+/5  : Fair+  FMC: Fair+  GMC: Fair+ Improve overall RUE strength WFL for participation in functional tasks       LUE WFL 4+/5  : Fair+  FMC: Poor  GMC: Poor  Noted dysmetria of LUE. Improve overall LUE strength/coordination WFL for participation in functional tasks           Sensation: No c/o numbness/tingling in allie extremities. Tone: WNL   Edema: Unremarkable    Treatment: OT treatment provided this date includes:     Instruction/training on safety and adapted techniques for completion of ADLs:  to increase independence and safety in self-care. Pt sat in chair to engage in ADLs. Pt educated on adaptive technique to to assist with deficits of proprioception and coordination. Pt with fair- reception to education, limited by impaired attention. Instruction/training on safe bed mobility/functional mobility/transfer techniques: with focus on safety, body mechanics, and precautions. Therapeutic activity: to challenge dynamic sitting/standing balance and endurance to promote safety during ADL tasks and functional transfers and mobility. Line management and environmental modifications made prior to and end of session to ensure patient safety and to increase efficiency of session. Skilled monitoring of HR, O2 saturation, blood pressure and patient's response to activity performed throughout session. Comments: OK from RN to see patient. Upon arrival, patient supine in bed & agreeable to OT session with no family present. Pt pleasant and agreeable to participate in therapy session.   Pt demo'd fair tolerance with understanding of education/techniques. Therapist facilitated ADL tasks, functional transfers, functional mobility, bed mobility to address safety awareness, implementation of compensatory methods for completion of ADLs. At end of session, patient with transport upon exit for swallow study. Nursing aware.     Time In: 0915             Time Out: 2491         Total Treatment time: 26 minutes   Min Units   OT Eval Low 54602     OT Eval Medium 76887     OT Eval High [id number]     OT Re-Eval [id number]     Therapeutic Ex [id number]     Therapeutic Activities 29056 91 8   ADL/Self Care 17840 10 1   Orthotic Management 48676     Neuro Re-Ed 50005     Non-Billable Time        Pablo Georges [address] [facility] [place], OTR/L, [id number]

## 2023-01-25 NOTE — CARE COORDINATION
Spoke with the patient at the bedside. Patient will not have support at home at discharge. Patient also does not think he can tolerate three hours of therapy a day and is requesting to discharge to a [facility]. SW on unit updated.

## 2023-01-25 NOTE — PROGRESS NOTES
[facility]   Progress note      Subjective:    Looks well  No other acute issues or complaints  Following commands answering questions  Objective:    BP (!) 139/96   Pulse 85   Temp 98.4 °F (36.9 °C)   Resp 16   Ht 6' (1.829 m)   Wt 150 lb (68 kg)   SpO2 98%   BMI 20.34 kg/m²     In: 220 [P.O.:200; I.V.:20]  Out: 1075   In: 220   Out: 1075 [Urine:1075]    General appearance: NAD, conversant and answering questions appropriately  HEENT: AT/NC, MMM  Neck: FROM, supple  Lungs: Clear to auscultation  CV: RRR, no MRGs  Vasc: Radial pulses 2+  Abdomen: Soft, non-tender; no masses or HSM  Extremities: No peripheral edema or digital cyanosis  Skin: no rash, lesions or ulcers    Recent Labs     01/22/23  0503 01/23/23  0435 01/24/23  0403   WBC 6.3 5.1 5.9   HGB 10.1* 9.7* 11.6*   HCT 28.4* 27.6* 33.5*    243 309       Recent Labs     01/22/23  0503 01/23/23  0435 01/24/23  0403    135 136   K 3.6 3.6 4.1    103 101   CO2 19* 20* 23   BUN 10 6 10   CREATININE 0.6* 0.6* 0.7   CALCIUM 8.1* 8.2* 9.1       Assessment:    Principal Problem:    Cerebellar stroke, acute (HCC)  Active Problems:    Vertebral artery dissection (HCC)    Chronic obstructive pulmonary disease (HCC)    Sinus bradycardia  Resolved Problems:    * No resolved hospital problems.  *      Plan:    72-year-old male who developed slurred speech and difficulty walking presented to an outside facility was diagnosed with subacute infarct and was transferred to [address] [place] [address] MICU with     Acute ischemic cerebellar stroke  Patient agitated and confused and really not able to provide much history  MRA/CTA from [address]-thrombus left vertebral artery dissection versus left vertebral artery thromboembolism  Low-dose heparin drip-with titration parameters  1/20/2023-underwent extensive reconstruction, thrombectomy and vertebral artery stenting-please electronic medical  Radiology's procedure notes below  ASA and Brilinta, Integrilin drip has been initiated   high intensity statin, blood pressure control  Swallow study once patient able to tolerate  Echocardiogram - pending  Ultrasound bilateral lower extremity-rule out DVT  IV hydration  Smoking cessation  Neurology and neuro interventional radiology following     Bradycardia of unknown etiology  Hold offending agents  Keep atropine at bedside  Check lipid panel  Consult EP,-await input possible pacemaker placement    1/21/2023  In four-point restraints today-soft bilaterally  Agitated confused unable to provide much of the history  Echocardiogram completed with ejection fraction 60 to 65%-no evidence of shunt or PFO  Head CT to be completed if patient can tolerate without being agitated-increase Precedex for this  Case discussed with Dr. Willam Rod radiology, EP, vascular, critical care team all on board  Consider phenobarbital for severe alcohol withdrawal symptoms use  For now he is okay on Precedex  Remains on off Cardene, Integrilin, drips  Continue dual antiplatelet therapy with Brilinta and aspirin  Medicine will continue to follow    1/22/2023  Out of restraints  Following commands  Continue to monitor mentation  Remains on dual antiplatelet therapy due to placement of vertebral reconstruction with stents, high intensity statin  Off Integrilin  Watch for signs of bleeding  No significant symptoms of alcohol withdrawals, if any ensue would use phenobarbital or Ativan  Okay to transfer out of ICU setting from medicine standpoint  Neurology to continue to follow along with neuro interventional radiology    1/23/2023  No acute events overnight  Remains restraint free  Following commands and answering questions appropriately  Okay for transfer out of ICU from medicine standpoint  Continue Brilinta and aspirin, high intensity statin, BP control  Monitor H&H-9.7/27 today  Initiate PT OT once out of ICU    1/24/2023  Transferring out of ICU   No acute issues, behaviorally/neurologically much improved in terms of agitation  DC plan once rehab is finalized  PT OT eval-AMPAC  score 13/24  A.m. labs  Continue DAPT-Brilinta/aspirin for stents placed and vertebral artery reconstruction, high intensity statin,   blood pressure control    Code Status: Full code  Consultants: Neurology, neuro interventional radiology, ICU team  DVT Prophylaxis   PT/OT  Discharge planning           Kamini Lara MD  7:48 PM  1/24/2023

## 2023-01-25 NOTE — DISCHARGE INSTR - COC
Continuity of Care Form    Patient Name: Asael Ordonez   :  1955  MRN:  [id number]    Admit date:  2023  Discharge date:  2023    Code Status Order: Full Code   Advance Directives:     Admitting Physician:  Torin Reeves MD  PCP: Saw Contreras MD    Discharging Nurse: [facility] Unit/Room#: 5302/8470-I  Discharging Unit Phone Number: [phone]    Emergency Contact:   Extended Emergency Contact Information  Primary Emergency Contact: Rosey Choudhary  Home Phone: [phone]  Relation: Parent  Secondary Emergency Contact: Elio Wellington Phone: [phone]  Relation: Brother/Sister   needed? No    Past Surgical History:  Past Surgical History:   Procedure Laterality Date    HERNIA REPAIR      IR INTRACRANIAL STENT(S)  2023    IR INTRACRANIAL STENT(S) 2023 Arian Kahn MD [facility]    IR MECHANICAL ART THROMBECTOMY INIT  2023    IR MECHANICAL ART THROMBECTOMY INIT 2023 Arian Kahn MD [facility]    IR TRANSCATHETER INTRAVASCULAR UPPER PERIPH STENT INTRO  2023    IR TRANSCATHETER INTRAVASCULAR UPPER PERIPH STENT INTRO 2023 Arian Kahn MD [facility]       Immunization History: There is no immunization history on file for this patient.     Active Problems:  Patient Active Problem List   Diagnosis Code    Cerebellar stroke, acute ([facility] [address] I63.9    Vertebral artery dissection (HCC) I77.74    Chronic obstructive pulmonary disease (HCC) J44.9    Sinus bradycardia R00.1       Isolation/Infection:   Isolation            No Isolation          Patient Infection Status       Infection Onset Added Last Indicated Last Indicated By Review Planned Expiration Resolved Resolved By    None active    Resolved    COVID-19 (Rule Out) 23 Respiratory Panel, Molecular, with COVID-19 (Restricted: peds pts or suitable admitted adults) (Ordered)   23 Rule-Out Test Resulted            Nurse Assessment:  Last Vital Signs: BP (!) 114/93   Pulse 96   Temp 97.2 °F (36.2 °C) (Temporal)   Resp 16   Ht 6' (1.829 m)   Wt 150 lb (68 kg)   SpO2 96%   BMI 20.34 kg/m²     Last documented pain score (0-10 scale): Pain Level: 0  Last Weight:   Wt Readings from Last 1 Encounters:   23 150 lb (68 kg)     Mental Status:  {IP PT MENTAL STATUS:}    IV Access:  { JAUN IV ACCESS:548866402}    Nursing Mobility/ADLs:  Walking   {P DME TAOY:353975951}  Transfer  {P DME NVRC:607506378}  Bathing  {P DME RIZF:021955840}  Dressing  {P DME JGNJ:990111715}  Toileting  {P DME ORNR:445008265}  Feeding  {P DME QWW}  Med Admin  {[facility] DME OZWS:989803924}  Med Delivery   prefers mixed with pudding or applesauce    Wound Care Documentation and Therapy:  Puncture 23 Femoral (Active)   Dressing Status Clean, dry & intact 23   Dressing/Treatment Other (comment) 23   Number of days: 4        Elimination:  Continence: Bowel: {YES / YS:12226}  Bladder: {YES / ZT:94354}  Urinary Catheter: {Urinary Catheter:183393145}   Colostomy/Ileostomy/Ileal Conduit: {YES / OE:87872}       Date of Last BM: 2023    Intake/Output Summary (Last 24 hours) at 2023 1047  Last data filed at 2023 0602  Gross per 24 hour   Intake 210 ml   Output 625 ml   Net -415 ml     I/O last 3 completed shifts: In: 230 [P.O.:200;  I.V.:30]  Out: 1300 [Urine:1300]    Safety Concerns:     { JAUN Safety Concerns:531482093}    Impairments/Disabilities:      { JAUN Impairments/Disabilities:628197003}    Nutrition Therapy:  Current Nutrition Therapy:   [facility] JAUN Diet List:836530848}    Routes of Feeding: {[facility] DME Other Feedings:408939473}  Liquids: [place] Thick Liquids  Daily Fluid Restriction: no  Last Modified Barium Swallow with Video (Video Swallowing Test): done on ***/***    Treatments at the Time of Hospital Discharge:   Respiratory Treatments: ***  Oxygen Therapy:  {Therapy; copd oxygen:43048}  Ventilator:    {MH CC Vent AGAC:511389358}    Rehab Therapies: {THERAPEUTIC INTERVENTION:4373985871}  Weight Bearing Status/Restrictions: 50Tom MARINELLI Weight Bearin}  Other Medical Equipment (for information only, NOT a DME order):  {EQUIPMENT:494694261}  Other Treatments: ***    Patient's personal belongings (please select all that are sent with patient):  {[facility] DME Belongings:434269130}    RN SIGNATURE:  Electronically signed by Freda Elizabeth RN on 23 at 3:51 PM EST    CASE MANAGEMENT/SOCIAL WORK SECTION    Inpatient Status Date: ***    Readmission Risk Assessment Score:  Readmission Risk              Risk of Unplanned Readmission:  11           Discharging to Facility/ Agency   Name: [facility]  Address:  Phone:  Fax:    Dialysis Facility (if applicable)   Name:  Address:  Dialysis Schedule:  Phone:  Fax:    / signature: Electronically signed by HARJIT Day on 23 at 10:47 AM EST    PHYSICIAN SECTION    Prognosis: {Prognosis:0075555973}    Condition at Discharge: 50Tom Chan Patient Condition:513987707}    Rehab Potential (if transferring to Rehab): {Prognosis:6358136095}    Recommended Labs or Other Treatments After Discharge: ***    Physician Certification: I certify the above information and transfer of Nora Esqueda  is necessary for the continuing treatment of the diagnosis listed and that he requires [facility] for less 30 days.      Update Admission H&P: {CHP DME Changes in GBZIV:442730811}    PHYSICIAN SIGNATURE:  {Esignature:191198645}

## 2023-01-26 VITALS
HEIGHT: 72 IN | SYSTOLIC BLOOD PRESSURE: 137 MMHG | DIASTOLIC BLOOD PRESSURE: 90 MMHG | OXYGEN SATURATION: 98 % | HEART RATE: 81 BPM | TEMPERATURE: 97.7 F | RESPIRATION RATE: 18 BRPM | BODY MASS INDEX: 20.32 KG/M2 | WEIGHT: 150 LBS

## 2023-01-26 LAB
ALBUMIN SERPL-MCNC: 4 G/DL (ref 3.5–5.2)
ALP BLD-CCNC: 115 U/L (ref 40–129)
ALT SERPL-CCNC: 20 U/L (ref 0–40)
ANION GAP SERPL CALCULATED.3IONS-SCNC: 12 MMOL/L (ref 7–16)
AST SERPL-CCNC: 26 U/L (ref 0–39)
BASOPHILS ABSOLUTE: 0.04 E9/L (ref 0–0.2)
BASOPHILS RELATIVE PERCENT: 0.5 % (ref 0–2)
BILIRUB SERPL-MCNC: 0.4 MG/DL (ref 0–1.2)
BUN BLDV-MCNC: 20 MG/DL (ref 6–23)
CALCIUM IONIZED: 1.3 MMOL/L (ref 1.15–1.33)
CALCIUM SERPL-MCNC: 9 MG/DL (ref 8.6–10.2)
CHLORIDE BLD-SCNC: 100 MMOL/L (ref 98–107)
CO2: 25 MMOL/L (ref 22–29)
CREAT SERPL-MCNC: 0.8 MG/DL (ref 0.7–1.2)
EOSINOPHILS ABSOLUTE: 0.21 E9/L (ref 0.05–0.5)
EOSINOPHILS RELATIVE PERCENT: 2.6 % (ref 0–6)
GFR SERPL CREATININE-BSD FRML MDRD: >60 ML/MIN/1.73
GLUCOSE BLD-MCNC: 113 MG/DL (ref 74–99)
HCT VFR BLD CALC: 32.2 % (ref 37–54)
HEMOGLOBIN: 11.4 G/DL (ref 12.5–16.5)
IMMATURE GRANULOCYTES #: 0.02 E9/L
IMMATURE GRANULOCYTES %: 0.2 % (ref 0–5)
LYMPHOCYTES ABSOLUTE: 2.37 E9/L (ref 1.5–4)
LYMPHOCYTES RELATIVE PERCENT: 29.6 % (ref 20–42)
MAGNESIUM: 1.9 MG/DL (ref 1.6–2.6)
MCH RBC QN AUTO: 31.1 PG (ref 26–35)
MCHC RBC AUTO-ENTMCNC: 35.4 % (ref 32–34.5)
MCV RBC AUTO: 88 FL (ref 80–99.9)
MONOCYTES ABSOLUTE: 0.91 E9/L (ref 0.1–0.95)
MONOCYTES RELATIVE PERCENT: 11.4 % (ref 2–12)
NEUTROPHILS ABSOLUTE: 4.46 E9/L (ref 1.8–7.3)
NEUTROPHILS RELATIVE PERCENT: 55.7 % (ref 43–80)
PDW BLD-RTO: 13.1 FL (ref 11.5–15)
PHOSPHORUS: 3.3 MG/DL (ref 2.5–4.5)
PLATELET # BLD: 401 E9/L (ref 130–450)
PMV BLD AUTO: 8.9 FL (ref 7–12)
POTASSIUM SERPL-SCNC: 4.3 MMOL/L (ref 3.5–5)
RBC # BLD: 3.66 E12/L (ref 3.8–5.8)
SODIUM BLD-SCNC: 137 MMOL/L (ref 132–146)
TOTAL PROTEIN: 7.5 G/DL (ref 6.4–8.3)
WBC # BLD: 8 E9/L (ref 4.5–11.5)

## 2023-01-26 PROCEDURE — 97530 THERAPEUTIC ACTIVITIES: CPT

## 2023-01-26 PROCEDURE — 80053 COMPREHEN METABOLIC PANEL: CPT

## 2023-01-26 PROCEDURE — 82330 ASSAY OF CALCIUM: CPT

## 2023-01-26 PROCEDURE — 83735 ASSAY OF MAGNESIUM: CPT

## 2023-01-26 PROCEDURE — 84100 ASSAY OF PHOSPHORUS: CPT

## 2023-01-26 PROCEDURE — 6370000000 HC RX 637 (ALT 250 FOR IP): Performed by: INTERNAL MEDICINE

## 2023-01-26 PROCEDURE — 2580000003 HC RX 258: Performed by: PSYCHIATRY & NEUROLOGY

## 2023-01-26 PROCEDURE — 6370000000 HC RX 637 (ALT 250 FOR IP): Performed by: PSYCHIATRY & NEUROLOGY

## 2023-01-26 PROCEDURE — 94640 AIRWAY INHALATION TREATMENT: CPT

## 2023-01-26 PROCEDURE — 92526 ORAL FUNCTION THERAPY: CPT

## 2023-01-26 PROCEDURE — 36415 COLL VENOUS BLD VENIPUNCTURE: CPT

## 2023-01-26 PROCEDURE — 85025 COMPLETE CBC W/AUTO DIFF WBC: CPT

## 2023-01-26 PROCEDURE — 97535 SELF CARE MNGMENT TRAINING: CPT

## 2023-01-26 PROCEDURE — 6370000000 HC RX 637 (ALT 250 FOR IP)

## 2023-01-26 RX ORDER — ATORVASTATIN CALCIUM 80 MG/1
80 TABLET, FILM COATED ORAL NIGHTLY
Qty: 30 TABLET | Refills: 3 | Status: SHIPPED | OUTPATIENT
Start: 2023-01-26

## 2023-01-26 RX ORDER — ASPIRIN 81 MG/1
81 TABLET, CHEWABLE ORAL DAILY
Qty: 30 TABLET | Refills: 3 | Status: SHIPPED | OUTPATIENT
Start: 2023-01-27

## 2023-01-26 RX ADMIN — BUDESONIDE 500 MCG: 0.5 SUSPENSION RESPIRATORY (INHALATION) at 09:04

## 2023-01-26 RX ADMIN — SODIUM CHLORIDE, PRESERVATIVE FREE 10 ML: 5 INJECTION INTRAVENOUS at 08:55

## 2023-01-26 RX ADMIN — TICAGRELOR 90 MG: 90 TABLET ORAL at 08:49

## 2023-01-26 RX ADMIN — ARFORMOTEROL TARTRATE 15 MCG: 15 SOLUTION RESPIRATORY (INHALATION) at 09:04

## 2023-01-26 RX ADMIN — ASPIRIN 81 MG CHEWABLE TABLET 81 MG: 81 TABLET CHEWABLE at 08:49

## 2023-01-26 RX ADMIN — PANTOPRAZOLE SODIUM 40 MG: 40 TABLET, DELAYED RELEASE ORAL at 05:48

## 2023-01-26 RX ADMIN — ACETAMINOPHEN 650 MG: 325 TABLET ORAL at 08:48

## 2023-01-26 RX ADMIN — FOLIC ACID 1 MG: 1 TABLET ORAL at 08:49

## 2023-01-26 RX ADMIN — Medication 1 TABLET: at 08:50

## 2023-01-26 RX ADMIN — Medication 100 MG: at 08:49

## 2023-01-26 ASSESSMENT — PAIN SCALES - GENERAL
PAINLEVEL_OUTOF10: 5
PAINLEVEL_OUTOF10: 5

## 2023-01-26 ASSESSMENT — PAIN DESCRIPTION - DESCRIPTORS: DESCRIPTORS: ACHING;THROBBING

## 2023-01-26 ASSESSMENT — PAIN DESCRIPTION - LOCATION: LOCATION: HEAD

## 2023-01-26 ASSESSMENT — PAIN DESCRIPTION - ORIENTATION: ORIENTATION: ANTERIOR

## 2023-01-26 NOTE — PROGRESS NOTES
[address] [address] [address]       RLMS:                                                               Patient Name: Amarilis Wells  MRN: [id number]  : 1955  Room: [address] [place] [address]       Evaluating OT: Minnie Bob OTR/L; [id number]     Referring Provider: FLAVIA Muse CNP   Specific Provider Orders/Date: OT eval and treat (23)       Diagnosis: Acute ischemic stroke [facility]) [I63.9]     Reason for admission: Pt presenting with difficulty ambulating, garbled speech, & facial droop. Surgery/Procedures: 23 IR TRANSCATHETER INTRAVASCULAR UPPER PERIPH STENT INTRO, IR INTRACRANIAL STENT(S), IR MECHANICAL ART THROMBECTOMY INIT     Pertinent Medical History:    History reviewed. No pertinent past medical history. *Precautions:  Fall Risk, L lateral lean, LUE dysmetria, seizure precautions, dysphagia diet - pureed & moderately thick (honey), L visual deficits (none noted upon eval)    Assessment of current deficits   [x] Functional mobility  [x]ADLs  [x] Strength               []Cognition   [x] Functional transfers   [x] IADLs         [x] Safety Awareness   [x]Endurance   [x] Fine Coordination        [] ROM     [] Vision/perception   []Sensation    [x]Gross Motor Coordination [x] Balance   [] Delirium                  [x]Motor Control     [] Communication    OT PLAN OF CARE   OT POC based on physician orders, patient diagnosis and results of clinical assessment.        Frequency/Duration: 3-5 days/wk for 1-2 weeks PRN    Specific OT Treatment Interventions to include:   * Instruction/training on adapted ADL techniques and AE recommendations to increase functional independence within precautions       * Training on energy conservation strategies, correct breathing pattern and techniques to improve independence/tolerance for self-care routine  * Functional transfer/mobility training/DME recommendations for increased independence, safety, and fall prevention  * Patient/Family education to increase follow through with safety techniques and functional independence  * Recommendation of environmental modifications for increased safety with functional transfers/mobility and ADLs  * Sensory re-education to improve body/limb awareness, maintain/improve skin integrity, and improve hand/UE motor function  * Therapeutic exercise to improve motor endurance, ROM, and functional strength for ADLs/functional transfers  * Therapeutic activities to facilitate/challenge dynamic balance, stand tolerance for increased safety and independence with ADLs  * Therapeutic activities to facilitate gross/fine motor skills for increased independence with ADLs  * Neuro-muscular re-education: facilitation of righting/equilibrium reactions, midline orientation, scapular stability/mobility, normalization of muscle tone, and facilitation of volitional active controled movement  * Positioning to improve skin integrity, interaction with environment and functional independence    Modified Jillian Scale   Score     Description  0             No symptoms  1             No significant disability despite symptoms  2             Slight disability; able to look after own affairs  3             Moderate disability; able to ambulate without assist/ requires assist with ADLs  4             Moderate/Severe disability;requires assist to ambulate/assist with ADLs  5             Severe disability;bedridden/incontinent   6               Score:   4    Recommended Adaptive Equipment: TBD pending progress     Home Living: Pt lives alone in a 2 story home with 2 step(s) to enter and 0 rail(s); bed/bath on 1st floor. [facility] [address] [facility] [address] [facility]: None    Prior Level of Function: IND with ADLs;  IND with IADLs. No use of AD for functional mobility.    Driving: Yes  Occupation: Caring for pet dog    Pain Level: pt c/o 0/10 pain this session    Cognition: A&O: 4/4 ; Follows 1 step commands   Memory: Good   Comprehension: Fair   Problem solving: Fair   Judgement/safety: Fair               Communication skills: WFL           Vision: WFL               Glasses: Yes                                                   Hearing: WFL     RASS: 0   CAM-ICU: (NT) Delirium     Functional Assessment:  AM-PAC Daily Activity Raw Score: 13/24   Initial Eval Status  Date: 1/24/23 Treatment Status  Date: 1/26/23 STGs = LTGs  Time frame: 7-14 days   Feeding Min A  Decreased FMC LUE Set up                     Independent       Grooming Mod A overall/Min A  Pt able to wash face with RUE seated EOB. Mod A  To comb hair standing                     Modified Grandin        UB dressing/bathing Mod A  Decreased coordination of LUE during participation of light bathing task seated EOB. Per last tx                  SBA       LB dressing/bathing Max A  To don/doff socks seated EOB with hand over hand assist of LUE for [facility]. Mod A  Per last tx                     SBA        Toileting Max A Max A  Clothing management                     SBA     Bed Mobility  Supine to sit:   Min A    Sit to supine:   NT Supine to sit:   SBA    Sit to supine:   SBA  Educated pt on technique to increase independence. Modified Grandin     Functional Transfers Sit to stand: Mod A    Stand to sit:   Mod A    Stand Pivot: Mod A Sit to stand: Mod A    Stand to sit:   Mod A    Toilet transfer: Mod A                     SBA     Functional Mobility Mod A progressing to Max A w/ fatigue - increased L lateral lean w/ use of ww. Mod A   To and from bathroom using w/w                     SBA with use of Appropriate AD        Balance Sitting:     Static:  SBA    Dynamic:Min A  Standing: Mod A w/ ww Sitting:     Static:  SBA    Dynamic:Min A  Standing:  Mod A w/ ww Sitting:     Static:  Independent Dynamic:Independent  Standing: SBA                   Endurance/Activity Tolerance   Fair tolerance with light activity. Fair+                     Good   Visual/  Perceptual Appears WFL - no c/o blurry vision at this time. Appears functional        Comments: Upon arrival, patient supine in bed. Pt educated on techniques to increase independence and safety during ADL's, bed mobility, and functional transfers. At end of session, patient left seated semi upright in bed.     Time In: 10:12           Time Out: 10:58        Total Treatment time: 46 minutes   Min Units   OT Eval Low 48823     OT Eval Medium 83622     OT Eval High 29842     OT Re-Eval [id number]     Therapeutic Ex 13017     Therapeutic Activities 78624 41 1   ADL/Self Care 07846 30 2   Orthotic Management 08389     Neuro Re-Ed 22885     Non-Billable Time        Paulina Recio

## 2023-01-26 NOTE — PROGRESS NOTES
SPEECH LANGUAGE PATHOLOGY  DAILY PROGRESS NOTE        PATIENT NAME:  Brigida Tarango      ROOM:  1356/0805-L :  1955         TODAY'S DATE:  2023       Patient seen for dysphagia x2      DYSPHAGIA  Current Diet Order: ADULT DIET; Dysphagia - Soft and Bite Sized; Mildly Thick (Nectar)    Results and recommendations of swallowing evaluation reviewed. Tolerating diet without noted or reported difficulties    Oral motor strength/ coordination exercises to improve bolus prep/ control and mastication were completed with  minimal verbal prompts .   Laryngeal strength/ ROM therapeutic exercises to improve airway protection for the least restrictive PO diet  were completed with minimal verbal prompts

## 2023-01-26 NOTE — PROGRESS NOTES
And aspirin for multiple vertebral stents    [facility]   Progress note      Subjective:    Looks well  No other acute issues or complaints  Following commands answering questions  Objective:    /64   Pulse 71   Temp 97.5 °F (36.4 °C) (Oral)   Resp 16   Ht 6' (1.829 m)   Wt 150 lb (68 kg)   SpO2 97%   BMI 20.34 kg/m²     In: 490 [P.O.:480; I.V.:10]  Out: 225   In: 490   Out: 225 [Urine:225]    General appearance: NAD, conversant and answering questions appropriately  HEENT: AT/NC, MMM  Neck: FROM, supple  Lungs: Clear to auscultation  CV: RRR, no MRGs  Vasc: Radial pulses 2+  Abdomen: Soft, non-tender; no masses or HSM  Extremities: No peripheral edema or digital cyanosis  Skin: no rash, lesions or ulcers    Recent Labs     01/23/23  0435 01/24/23  0403 01/25/23  0425   WBC 5.1 5.9 5.3   HGB 9.7* 11.6* 10.7*   HCT 27.6* 33.5* 30.4*    309 340       Recent Labs     01/23/23  0435 01/24/23  0403 01/25/23  0425    136 135   K 3.6 4.1 4.0    101 100   CO2 20* 23 23   BUN 6 10 18   CREATININE 0.6* 0.7 0.7   CALCIUM 8.2* 9.1 9.0       Assessment:    Principal Problem:    Cerebellar stroke, acute (HCC)  Active Problems:    Vertebral artery dissection (HCC)    Chronic obstructive pulmonary disease (HCC)    Sinus bradycardia    Vocal cord paralysis, bilateral partial  Resolved Problems:    * No resolved hospital problems.  *      Plan:    20-year-old male who developed slurred speech and difficulty walking presented to an outside facility was diagnosed with subacute infarct and was transferred to [address] [place] [address] MICU with     Acute ischemic cerebellar stroke  Patient agitated and confused and really not able to provide much history  MRA/CTA from [place]-thrombus left vertebral artery dissection versus left vertebral artery thromboembolism  Low-dose heparin drip-with titration parameters  1/20/2023-underwent extensive reconstruction, thrombectomy and vertebral artery stenting-please electronic medical  Radiology's procedure notes below  ASA and Brilinta, Integrilin drip has been initiated   high intensity statin, blood pressure control  Swallow study once patient able to tolerate  Echocardiogram - pending  Ultrasound bilateral lower extremity-rule out DVT  IV hydration  Smoking cessation  Neurology and neuro interventional radiology following     Bradycardia of unknown etiology  Hold offending agents  Keep atropine at bedside  Check lipid panel  Consult EP,-await input possible pacemaker placement    1/21/2023  In four-point restraints today-soft bilaterally  Agitated confused unable to provide much of the history  Echocardiogram completed with ejection fraction 60 to 65%-no evidence of shunt or PFO  Head CT to be completed if patient can tolerate without being agitated-increase Precedex for this  Case discussed with Dr. Don Teague radiology, EP, vascular, critical care team all on board  Consider phenobarbital for severe alcohol withdrawal symptoms use  For now he is okay on Precedex  Remains on off Cardene, Integrilin, drips  Continue dual antiplatelet therapy with Brilinta and aspirin  Medicine will continue to follow    1/22/2023  Out of restraints  Following commands  Continue to monitor mentation  Remains on dual antiplatelet therapy due to placement of vertebral reconstruction with stents, high intensity statin  Off Integrilin  Watch for signs of bleeding  No significant symptoms of alcohol withdrawals, if any ensue would use phenobarbital or Ativan  Okay to transfer out of ICU setting from medicine standpoint  Neurology to continue to follow along with neuro interventional radiology    1/23/2023  No acute events overnight  Remains restraint free  Following commands and answering questions appropriately  Okay for transfer out of ICU from medicine standpoint  Continue Brilinta and aspirin, high intensity statin, BP control  Monitor H&H-9.7/27 today  Initiate PT OT once out of ICU    1/24/2023  Transferring out of ICU   No acute issues, behaviorally/neurologically much improved in terms of agitation  DC plan once rehab is finalized  PT OT eval-AMPAC  score 13/24  A.m. labs  Continue DAPT-Brilinta/aspirin for stents placed and vertebral artery reconstruction, high intensity statin,   blood pressure control    1/25/2023  He is resting comfortably in no acute distress  Not requiring restraints  Denies any pain or discomfort  Continue aspirin and Brilinta for multiple vertebral stents-he has been educated on importance of compliance with his medications  Continue high intensity statin and blood pressure control  AM PAC score 13/24  Await placement issues at subacute rehab for discharge   Clinically stable   ENT now following for concern for vocal cord dysfunction-recommendations to initiate Decadron-at discretion of neurology    code Status: Full code  Consultants: Neurology, neuro interventional radiology, ICU team  DVT Prophylaxis   PT/OT  Discharge planning           Randy Contreras MD  7:53 PM  1/25/2023

## 2023-01-26 NOTE — PLAN OF CARE
Problem: Safety - Adult  Goal: Free from fall injury  1/26/2023 1300 by Ruthine Najjar, RN  Outcome: Progressing  1/25/2023 2322 by Ann-Marie Jamil RN  Outcome: Progressing

## 2023-01-26 NOTE — PROGRESS NOTES
And aspirin for multiple vertebral stents    [facility]   Progress note      Subjective:    Looks well  No other acute issues or complaints  Following commands answering questions  Objective:    BP (!) 137/90   Pulse 81   Temp 97.7 °F (36.5 °C) (Temporal)   Resp 18   Ht 6' (1.829 m)   Wt 150 lb (68 kg)   SpO2 98%   BMI 20.34 kg/m²     In: 600 [P.O.:600]  Out: 300   In: 600   Out: 300 [Urine:300]    General appearance: NAD, conversant and answering questions appropriately  HEENT: AT/NC, MMM  Neck: FROM, supple  Lungs: Clear to auscultation  CV: RRR, no MRGs  Vasc: Radial pulses 2+  Abdomen: Soft, non-tender; no masses or HSM  Extremities: No peripheral edema or digital cyanosis  Skin: no rash, lesions or ulcers    Recent Labs     01/24/23  0403 01/25/23  0425 01/26/23  0446   WBC 5.9 5.3 8.0   HGB 11.6* 10.7* 11.4*   HCT 33.5* 30.4* 32.2*    340 401       Recent Labs     01/24/23  0403 01/25/23  0425 01/26/23  0446    135 137   K 4.1 4.0 4.3    100 100   CO2 23 23 25   BUN 10 18 20   CREATININE 0.7 0.7 0.8   CALCIUM 9.1 9.0 9.0       Assessment:    Principal Problem:    Cerebellar stroke, acute (HCC)  Active Problems:    Vertebral artery dissection (HCC)    Chronic obstructive pulmonary disease (HCC)    Sinus bradycardia    Vocal cord paralysis, bilateral partial  Resolved Problems:    * No resolved hospital problems.  *      Plan:    76-year-old male who developed slurred speech and difficulty walking presented to an outside facility was diagnosed with subacute infarct and was transferred to [address] [place] [address] MICU with     Acute ischemic cerebellar stroke  Patient agitated and confused and really not able to provide much history  MRA/CTA from [place]-thrombus left vertebral artery dissection versus left vertebral artery thromboembolism  Low-dose heparin drip-with titration parameters  1/20/2023-underwent extensive reconstruction, thrombectomy and vertebral artery stenting-please electronic medical  Radiology's procedure notes below  ASA and Brilinta, Integrilin drip has been initiated   high intensity statin, blood pressure control  Swallow study once patient able to tolerate  Echocardiogram - pending  Ultrasound bilateral lower extremity-rule out DVT  IV hydration  Smoking cessation  Neurology and neuro interventional radiology following     Bradycardia of unknown etiology  Hold offending agents  Keep atropine at bedside  Check lipid panel  Consult EP,-await input possible pacemaker placement    1/21/2023  In four-point restraints today-soft bilaterally  Agitated confused unable to provide much of the history  Echocardiogram completed with ejection fraction 60 to 65%-no evidence of shunt or PFO  Head CT to be completed if patient can tolerate without being agitated-increase Precedex for this  Case discussed with Dr. Desiree Pantoja radiology, EP, vascular, critical care team all on board  Consider phenobarbital for severe alcohol withdrawal symptoms use  For now he is okay on Precedex  Remains on off Cardene, Integrilin, drips  Continue dual antiplatelet therapy with Brilinta and aspirin  Medicine will continue to follow    1/22/2023  Out of restraints  Following commands  Continue to monitor mentation  Remains on dual antiplatelet therapy due to placement of vertebral reconstruction with stents, high intensity statin  Off Integrilin  Watch for signs of bleeding  No significant symptoms of alcohol withdrawals, if any ensue would use phenobarbital or Ativan  Okay to transfer out of ICU setting from medicine standpoint  Neurology to continue to follow along with neuro interventional radiology    1/23/2023  No acute events overnight  Remains restraint free  Following commands and answering questions appropriately  Okay for transfer out of ICU from medicine standpoint  Continue Brilinta and aspirin, high intensity statin, BP control  Monitor H&H-9.7/27 today  Initiate PT OT once out of ICU    1/24/2023  Transferring out of ICU   No acute issues, behaviorally/neurologically much improved in terms of agitation  DC plan once rehab is finalized  PT OT eval-AMPAC  score 13/24  A.m. labs  Continue DAPT-Brilinta/aspirin for stents placed and vertebral artery reconstruction, high intensity statin,   blood pressure control    1/25/2023  He is resting comfortably in no acute distress  Not requiring restraints  Denies any pain or discomfort  Continue aspirin and Brilinta for multiple vertebral stents-he has been educated on importance of compliance with his medications  Continue high intensity statin and blood pressure control  AM PAC score 13/24  Await placement issues at subacute rehab for discharge   Clinically stable   ENT now following for concern for vocal cord dysfunction-recommendations to initiate Decadron-at discretion of neurology    1/26/2023  Patient is doing well in no acute distress  From a medicine standpoint he is okay for discharge  Will defer Decadron to ENT per their note to be started at discretion of neurology  He will be going to [address] [facility] [address] and labs stable from medicine standpoint for discharge, after evaluation by neurology today    code Status: Full code  Consultants: Neurology, neuro interventional radiology, ICU team  DVT Prophylaxis   PT/OT  Discharge planning           Alejandra Paulino MD  1:26 PM  1/26/2023

## 2023-01-26 NOTE — PROGRESS NOTES
Pt being discharged to facility. No difficulty breathing noted. No need for outpatient decadron. Follow up with Dr. George Fierro outpatient in 4 weeks for follow up exam and scope evaluation.     Jhonny Calderón DO,  Resident Physician, PGY-2  Department of Otolaryngology, [facility])

## 2023-01-26 NOTE — CARE COORDINATION
1/26/2023 social work transition of care planning  Pt plan is to [facility] skilled nursing,once medically stable. 7000 will need completed at discharge. No covid test needed. Facility can transport m-f, transport will need set up if discharged over the weekend. Electronically signed by HARJIT Rushing on 1/26/2023 at 8:31 AM    Addendum:Facility to transport at 5pm today to [facility]. Sw notified Nurse and pt's sister. Neurology signed off on 1/24.    Electronically signed by HARJIT Rushing on 1/26/2023 at 2:32 PM

## 2023-01-26 NOTE — PLAN OF CARE
Problem: Discharge Planning  Goal: Discharge to home or other facility with appropriate resources  Outcome: Progressing     Problem: Chronic Conditions and Co-morbidities  Goal: Patient's chronic conditions and co-morbidity symptoms are monitored and maintained or improved  Outcome: Progressing     Problem: ABCDS Injury Assessment  Goal: Absence of physical injury  Outcome: Progressing     Problem: Safety - Adult  Goal: Free from fall injury  Outcome: Progressing     Problem: Pain  Goal: Verbalizes/displays adequate comfort level or baseline comfort level  Outcome: Progressing     Problem: Skin/Tissue Integrity  Goal: Absence of new skin breakdown  Outcome: Progressing     Problem: Neurosensory - Adult  Goal: Achieves stable or improved neurological status  Outcome: Progressing  Goal: Achieves maximal functionality and self care  Outcome: Progressing     Problem: Musculoskeletal - Adult  Goal: Return mobility to safest level of function  Outcome: Progressing  Goal: Return ADL status to a safe level of function  Outcome: Progressing     Problem: Drug Abuse/Detox  Goal: Will have no detox symptoms and will verbalize plan for changing drug-related behavior  Outcome: Progressing     Problem: Self Care Deficit  Goal: Return ADL status to a safe level of function  Outcome: Progressing

## 2023-01-30 LAB
ALBUMIN SERPL-MCNC: 3.7 G/DL (ref 3.5–5.2)
ALP BLD-CCNC: 135 U/L (ref 40–129)
ALT SERPL-CCNC: 6 U/L (ref 0–40)
ANION GAP SERPL CALCULATED.3IONS-SCNC: 10 MMOL/L (ref 7–16)
AST SERPL-CCNC: 16 U/L (ref 0–39)
BASOPHILS ABSOLUTE: 0.03 E9/L (ref 0–0.2)
BASOPHILS RELATIVE PERCENT: 0.4 % (ref 0–2)
BILIRUB SERPL-MCNC: 0.4 MG/DL (ref 0–1.2)
BUN BLDV-MCNC: 10 MG/DL (ref 6–23)
CALCIUM SERPL-MCNC: 9.2 MG/DL (ref 8.6–10.2)
CHLORIDE BLD-SCNC: 102 MMOL/L (ref 98–107)
CO2: 23 MMOL/L (ref 22–29)
CREAT SERPL-MCNC: 0.6 MG/DL (ref 0.7–1.2)
EOSINOPHILS ABSOLUTE: 0.14 E9/L (ref 0.05–0.5)
EOSINOPHILS RELATIVE PERCENT: 2 % (ref 0–6)
GFR SERPL CREATININE-BSD FRML MDRD: >60 ML/MIN/1.73
GLUCOSE BLD-MCNC: 95 MG/DL (ref 74–99)
HCT VFR BLD CALC: 28.5 % (ref 37–54)
HEMOGLOBIN: 9.8 G/DL (ref 12.5–16.5)
IMMATURE GRANULOCYTES #: 0.02 E9/L
IMMATURE GRANULOCYTES %: 0.3 % (ref 0–5)
LYMPHOCYTES ABSOLUTE: 1.57 E9/L (ref 1.5–4)
LYMPHOCYTES RELATIVE PERCENT: 22.6 % (ref 20–42)
MCH RBC QN AUTO: 31.3 PG (ref 26–35)
MCHC RBC AUTO-ENTMCNC: 34.4 % (ref 32–34.5)
MCV RBC AUTO: 91.1 FL (ref 80–99.9)
MONOCYTES ABSOLUTE: 0.66 E9/L (ref 0.1–0.95)
MONOCYTES RELATIVE PERCENT: 9.5 % (ref 2–12)
NEUTROPHILS ABSOLUTE: 4.54 E9/L (ref 1.8–7.3)
NEUTROPHILS RELATIVE PERCENT: 65.2 % (ref 43–80)
PDW BLD-RTO: 13.1 FL (ref 11.5–15)
PLATELET # BLD: 451 E9/L (ref 130–450)
PMV BLD AUTO: 8.7 FL (ref 7–12)
POTASSIUM SERPL-SCNC: 4.3 MMOL/L (ref 3.5–5)
RBC # BLD: 3.13 E12/L (ref 3.8–5.8)
SODIUM BLD-SCNC: 135 MMOL/L (ref 132–146)
TOTAL PROTEIN: 6.4 G/DL (ref 6.4–8.3)
WBC # BLD: 7 E9/L (ref 4.5–11.5)

## 2023-01-31 PROCEDURE — 93886 INTRACRANIAL COMPLETE STUDY: CPT | Performed by: PSYCHIATRY & NEUROLOGY

## 2023-02-01 NOTE — DISCHARGE SUMMARY
[facility]   Discharge summary   Patient ID:  Feliciano Corbett  [id number]  78 y.o.  1955    Admit date: 1/20/2023    Discharge date and time: 1/26/2023    Admission Diagnoses:   Patient Active Problem List   Diagnosis    Cerebellar stroke, acute ([facility] [address]    Vertebral artery dissection (HCC)    Chronic obstructive pulmonary disease (HCC)    Sinus bradycardia    Vocal cord paralysis, bilateral partial       Discharge Diagnoses: CVA    Consults: otolaryngology    Procedures: None    Hospital Course:  The patient is a 78 y.o. male of Honorio Paredes MD     55-year-old male who developed slurred speech and difficulty walking presented to an outside facility was diagnosed with subacute infarct and was transferred to [address] [place] [address] MICU with     Acute ischemic cerebellar stroke  Patient agitated and confused and really not able to provide much history  MRA/CTA from [place]-thrombus left vertebral artery dissection versus left vertebral artery thromboembolism  Low-dose heparin drip-with titration parameters  1/20/2023-underwent extensive reconstruction, thrombectomy and vertebral artery stenting-please electronic medical  Radiology's procedure notes below  ASA and Brilinta, Integrilin drip has been initiated   high intensity statin, blood pressure control  Swallow study once patient able to tolerate  Echocardiogram - pending  Ultrasound bilateral lower extremity-rule out DVT  IV hydration  Smoking cessation  Neurology and neuro interventional radiology following     Bradycardia of unknown etiology  Hold offending agents  Keep atropine at bedside  Check lipid panel  Consult EP,-await input possible pacemaker placement     1/21/2023  In four-point restraints today-soft bilaterally  Agitated confused unable to provide much of the history  Echocardiogram completed with ejection fraction 60 to 65%-no evidence of shunt or PFO  Head CT to be completed if patient can tolerate without being agitated-increase Precedex for this  Case discussed with Dr. Sung Celestin radiology, EP, vascular, critical care team all on board  Consider phenobarbital for severe alcohol withdrawal symptoms use  For now he is okay on Precedex  Remains on off Cardene, Integrilin, drips  Continue dual antiplatelet therapy with Brilinta and aspirin  Medicine will continue to follow     1/22/2023  Out of restraints  Following commands  Continue to monitor mentation  Remains on dual antiplatelet therapy due to placement of vertebral reconstruction with stents, high intensity statin  Off Integrilin  Watch for signs of bleeding  No significant symptoms of alcohol withdrawals, if any ensue would use phenobarbital or Ativan  Okay to transfer out of ICU setting from medicine standpoint  Neurology to continue to follow along with neuro interventional radiology     1/23/2023  No acute events overnight  Remains restraint free  Following commands and answering questions appropriately  Okay for transfer out of ICU from medicine standpoint  Continue Brilinta and aspirin, high intensity statin, BP control  Monitor H&H-9.7/27 today  Initiate PT OT once out of ICU     1/24/2023  Transferring out of ICU   No acute issues, behaviorally/neurologically much improved in terms of agitation  DC plan once rehab is finalized  PT OT eval-AMPAC  score 13/24  A.m. labs  Continue DAPT-Brilinta/aspirin for stents placed and vertebral artery reconstruction, high intensity statin,   blood pressure control     1/25/2023  He is resting comfortably in no acute distress  Not requiring restraints  Denies any pain or discomfort  Continue aspirin and Brilinta for multiple vertebral stents-he has been educated on importance of compliance with his medications  Continue high intensity statin and blood pressure control  AM PAC score 13/24  Await placement issues at subacute rehab for discharge   Clinically stable   ENT now following for concern for vocal cord dysfunction-recommendations to initiate Decadron-at discretion of neurology     1/26/2023  Patient is doing well in no acute distress  From a medicine standpoint he is okay for discharge  Will defer Decadron to ENT per their note to be started at discretion of neurology  He will be going to [address] and labs stable from medicine standpoint for discharge, after evaluation by neurology today       Recent Labs     01/30/23  0535   WBC 7.0   HGB 9.8*   HCT 28.5*   *       Recent Labs     01/30/23  0535      K 4.3      CO2 23   BUN 10   CREATININE 0.6*   CALCIUM 9.2       CT head without contrast    Result Date: 1/24/2023  EXAMINATION: CT OF THE HEAD WITHOUT CONTRAST  1/21/2023 12:48 am TECHNIQUE: CT of the head was performed without the administration of intravenous contrast.  Dual energy CT was also performed. Automated exposure control, iterative reconstruction, and/or weight based adjustment of the mA/kV was utilized to reduce the radiation dose to as low as reasonably achievable. COMPARISON: The previous CT scan of the brain performed 01/20/2023 and prior MRI of the brain performed 01/20/2023. HISTORY: ORDERING SYSTEM PROVIDED HISTORY: follow up ischemic stroke, r/o hemorrhagic conversation TECHNOLOGIST PROVIDED HISTORY: Has a \"code stroke\" or \"stroke alert\" been called? ->No Reason for exam:->follow up ischemic stroke, r/o hemorrhagic conversation What reading provider will be dictating this exam?->CRC FINDINGS: BRAIN/VENTRICLES: Again identified is the large left cerebellar infarct, which is without change in size and configuration. Again seen is the lacunar infarct in the right cerebellum. There is no evidence of midline shift or gross mass. There is a new, ill-defined and vague area of decreased attenuation within the right occipital lobe, with effacement of sulci, indicating an involving, acute infarct. No hemorrhage or contusion is appreciated.   Mild, diffuse, cerebral atrophy is again noted. The ventricular system is unremarkable. There has been interval placement of a left vertebral artery stent. Calcifications are again seen involving the cavernous carotid arteries. ORBITS: The visualized portion of the orbits demonstrate no acute abnormality. There is again deviation of the globes to the left. SINUSES: There has been no significant interval change in the ethmoid and right sphenoid sinus disease. The mastoid air cells remain clear. SOFT TISSUES/SKULL:  No acute abnormality of the visualized skull or soft tissues. 1.  Evolving, acute infarct involving the right occipital lobe. 2.  No significant interval change in the large left cerebellar infarct, when compared to the previous study performed 01/20/2023. 3.  Lacunar infarct again seen involving the right cerebellum. 4.  Mild, diffuse, cerebral atrophy again appreciated. 5. Interval placement of a left vertebral artery stent. 6.  Other findings, as described above. CT HEAD WO CONTRAST    Result Date: 1/21/2023  EXAMINATION: CT OF THE HEAD WITHOUT CONTRAST  1/21/2023 12:49 am TECHNIQUE: CT of the head was performed without the administration of intravenous contrast. Automated exposure control, iterative reconstruction, and/or weight based adjustment of the mA/kV was utilized to reduce the radiation dose to as low as reasonably achievable. COMPARISON: 01/21/2023 HISTORY: ORDERING SYSTEM PROVIDED HISTORY: post vertebral artery thrombectomy and stenting TECHNOLOGIST PROVIDED HISTORY: To be performed 24 hours after first CT Head. Reason for exam:->post vertebral artery thrombectomy and stenting Has a \"code stroke\" or \"stroke alert\" been called? ->No What reading provider will be dictating this exam?->CRC FINDINGS: BRAIN/VENTRICLES: There is a stable zone of encephalomalacia in the left cerebellum along the posteroinferior cerebellar artery distribution.   There is slight effacement of the sulci suggest underlying cerebral edema but no midline shift is observed. There is an evolving area of encephalomalacia involving the right posterior cerebral artery distribution with there is increased low density along the posterior inferior aspect of the right occipital lobe. The gray-white differentiation is maintained without evidence of an acute infarct. There is no evidence of hydrocephalus. ORBITS: The visualized portion of the orbits demonstrate no acute abnormality. SINUSES: The visualized paranasal sinuses reveals opacification of the right sphenoid sinus is well as multiple ethmoid air cells suggests chronic sinusitis. The mastoid air cells demonstrate no acute abnormality. SOFT TISSUES/SKULL:  No acute abnormality of the visualized skull or soft tissues. 1. Evolving infarct involving the right posterior cerebral artery distribution. 2. Stable infarct involving the left posteroinferior cerebellar artery distribution. .     CT HEAD WO CONTRAST    Result Date: 1/20/2023  EXAMINATION: CT OF THE HEAD WITHOUT CONTRAST  1/20/2023 12:42 am TECHNIQUE: CT of the head was performed without the administration of intravenous contrast. Automated exposure control, iterative reconstruction, and/or weight based adjustment of the mA/kV was utilized to reduce the radiation dose to as low as reasonably achievable. COMPARISON: None. HISTORY: ORDERING SYSTEM PROVIDED HISTORY: follow up ischemic stroke, AMS TECHNOLOGIST PROVIDED HISTORY: Reason for exam:->follow up ischemic stroke, AMS Has a \"code stroke\" or \"stroke alert\" been called? ->Yes What reading provider will be dictating this exam?->CRC FINDINGS: BRAIN/VENTRICLES: There is abnormal amorphous low attenuation obscuring architecture of the left cerebellar hemisphere. The features have high association with acute infarction associated with the posterior inferior cerebellar artery. There is cerebral atrophy with associated ex vacuo dilatation of the ventricular system.   There is mild ill-defined low-attenuation in the periventricular white matter, corona radiata, and centrum semiovale bilaterally which has association with age related microvascular white matter ischemic disease. There is mild atherosclerotic plaque in the bilateral carotid canal, carotid siphon, and cavernous internal carotid artery (<50%). There is no acute intracranial hemorrhage, mass effect, or midline shift. No abnormal extra-axial fluid collection. There is no evidence of hydrocephalus. ORBITS: The visualized portion of the orbits demonstrate no acute abnormality. SINUSES: There is mild polypoid perimucosal thickening in the right sphenoid sinus and posterior left-sided ethmoid air cells. The visualized mastoid air cells demonstrate no acute abnormality. SOFT TISSUES/SKULL:  No acute abnormality of the visualized skull or soft tissues. Subacute infarction localized in the inferior left cerebellar hemisphere in the PICA distribution. No hemorrhage. Chronic parenchymal change including atrophy and old white matter ischemia. IR TRANSCATH PLACE STENT UPPER W PTA INIT ARTERY    Result Date: 1/21/2023  IR GUIDED PERC TRANSLUM NONCOR ART THROMBECTOMY INIT, IR INTRACRANIAL STENT(S), IR TRANSCATH PLACE STENT UPPER W PTA INIT ARTERY PROCEDURE PERFORMED: 1. Cerebral angiogram with mechanical thrombectomy of the left extracranial and intracranial portions of the vertebral artery 2. Stent assisted complete reconstruction of the Left  V1 and V2 portions of the extracranial vertebral artery WITH distal embolic protection 3. Recurrent reocclusion of the intracranial vertebral artery status post repeat thrombectomy 4. Artery to artery emboli into the left posterior cerebral artery and the right posterior cerebral artery status post aspiration thrombectomy of the right PCA and the left PCA 5. Successful intracranial stenting ( MULTILINK VISION) of the V4 portion of the left vertebral artery to prevent recurrent occlusion 6. Intraoperative Ame CT Limited CT with interpretation COMPLETED DATE:  1/20/2023 5:00 PM CLINICAL HISTORY/PRE-PROCEDURE DIAGNOSIS: Acute ischemic stroke. Onset of stroke was on January 17. Patient has having recurrent posterior's circulation stroke despite maximal medical therapy. Patient was then transferred from outside hospital to here. Medical therapy maximized with heparin drip. Continues to have more cranial nerve deficits. Procedure was offered on a humanitarian basis as he is still having strokes on heparin drip POST-PROCEDURE DIAGNOSIS: Successful mechanical thrombectomy successful stent assisted reconstruction/recanalization of the left vertebral artery connecting it to the basilar artery/restoration of flow COMPARISON: CT angiographic documentation done at outside hospital images were transferred into PACS. Data imaging 1/19/2023 MRI 1/20/2023 ANESTHESIA: Conscious sedation and local anesthesia VESSELS CATHETERIZED: 1. Left vertebral artery. 2. Basilar artery. 3. Left posterior cerebral artery. 4 right posterior cerebral artery. 6. Left subclavian artery. RADIATION EXPOSURE DATA:  1177 MG Y TOTAL FLUOROSCOPY TIME: 29 minutes and 36 seconds CONTRAST USED: 125 mL of Isovue-300 PROCEDURE: Following informed consent, the patient was brought to the angiography suite, both groins are prepped and draped in usual sterile manner. Vascular access was obtained in the right common femoral artery with a 8-Faroese sheath which was connected to continuous heparinized saline flush. A 8-Faroese cerebase catheter was prepped and with the support of a diagnostic catheter was brought into the aorta, double flushed and connected to continuous heparinized saline flush. The  guide catheter was then telescoped into the left subclavian artery. Fluoroscopic imaging performed at that time confirmed the presence of previously documented arterial occlusion.  Multilevel partial thrombosis all throughout the left vertebral artery severe stenosis at the origin of the left vertebral artery/vertebral subclavian junction. Following this aspiration was initially performed through the cerebral base catheter through the Zoom suction device and then gradually a Zoom 71 was taken up over a glide advantage wire exchange length and continuous aspiration was performed wall pushing the aspiration catheter forward into the left vertebral artery. Large amount of thrombus was identified. Following this imaging was performed which showed intracranial V4 occlusion of the left vertebral artery and hence using Zoom 35 system through the Zoom 71 system serial aspiration was performed successful thrombectomy was performed until the midportion of the basilar artery. No distal embolization was noticed. On repeat imaging through the proximal vertebral artery there was really occlusion noticed in the V2 segment and hence it was decided to proceed with the stent assisted reconstruction of the entire vessel. A 5 mm spider device was then deployed into the V3 segment of the vertebral artery Patient was started on Integrilin. Initially 1 carotid Wallstent was placed in the distal portionv2 and there is was overlap with the second portion. Again in the V1 portion and initial carotid stent was placed and in the subclavian vertebral junction there is severe stenosis and hence of fourth carotid wall stent was also placed here. After a total of 4 stents were deployed in the V1 and V2 segments angiographic documentation was repeated which showed distal occlusion in the V4 segment and hence a repeat thrombectomy was performed in the V4 segment. This resulted in artery to artery embolization into bilateral posterior cerebral arteries and the basilar tip. Aspiration thrombectomy was performed initially in the left posterior cerebral artery and was successful. Aspiration thrombectomy was then repeated in the right posterior cerebral artery multiple attempts.  Following this it was decided to stent the V4 segment that keeps reoccluding and hence balloon mounted stent  3.5mm x 18 mm Multilink vision was taken over and deployed over a 014 wire that was taken into the left vertebral artery and internal taken down into the right vertebral artery for appropriate support to deployed without a intermediate catheter. The balloon mounted stent was successfully deployed at 8 claude. Angiographic documentation repeated with contrast reveals good flow throughout the left vertebral artery this was repeated again at 3 minute interval and was persistently open. Intraoperative Ame CT was obtained which was negative for any stephanie obvious intracranial hemorrhage. Patient will be maintained on Integrilin drip. Anesthesia was reversed and patient was transferred to the postoperative recovery unit in a stable condition. STROKE ACUTE TIME STAMPS: Please see nursing documentation epic INTERPRETATION OF IMAGES: 1. Left  vertebral artery injections: The common and internal carotid arteries had partial thrombosis of the V1 and V2 segment. Complete thrombosis of the V3 segment complete thrombosis of the V4 segment with no flow contrast leading to the basilar artery. 2. Angiographic documentation post carotid Wallstent 6 mm x 22 mm deployment: Stent 1. Was deployed in the distal V2 portion of the vertebral artery post deployment no evidence of in-stent stenosis or thrombosis. 3. Angiographic documentation post carotid Wallstent 6 mm x 22 mm deployment: Stent was deployed in the mid V2 portion angiogram reveals patency of the stent 4. Angiographic documentation post carotid Wallstent 6 mm x 22 mm deployment: Stent was deployed in the proximal V2 to V1 portion. Repeat angiogram reveals that the first and second stent have occluded. 5.Angiographic documentation post carotid Wallstent 6 mm x 22 mm deployment: Stent was deployed from the V4 segment to the subclavian artery. Persistent reocclusion.  6. Angiogram post thrombectomy of the V1 V2 V3 segment successful recanalization however the distal V4 portion seems to have reoccluded. 7. Angiogram post thrombectomy of the intracranial vertebral artery: There appears to be distal embolization into the PCA. 8. Angiogram post thrombectomy of bilateral posterior cerebral arteries: Post thrombectomy images reveal satisfactory recanalization of bilateral posterior cerebral arteries 9. Angiogram post intracranial stenting with Multi-Link stent: Multi-Link stent is placed in the V4 segment no evidence of any in-stent stenosis or thrombosis. 10.Postprocedure left vertebral artery artery injections: Postprocedure all the stents are patent. Restoration of flow to the posterior circulation. No evidence of restenosis artery thrombosis. TICI 3 Intraoperative Ame CT Limited CT with interpretation: Intraoperative Ame CT Limited CT with interpretation was performed using 3-D rotational technique. Axial coronal and sagittal images were reconstructed. There were reviewed and did not show any evidence of intracranial hemorrhage. IMPRESSION 1. 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 Partial from doses of the V1 and V2 segments suspicious for vessel trauma. 2. Successful thrombectomy followed by reocclusion of the left vertebral artery both extracranial and intracranial segments. 3. Complete stent assisted reconstruction of the extracranial left vertebral artery with distal embolic protection. 4. Left posterior cerebral artery and right posterior cerebral artery thrombectomy as a result from artery to artery emboli 5. Successful rescue mechanical intracranial stenting of the V4 segment to prevent reocclusion Patients: If you have questions regarding some of the verbiage in your report, please visit [email or web address] for a definition. If you have any other questions, please contact your physician.      IR INTRACRANIAL STENT(S)    Result Date: 1/21/2023  IR GUIDED PERC TRANSLUM NONCOR ART THROMBECTOMY INIT, IR INTRACRANIAL STENT(S), IR TRANSCATH PLACE STENT UPPER W PTA INIT ARTERY PROCEDURE PERFORMED: 1. Cerebral angiogram with mechanical thrombectomy of the left extracranial and intracranial portions of the vertebral artery 2. Stent assisted complete reconstruction of the Left  V1 and V2 portions of the extracranial vertebral artery WITH distal embolic protection 3. Recurrent reocclusion of the intracranial vertebral artery status post repeat thrombectomy 4. Artery to artery emboli into the left posterior cerebral artery and the right posterior cerebral artery status post aspiration thrombectomy of the right PCA and the left PCA 5. Successful intracranial stenting ( SNSplus VISION) of the V4 portion of the left vertebral artery to prevent recurrent occlusion 6. Intraoperative Ame CT Limited CT with interpretation COMPLETED DATE:  1/20/2023 5:00 PM CLINICAL HISTORY/PRE-PROCEDURE DIAGNOSIS: Acute ischemic stroke. Onset of stroke was on January 17. Patient has having recurrent posterior's circulation stroke despite maximal medical therapy. Patient was then transferred from outside hospital to here. Medical therapy maximized with heparin drip. Continues to have more cranial nerve deficits. Procedure was offered on a humanitarian basis as he is still having strokes on heparin drip POST-PROCEDURE DIAGNOSIS: Successful mechanical thrombectomy successful stent assisted reconstruction/recanalization of the left vertebral artery connecting it to the basilar artery/restoration of flow COMPARISON: CT angiographic documentation done at outside hospital images were transferred into PACS. Data imaging 1/19/2023 MRI 1/20/2023 ANESTHESIA: Conscious sedation and local anesthesia VESSELS CATHETERIZED: 1. Left vertebral artery. 2. Basilar artery. 3. Left posterior cerebral artery. 4 right posterior cerebral artery. 6. Left subclavian artery.  RADIATION EXPOSURE DATA:  1177 MG Y TOTAL FLUOROSCOPY TIME: 29 minutes and 36 seconds CONTRAST USED: 125 mL of Isovue-300 PROCEDURE: Following informed consent, the patient was brought to the angiography suite, both groins are prepped and draped in usual sterile manner. Vascular access was obtained in the right common femoral artery with a 8-Setswana sheath which was connected to continuous heparinized saline flush. A 8-Setswana cerebase catheter was prepped and with the support of a diagnostic catheter was brought into the aorta, double flushed and connected to continuous heparinized saline flush. The  guide catheter was then telescoped into the left subclavian artery. Fluoroscopic imaging performed at that time confirmed the presence of previously documented arterial occlusion. Multilevel partial thrombosis all throughout the left vertebral artery severe stenosis at the origin of the left vertebral artery/vertebral subclavian junction. Following this aspiration was initially performed through the cerebral base catheter through the Zoom suction device and then gradually a Zoom 71 was taken up over a glide advantage wire exchange length and continuous aspiration was performed wall pushing the aspiration catheter forward into the left vertebral artery. Large amount of thrombus was identified. Following this imaging was performed which showed intracranial V4 occlusion of the left vertebral artery and hence using Zoom 35 system through the Zoom 71 system serial aspiration was performed successful thrombectomy was performed until the midportion of the basilar artery. No distal embolization was noticed. On repeat imaging through the proximal vertebral artery there was really occlusion noticed in the V2 segment and hence it was decided to proceed with the stent assisted reconstruction of the entire vessel. A 5 mm spider device was then deployed into the V3 segment of the vertebral artery Patient was started on Integrilin.  Initially 1 carotid Wallstent was placed in the distal portionv2 and there is was overlap with the second portion. Again in the V1 portion and initial carotid stent was placed and in the subclavian vertebral junction there is severe stenosis and hence of fourth carotid wall stent was also placed here. After a total of 4 stents were deployed in the V1 and V2 segments angiographic documentation was repeated which showed distal occlusion in the V4 segment and hence a repeat thrombectomy was performed in the V4 segment. This resulted in artery to artery embolization into bilateral posterior cerebral arteries and the basilar tip. Aspiration thrombectomy was performed initially in the left posterior cerebral artery and was successful. Aspiration thrombectomy was then repeated in the right posterior cerebral artery multiple attempts. Following this it was decided to stent the V4 segment that keeps reoccluding and hence balloon mounted stent  3.5mm x 18 mm Multilink vision was taken over and deployed over a 014 wire that was taken into the left vertebral artery and internal taken down into the right vertebral artery for appropriate support to deployed without a intermediate catheter. The balloon mounted stent was successfully deployed at 8 claude. Angiographic documentation repeated with contrast reveals good flow throughout the left vertebral artery this was repeated again at 3 minute interval and was persistently open. Intraoperative Ame CT was obtained which was negative for any stephanie obvious intracranial hemorrhage. Patient will be maintained on Integrilin drip. Anesthesia was reversed and patient was transferred to the postoperative recovery unit in a stable condition. STROKE ACUTE TIME STAMPS: Please see nursing documentation epic INTERPRETATION OF IMAGES: 1. Left  vertebral artery injections: The common and internal carotid arteries had partial thrombosis of the V1 and V2 segment.  Complete thrombosis of the V3 segment complete thrombosis of the V4 segment with no flow contrast leading to the basilar artery. 2. Angiographic documentation post carotid Wallstent 6 mm x 22 mm deployment: Stent 1. Was deployed in the distal V2 portion of the vertebral artery post deployment no evidence of in-stent stenosis or thrombosis. 3. Angiographic documentation post carotid Wallstent 6 mm x 22 mm deployment: Stent was deployed in the mid V2 portion angiogram reveals patency of the stent 4. Angiographic documentation post carotid Wallstent 6 mm x 22 mm deployment: Stent was deployed in the proximal V2 to V1 portion. Repeat angiogram reveals that the first and second stent have occluded. 5.Angiographic documentation post carotid Wallstent 6 mm x 22 mm deployment: Stent was deployed from the V4 segment to the subclavian artery. Persistent reocclusion. 6. Angiogram post thrombectomy of the V1 V2 V3 segment successful recanalization however the distal V4 portion seems to have reoccluded. 7. Angiogram post thrombectomy of the intracranial vertebral artery: There appears to be distal embolization into the PCA. 8. Angiogram post thrombectomy of bilateral posterior cerebral arteries: Post thrombectomy images reveal satisfactory recanalization of bilateral posterior cerebral arteries 9. Angiogram post intracranial stenting with Multi-Link stent: Multi-Link stent is placed in the V4 segment no evidence of any in-stent stenosis or thrombosis. 10.Postprocedure left vertebral artery artery injections: Postprocedure all the stents are patent. Restoration of flow to the posterior circulation. No evidence of restenosis artery thrombosis. TICI 3 Intraoperative Ame CT Limited CT with interpretation: Intraoperative Ame CT Limited CT with interpretation was performed using 3-D rotational technique. Axial coronal and sagittal images were reconstructed. There were reviewed and did not show any evidence of intracranial hemorrhage. IMPRESSION 1. 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  Partial from doses of the V1 and V2 segments suspicious for vessel trauma. 2. Successful thrombectomy followed by reocclusion of the left vertebral artery both extracranial and intracranial segments. 3. Complete stent assisted reconstruction of the extracranial left vertebral artery with distal embolic protection. 4. Left posterior cerebral artery and right posterior cerebral artery thrombectomy as a result from artery to artery emboli 5. Successful rescue mechanical intracranial stenting of the V4 segment to prevent reocclusion Patients: If you have questions regarding some of the verbiage in your report, please visit [email or web address] for a definition. If you have any other questions, please contact your physician. MRI BRAIN WO CONTRAST    Result Date: 1/20/2023  EXAMINATION: MRI OF THE BRAIN WITHOUT CONTRAST  1/20/2023 11:26 am TECHNIQUE: Multiplanar multisequence MRI of the brain was performed without the administration of intravenous contrast. COMPARISON: Head CT dated 01/20/2023 and outside MRI brain dated 01/19/2023 HISTORY: ORDERING SYSTEM PROVIDED HISTORY: stroke protocol TECHNOLOGIST PROVIDED HISTORY: Reason for exam:->stroke protocol What is the sedation requirement?->None What reading provider will be dictating this exam?->CRC FINDINGS: INTRACRANIAL STRUCTURES/VENTRICLES: There is a large area of restricted diffusion in the inferior left cerebellum, as well as the left dorsal medulla, consistent with acute/subacute infarct, primarily in the left posteroinferior cerebellar artery territory. Small area of restricted diffusion again seen in the right cerebellum on series 2, image 11, consistent with acute/subacute infarct. No new area of restricted diffusion is seen. There is associated cytotoxic edema in the left inferior cerebellum with slight effacement of the overlying cortical sulci. No other area of mass effect or midline shift. Tiny focus of low signal is seen in the right ezekiel on gradient echo series 5, image 11. This was present on the prior study from 01/19/2023.  It is of indeterminate age but could represent old petechial hemorrhage.  Otherwise, no evidence of an acute intracranial hemorrhage.  The ventricles and sulci are normal in size and configuration.  The sellar/suprasellar regions appear unremarkable.  The normal signal voids within the major intracranial vessels appear maintained. ORBITS: The visualized portion of the orbits demonstrate no acute abnormality. SINUSES: The visualized paranasal sinuses and mastoid air cells demonstrate no acute abnormality.  There is again severe mucosal thickening in the right sphenoid sinus which is almost completely opacified and moderate mucosal thickening in the left ethmoid sinus as well as mild thickening in other sinuses. BONES/SOFT TISSUES: The bone marrow signal intensity appears normal. The soft tissues demonstrate no acute abnormality.     1. Bilateral acute/subacute infarcts including left PICA infarct and small lacunar infarct in the right cerebellum, similar compared to the prior study. 2. Probable area of old petechial hemorrhage in the right lateral ezekiel. 3. Otherwise, no new acute intracranial abnormality seen. 4. Chronic sinus disease.     IR GUIDED PERC TRANSLUM ART THROMBECTOMY INIT    Result Date: 1/21/2023  IR GUIDED PERC TRANSLUM NONCOR ART THROMBECTOMY INIT, IR INTRACRANIAL STENT(S), IR TRANSCATH PLACE STENT UPPER W PTA INIT ARTERY PROCEDURE PERFORMED: 1.Cerebral angiogram with mechanical thrombectomy of the left extracranial and intracranial portions of the vertebral artery 2.Stent assisted complete reconstruction of the Left  V1 and V2 portions of the extracranial vertebral artery WITH distal embolic protection 3.Recurrent reocclusion of the intracranial vertebral artery status post repeat thrombectomy 4.Artery to artery emboli into the left posterior cerebral artery and the right posterior cerebral artery status post aspiration thrombectomy of the right  PCA and the left PCA 5. Successful intracranial stenting ( [id number] VISION) of the V4 portion of the left vertebral artery to prevent recurrent occlusion 6. Intraoperative Ame CT Limited CT with interpretation COMPLETED DATE:  1/20/2023 5:00 PM CLINICAL HISTORY/PRE-PROCEDURE DIAGNOSIS: Acute ischemic stroke. Onset of stroke was on January 17. Patient has having recurrent posterior's circulation stroke despite maximal medical therapy. Patient was then transferred from outside hospital to here. Medical therapy maximized with heparin drip. Continues to have more cranial nerve deficits. Procedure was offered on a humanitarian basis as he is still having strokes on heparin drip POST-PROCEDURE DIAGNOSIS: Successful mechanical thrombectomy successful stent assisted reconstruction/recanalization of the left vertebral artery connecting it to the basilar artery/restoration of flow COMPARISON: CT angiographic documentation done at outside hospital images were transferred into PACS. Data imaging 1/19/2023 MRI 1/20/2023 ANESTHESIA: Conscious sedation and local anesthesia VESSELS CATHETERIZED: 1. Left vertebral artery. 2. Basilar artery. 3. Left posterior cerebral artery. 4 right posterior cerebral artery. 6. Left subclavian artery. RADIATION EXPOSURE DATA:  1177 MG Y TOTAL FLUOROSCOPY TIME: 29 minutes and 36 seconds CONTRAST USED: 125 mL of Isovue-300 PROCEDURE: Following informed consent, the patient was brought to the angiography suite, both groins are prepped and draped in usual sterile manner. Vascular access was obtained in the right common femoral artery with a 8-German sheath which was connected to continuous heparinized saline flush. A 8-German cerebase catheter was prepped and with the support of a diagnostic catheter was brought into the aorta, double flushed and connected to continuous heparinized saline flush. The  guide catheter was then telescoped into the left subclavian artery.  Fluoroscopic imaging performed at that time confirmed the presence of previously documented arterial occlusion. Multilevel partial thrombosis all throughout the left vertebral artery severe stenosis at the origin of the left vertebral artery/vertebral subclavian junction. Following this aspiration was initially performed through the cerebral base catheter through the Zoom suction device and then gradually a Zoom 71 was taken up over a glide advantage wire exchange length and continuous aspiration was performed wall pushing the aspiration catheter forward into the left vertebral artery. Large amount of thrombus was identified. Following this imaging was performed which showed intracranial V4 occlusion of the left vertebral artery and hence using Zoom 35 system through the Zoom 71 system serial aspiration was performed successful thrombectomy was performed until the midportion of the basilar artery. No distal embolization was noticed. On repeat imaging through the proximal vertebral artery there was really occlusion noticed in the V2 segment and hence it was decided to proceed with the stent assisted reconstruction of the entire vessel. A 5 mm spider device was then deployed into the V3 segment of the vertebral artery Patient was started on Integrilin. Initially 1 carotid Wallstent was placed in the distal portionv2 and there is was overlap with the second portion. Again in the V1 portion and initial carotid stent was placed and in the subclavian vertebral junction there is severe stenosis and hence of fourth carotid wall stent was also placed here. After a total of 4 stents were deployed in the V1 and V2 segments angiographic documentation was repeated which showed distal occlusion in the V4 segment and hence a repeat thrombectomy was performed in the V4 segment. This resulted in artery to artery embolization into bilateral posterior cerebral arteries and the basilar tip.  Aspiration thrombectomy was performed initially in the left posterior cerebral artery and was successful. Aspiration thrombectomy was then repeated in the right posterior cerebral artery multiple attempts. Following this it was decided to stent the V4 segment that keeps reoccluding and hence balloon mounted stent  3.5mm x 18 mm Multilink vision was taken over and deployed over a 014 wire that was taken into the left vertebral artery and internal taken down into the right vertebral artery for appropriate support to deployed without a intermediate catheter. The balloon mounted stent was successfully deployed at 8 claude. Angiographic documentation repeated with contrast reveals good flow throughout the left vertebral artery this was repeated again at 3 minute interval and was persistently open. Intraoperative Ame CT was obtained which was negative for any stephanie obvious intracranial hemorrhage. Patient will be maintained on Integrilin drip. Anesthesia was reversed and patient was transferred to the postoperative recovery unit in a stable condition. STROKE ACUTE TIME STAMPS: Please see nursing documentation epic INTERPRETATION OF IMAGES: 1. Left  vertebral artery injections: The common and internal carotid arteries had partial thrombosis of the V1 and V2 segment. Complete thrombosis of the V3 segment complete thrombosis of the V4 segment with no flow contrast leading to the basilar artery. 2. Angiographic documentation post carotid Wallstent 6 mm x 22 mm deployment: Stent 1. Was deployed in the distal V2 portion of the vertebral artery post deployment no evidence of in-stent stenosis or thrombosis. 3. Angiographic documentation post carotid Wallstent 6 mm x 22 mm deployment: Stent was deployed in the mid V2 portion angiogram reveals patency of the stent 4. Angiographic documentation post carotid Wallstent 6 mm x 22 mm deployment: Stent was deployed in the proximal V2 to V1 portion. Repeat angiogram reveals that the first and second stent have occluded.  5.Angiographic documentation post carotid Wallstent 6 mm x 22 mm deployment: Stent was deployed from the V4 segment to the subclavian artery. Persistent reocclusion. 6. Angiogram post thrombectomy of the V1 V2 V3 segment successful recanalization however the distal V4 portion seems to have reoccluded. 7. Angiogram post thrombectomy of the intracranial vertebral artery: There appears to be distal embolization into the PCA. 8. Angiogram post thrombectomy of bilateral posterior cerebral arteries: Post thrombectomy images reveal satisfactory recanalization of bilateral posterior cerebral arteries 9. Angiogram post intracranial stenting with Multi-Link stent: Multi-Link stent is placed in the V4 segment no evidence of any in-stent stenosis or thrombosis. 10.Postprocedure left vertebral artery artery injections: Postprocedure all the stents are patent. Restoration of flow to the posterior circulation. No evidence of restenosis artery thrombosis. TICI 3 Intraoperative Ame CT Limited CT with interpretation: Intraoperative Ame CT Limited CT with interpretation was performed using 3-D rotational technique. Axial coronal and sagittal images were reconstructed. There were reviewed and did not show any evidence of intracranial hemorrhage. IMPRESSION 1. 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 Partial from doses of the V1 and V2 segments suspicious for vessel trauma. 2. Successful thrombectomy followed by reocclusion of the left vertebral artery both extracranial and intracranial segments. 3. Complete stent assisted reconstruction of the extracranial left vertebral artery with distal embolic protection. 4. Left posterior cerebral artery and right posterior cerebral artery thrombectomy as a result from artery to artery emboli 5. Successful rescue mechanical intracranial stenting of the V4 segment to prevent reocclusion Patients: If you have questions regarding some of the verbiage in your report, please visit [email or web address] for a definition. If you have any other questions, please contact your physician. US DUP LOWER EXTREMITIES BILATERAL VENOUS    Result Date: 1/21/2023  EXAMINATION: DUPLEX VENOUS ULTRASOUND OF THE BILATERAL LOWER EXTREMITIES1/21/2023 1:54 pm TECHNIQUE: Duplex ultrasound using B-mode/gray scaled imaging, Doppler spectral analysis and color flow Doppler was obtained of the deep venous structures of the lower bilateral extremities. COMPARISON: None. HISTORY: ORDERING SYSTEM PROVIDED HISTORY: r/o DVT TECHNOLOGIST PROVIDED HISTORY: Reason for exam:->r/o DVT What reading provider will be dictating this exam?->CRC FINDINGS: The visualized veins of the bilateral lower extremities are patent and free of echogenic thrombus. The common femoral veins and iliac veins are limited evaluation due to the presence of bandages. The veins of the lower extremity however demonstrate good compressibility with normal color flow study and spectral analysis. No evidence of DVT in either lower extremity. Discharge Exam:    HEENT: NCAT,  PERRLA, No JVD  Heart:  RRR, no murmurs, gallops, or rubs.   Lungs:  CTA bilaterally, no wheeze, rales or rhonchi  Abd: bowel sounds present, nontender, nondistended, no masses  Extrem:  No clubbing, cyanosis, or edema    Disposition: SNF     Patient Condition at Discharge: stable    Patient Instructions:      Medication List        START taking these medications      aspirin 81 MG chewable tablet  Take 1 tablet by mouth daily     atorvastatin 80 MG tablet  Commonly known as: LIPITOR  Take 1 tablet by mouth nightly     ticagrelor 90 MG Tabs tablet  Commonly known as: BRILINTA  Take 1 tablet by mouth 2 times daily               Where to Get Your Medications        These medications were sent to [facility], [address] [place] [address] Drive  [address] [facility] [address] [id number]      Phone: [phone]   aspirin 81 MG chewable tablet  atorvastatin 80 MG tablet  ticagrelor 90 MG Tabs tablet       Activity: activity as tolerated  Diet: cardiac diet and diabetic diet    Pt has been advised to: Follow-up with Teri Rader MD in 1 week.   Follow-up with consultants as recommended by them    Note that over 30 minutes was spent in preparing discharge papers, discussing discharge with patient, medication review, etc.    Signed:  Fernanda Sheets MD  1/26/2023

## 2023-02-02 ENCOUNTER — TELEPHONE (OUTPATIENT)
Dept: FAMILY MEDICINE CLINIC | Age: 68
End: 2023-02-02

## 2023-02-02 NOTE — TELEPHONE ENCOUNTER
Shiloh Herr called back regarding this.   She said that this was intended for Dr Tammie Tirado the hospitalist.

## 2023-02-02 NOTE — TELEPHONE ENCOUNTER
Spoke w/ Yanely White at [address] [place] [address] and let her know that discharge summary will need to be sent to current PCP. Yanely White said that she will take care sending to current PCP.

## 2023-03-03 ENCOUNTER — OFFICE VISIT (OUTPATIENT)
Dept: NEUROLOGY | Age: 68
End: 2023-03-03
Payer: MEDICARE

## 2023-03-03 VITALS
DIASTOLIC BLOOD PRESSURE: 84 MMHG | TEMPERATURE: 98.3 F | HEART RATE: 65 BPM | BODY MASS INDEX: 22.35 KG/M2 | OXYGEN SATURATION: 98 % | SYSTOLIC BLOOD PRESSURE: 144 MMHG | HEIGHT: 72 IN | WEIGHT: 165 LBS

## 2023-03-03 DIAGNOSIS — I77.74 VERTEBRAL ARTERY DISSECTION (HCC): ICD-10-CM

## 2023-03-03 DIAGNOSIS — I63.9 CEREBELLAR STROKE, ACUTE (HCC): Primary | ICD-10-CM

## 2023-03-03 DIAGNOSIS — Z95.820 S/P ANGIOPLASTY WITH STENT: ICD-10-CM

## 2023-03-03 PROCEDURE — 1123F ACP DISCUSS/DSCN MKR DOCD: CPT | Performed by: PSYCHIATRY & NEUROLOGY

## 2023-03-03 PROCEDURE — 99214 OFFICE O/P EST MOD 30 MIN: CPT | Performed by: PSYCHIATRY & NEUROLOGY

## 2023-03-03 RX ORDER — CLOPIDOGREL BISULFATE 75 MG/1
75 TABLET ORAL DAILY
Qty: 30 TABLET | Refills: 5 | Status: SHIPPED | OUTPATIENT
Start: 2023-03-03 | End: 2023-08-30

## 2023-03-03 NOTE — PATIENT INSTRUCTIONS
Once your current supply of birlinta is over then you can switch to plavix as this is a much cheaper alternative    Continue ASA + Plavix for a total of 6 months at least    You need corrective lens for left peripheral field deficit from an ophthalmologist before you can Drive    Your Diagnosis is listed in this summary     Follow up in 6 months

## 2023-03-03 NOTE — PROGRESS NOTES
Neuro endovascular Surgery Consultation          Kaden Randle is a 78 y.o. Man who is here for postoperative care. 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 Patient dominant vertebral artery was occluded and hence was having posterior circulation fluctuation. As he was having clinical worsening despite maximal medical management with heparin drip neurology asked me to perform your intervention on him. Past Medical History:     No past medical history on file. Relatively healthy rachell no family history of stroke reports of a stroke in the mother I do not know this is of cardiac or neurological stroke. Past Surgical History:     Past Surgical History:   Procedure Laterality Date    HERNIA REPAIR      IR INTRACRANIAL STENT(S)  1/20/2023    IR INTRACRANIAL STENT(S) 1/20/2023 MD MILAGROS Slaughter [facility]    IR MECHANICAL ART THROMBECTOMY INIT  1/20/2023    IR MECHANICAL ART THROMBECTOMY INIT 1/20/2023 MD MILAGROS Slaughter [facility]    IR TRANSCATHETER INTRAVASCULAR UPPER PERIPH STENT INTRO  1/20/2023    IR TRANSCATHETER INTRAVASCULAR UPPER PERIPH STENT INTRO 1/20/2023 MD MILAGROS Slaughter [facility]       Allergies:     Patient has no known allergies. Medications:     Prior to Admission medications    Medication Sig Start Date End Date Taking?  Authorizing Provider   clopidogrel (PLAVIX) 75 MG tablet Take 1 tablet by mouth daily 3/3/23 8/30/23 Yes Annemarie Darling MD   aspirin 81 MG chewable tablet Take 1 tablet by mouth daily 1/27/23  Yes Sonja Joseph MD   atorvastatin (LIPITOR) 80 MG tablet Take 1 tablet by mouth nightly 1/26/23  Yes Sonja Joseph MD       Social History:     Social History     Tobacco Use    Smoking status: Every Day     Packs/day: 0.75     Years: 40.00     Pack years: 30.00     Types: Cigarettes     Start date: 8/5/1979    Smokeless tobacco: Never Review of Systems:     No chest pain or palpitations  No SOB  No vertigo, lightheadedness or loss of consciousness  No falls, tripping or stumbling  No incontinence of bowels or bladder  No itching or bruising appreciated  No numbness, tingling or focal arm/leg weakness    ROS otherwise negative     Family History:     No family history on file. History of Present Illness:         Like mentioned above this is a 71-year-old pleasant gentleman who has remarkably done well given the circumstances of for stents totally placed in his extracranial and intracranial vessels of the left vertebral artery. 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 He has gone home now and is able to take care of his dog he does use a walker as his gait is still wide-based however he has been working with therapy and gotten very strong. Has some left superior quadrant tropia still is taking aspirin 81 mg and Brilinta 90 mg 2 times a day his Brilinta is almost $300 per month and hence I am switching him to Plavix 75 mg daily. He does have another 20-day refill of Brilinta. I have advised him to continue the Brilinta and once it runs off switch to Plavix. Objective:   BP (!) 144/84 (Site: Right Upper Arm)   Pulse 65   Temp 98.3 °F (36.8 °C)   Ht 6' (1.829 m)   Wt 165 lb (74.8 kg)   SpO2 98%   BMI 22.38 kg/m²      General appearance: alert, appears stated age and cooperative  Head: Normocephalic, without obvious abnormality, atraumatic  Neck: no adenopathy, no carotid bruit and limited ROM  Lungs: clear to auscultation bilaterally  Heart: regular rate and rhythm  Extremities: no cyanosis or edema  Pulses: 2+ and symmetric  Skin: no rashes or lesions     Constitutional: Well developed, well nourished and in no acute distress. Respiratory: Clear to auscultation bilaterally with no use of accessory muscles during respiration. Cardiovascular:  No murmurs auscultated.  Carotid arteries without bruits. Pedal pulses and radial pulses 2+ bilaterally. No edema in all four extremities. Mental Status:    Alert and oriented to person, place, and time. Recent and remote memory: Intact  Attention and concentration: Intact  Speech and language : Intact  Fund of knowledge: Intact  Judgement and Insight: Intact    Cranial Nerves:     II: Visual Fields: Left superior quadrantanopsia  III, IV, VI: Pupils: equally round and reactive to light, 3  to 2  mm bilaterally. EOMs: full with no nystagmus. V: Sensation intact to light touch and pin prick sensation bilaterally  VII: symmetric and strong in the upper and lower face bilaterally  VIII: Hearing intact to finger rub bilaterally   IX,X: Palate elevates symmetrically. XI: head turn (sternocleidomastoid) and shoulder shrug (trapezius) 5/5 bilaterally   XII: Tongue is midline upon protrusion    Motor:   Normal tone and bulk. Normal fine finger movements. No pronator drift. No resting tremors, postural tremors or myoclonus. Upper Extremity Right Left  Lower Extremity Right Left  Deltoid              5 5      Hip Flexion             5 5  Biceps              5 5   Hip Extension             5 5  Triceps                        5 5   Hip Adduction             5 5  Wrist Extension 5 5   Knee Extension 5 5  Wrist Flexion             5 5                Knee Flexion 5 5  Index Finger Flexion 5 5  Ankle Dorsiflexion 5 5  Finger Extension 5 5  Ankle Plantarflexion 5 5  APB                        5 5   Extensor Hallucis Longus 5 5           Sensory:  Intact light touch and pinprick in upper and lower extremities bilaterally. Intact proprioception and vibration sense in upper and lower extremities bilaterally. Coordination:   Bilateral dysmetria is present. Gait/Station:   Patient has a wide-based gait. Does have some nystagmus also which should be mentioned above.   He is able to walk by himself and move the walker however needs heavy reliance on the walker due to his imbalance issues. Laboratory/Radiology:            Assessment & Plan   Cerebellar stroke, acute ([facility] [address]  Vertebral artery dissection (HCC)  S/P angioplasty with stent x4 extracranial and 1 intracranial left vertebral artery occlusion   -     clopidogrel (PLAVIX) 75 MG tablet; Take 1 tablet by mouth daily, Disp-30 tablet, R-5Normal    Return in about 6 months (around 9/3/2023). --Ronaldo Cain MD    CBC:   Lab Results   Component Value Date/Time    WBC 7.0 01/30/2023 05:35 AM    RBC 3.13 01/30/2023 05:35 AM    HGB 9.8 01/30/2023 05:35 AM    HCT 28.5 01/30/2023 05:35 AM    MCV 91.1 01/30/2023 05:35 AM    MCH 31.3 01/30/2023 05:35 AM    MCHC 34.4 01/30/2023 05:35 AM    RDW 13.1 01/30/2023 05:35 AM     01/30/2023 05:35 AM    MPV 8.7 01/30/2023 05:35 AM       I independently reviewed the labs and imaging studies today. 1/20/2023 angiogram mechanical thrombectomy. IMPRESSION 1. 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 Partial from doses of the V1 and V2 segments suspicious for vessel trauma. 2. Successful thrombectomy followed by reocclusion of the left vertebral artery both extracranial and intracranial segments. 3. Complete stent assisted reconstruction of the extracranial left vertebral artery with distal embolic protection. 4. Left posterior cerebral artery and right posterior cerebral artery thrombectomy as a result from artery to artery emboli 5. Successful rescue mechanical intracranial stenting of the V4 segment to prevent reocclusion      Assessment:       Encounter Diagnoses   Name Primary? Cerebellar stroke, acute ([facility] [address] Yes    Vertebral artery dissection (HCC)     S/P angioplasty with stent x4 extracranial and 1 intracranial left vertebral artery occlusion           Plan:       taking aspirin 81 mg and Brilinta 90 mg 2 times a day his Brilinta is almost $300 per month and hence I am switching him to Plavix 75 mg daily.   He does have another 20-day refill of Brilinta. I have advised him to continue the Brilinta and once it runs off switch to Plavix. With regards to driving patient has been clearance to drive I do think that if the ophthalmologist can give him corrective lenses for his left field cut that he is cognitively with to drive. Getting in and out of the car may be an issue however if patient is comfortable doing this at this time I would recommend working with occupational therapy also. From a neurological perspective I think that he is okay if he has corrective lenses. Follow-up in 6 months to reassess the stent. Placed in the left vertebral artery    Korin Cao MD  10:18 AM  3/3/2023    I spent 45 minutes with the patient, with 50% or more counseling them on their diagnosis, diagnostic workup and treatment options.

## 2023-07-21 ENCOUNTER — OFFICE VISIT (OUTPATIENT)
Dept: NEUROLOGY | Age: 68
End: 2023-07-21
Payer: MEDICARE

## 2023-07-21 VITALS
SYSTOLIC BLOOD PRESSURE: 124 MMHG | BODY MASS INDEX: 24.41 KG/M2 | HEART RATE: 64 BPM | OXYGEN SATURATION: 95 % | TEMPERATURE: 97.9 F | WEIGHT: 180 LBS | DIASTOLIC BLOOD PRESSURE: 76 MMHG

## 2023-07-21 DIAGNOSIS — J43.9 PULMONARY EMPHYSEMA, UNSPECIFIED EMPHYSEMA TYPE (HCC): ICD-10-CM

## 2023-07-21 DIAGNOSIS — Z95.820 S/P ANGIOPLASTY WITH STENT: ICD-10-CM

## 2023-07-21 DIAGNOSIS — Z86.73 H/O ARTERIAL ISCHEMIC STROKE: ICD-10-CM

## 2023-07-21 DIAGNOSIS — I77.74 VERTEBRAL ARTERY DISSECTION (HCC): Primary | ICD-10-CM

## 2023-07-21 PROCEDURE — 1123F ACP DISCUSS/DSCN MKR DOCD: CPT | Performed by: PSYCHIATRY & NEUROLOGY

## 2023-07-21 PROCEDURE — 99214 OFFICE O/P EST MOD 30 MIN: CPT | Performed by: PSYCHIATRY & NEUROLOGY

## 2023-07-21 RX ORDER — ATORVASTATIN CALCIUM 80 MG/1
80 TABLET, FILM COATED ORAL NIGHTLY
Qty: 30 TABLET | Refills: 11 | Status: SHIPPED | OUTPATIENT
Start: 2023-07-21 | End: 2024-07-15

## 2023-07-21 RX ORDER — CLOPIDOGREL BISULFATE 75 MG/1
75 TABLET ORAL DAILY
Qty: 30 TABLET | Refills: 11 | Status: SHIPPED | OUTPATIENT
Start: 2023-07-21 | End: 2024-07-15

## 2023-07-21 NOTE — PROGRESS NOTES
Neuro endovascular  Consultation          Jeanette Sunshine is a 80 y.o. Man who is here for post vertebral thrombectomy and vertebral reconstruction with multiple stents extracranial and intracranial       Past Medical History:     No past medical history on file. Relatively healthy rachell no family history of stroke reports of a stroke in the mother I do not know this is of cardiac or neurological stroke. Past Surgical History:     Past Surgical History:   Procedure Laterality Date    HERNIA REPAIR      IR INTRACRANIAL STENT(S)  2023    IR INTRACRANIAL STENT(S) 2023 MD MILAGROS Lilly [facility]    IR MECHANICAL ART THROMBECTOMY INIT  2023    IR MECHANICAL ART THROMBECTOMY INIT 2023 MD MILAGROS Lilly [facility]    IR TRANSCATH PLACE STENT UPPER W PTA INIT ARTERY  2023    IR TRANSCATHETER INTRAVASCULAR UPPER PERIPH STENT INTRO 2023 MD MILAGROS Lilly [facility]       Allergies:     Patient has no known allergies. Medications:     Prior to Admission medications    Medication Sig Start Date End Date Taking?  Authorizing Provider   clopidogrel (PLAVIX) 75 MG tablet Take 1 tablet by mouth daily 3/3/23 8/30/23 Yes Estela Salinas MD   atorvastatin (LIPITOR) 80 MG tablet Take 1 tablet by mouth nightly 23  Yes Debi Thompson MD   aspirin 81 MG chewable tablet Take 1 tablet by mouth daily  Patient not taking: Reported on 2023   Debi Thompson MD       Social History:     Social History     Tobacco Use    Smoking status: Former     Packs/day: 0.75     Years: 40.00     Pack years: 30.00     Types: Cigarettes     Start date: 1979     Quit date: 2023     Years since quittin.5    Smokeless tobacco: Never       Review of Systems:     No chest pain or palpitations  No SOB  No vertigo, lightheadedness or loss of consciousness  No falls, tripping or stumbling  No incontinence of bowels or bladder  No itching or